# Patient Record
Sex: FEMALE | Race: WHITE | NOT HISPANIC OR LATINO
[De-identification: names, ages, dates, MRNs, and addresses within clinical notes are randomized per-mention and may not be internally consistent; named-entity substitution may affect disease eponyms.]

---

## 2017-08-22 ENCOUNTER — APPOINTMENT (OUTPATIENT)
Dept: FAMILY MEDICINE | Facility: CLINIC | Age: 57
End: 2017-08-22
Payer: COMMERCIAL

## 2017-08-22 VITALS
HEART RATE: 88 BPM | OXYGEN SATURATION: 98 % | RESPIRATION RATE: 12 BRPM | TEMPERATURE: 98.3 F | HEIGHT: 66 IN | DIASTOLIC BLOOD PRESSURE: 74 MMHG | WEIGHT: 210 LBS | SYSTOLIC BLOOD PRESSURE: 122 MMHG | BODY MASS INDEX: 33.75 KG/M2

## 2017-08-22 DIAGNOSIS — R09.81 NASAL CONGESTION: ICD-10-CM

## 2017-08-22 DIAGNOSIS — Z87.09 PERSONAL HISTORY OF OTHER DISEASES OF THE RESPIRATORY SYSTEM: ICD-10-CM

## 2017-08-22 DIAGNOSIS — Z87.39 PERSONAL HISTORY OF OTHER DISEASES OF THE MUSCULOSKELETAL SYSTEM AND CONNECTIVE TISSUE: ICD-10-CM

## 2017-08-22 DIAGNOSIS — Z83.3 FAMILY HISTORY OF DIABETES MELLITUS: ICD-10-CM

## 2017-08-22 PROBLEM — Z00.00 ENCOUNTER FOR PREVENTIVE HEALTH EXAMINATION: Status: ACTIVE | Noted: 2017-08-22

## 2017-08-22 PROCEDURE — 99203 OFFICE O/P NEW LOW 30 MIN: CPT

## 2017-08-22 RX ORDER — MELOXICAM 15 MG/1
15 TABLET ORAL
Qty: 30 | Refills: 0 | Status: DISCONTINUED | COMMUNITY
Start: 2017-06-24 | End: 2017-08-22

## 2017-09-06 ENCOUNTER — RX RENEWAL (OUTPATIENT)
Age: 57
End: 2017-09-06

## 2017-09-06 DIAGNOSIS — J30.2 OTHER SEASONAL ALLERGIC RHINITIS: ICD-10-CM

## 2017-09-22 ENCOUNTER — APPOINTMENT (OUTPATIENT)
Dept: FAMILY MEDICINE | Facility: CLINIC | Age: 57
End: 2017-09-22

## 2018-11-13 ENCOUNTER — APPOINTMENT (OUTPATIENT)
Dept: INFECTIOUS DISEASE | Facility: CLINIC | Age: 58
End: 2018-11-13

## 2018-11-21 ENCOUNTER — APPOINTMENT (OUTPATIENT)
Dept: INTERNAL MEDICINE | Facility: CLINIC | Age: 58
End: 2018-11-21
Payer: MEDICAID

## 2018-11-21 ENCOUNTER — LABORATORY RESULT (OUTPATIENT)
Age: 58
End: 2018-11-21

## 2018-11-21 VITALS — DIASTOLIC BLOOD PRESSURE: 98 MMHG | SYSTOLIC BLOOD PRESSURE: 160 MMHG

## 2018-11-21 VITALS — BODY MASS INDEX: 37.77 KG/M2 | HEIGHT: 66 IN | WEIGHT: 235 LBS

## 2018-11-21 DIAGNOSIS — Z82.3 FAMILY HISTORY OF STROKE: ICD-10-CM

## 2018-11-21 PROCEDURE — 99204 OFFICE O/P NEW MOD 45 MIN: CPT | Mod: 25

## 2018-11-21 PROCEDURE — 36415 COLL VENOUS BLD VENIPUNCTURE: CPT

## 2018-11-27 ENCOUNTER — FORM ENCOUNTER (OUTPATIENT)
Age: 58
End: 2018-11-27

## 2018-11-28 ENCOUNTER — OUTPATIENT (OUTPATIENT)
Dept: OUTPATIENT SERVICES | Facility: HOSPITAL | Age: 58
LOS: 1 days | End: 2018-11-28
Payer: MEDICAID

## 2018-11-28 DIAGNOSIS — M86.172 OTHER ACUTE OSTEOMYELITIS, LEFT ANKLE AND FOOT: ICD-10-CM

## 2018-11-28 PROCEDURE — 36569 INSJ PICC 5 YR+ W/O IMAGING: CPT

## 2018-11-28 PROCEDURE — 76937 US GUIDE VASCULAR ACCESS: CPT | Mod: 26

## 2018-11-28 PROCEDURE — 77001 FLUOROGUIDE FOR VEIN DEVICE: CPT | Mod: 26

## 2018-11-28 RX ORDER — CEFEPIME 1 G/1
2000 INJECTION, POWDER, FOR SOLUTION INTRAMUSCULAR; INTRAVENOUS ONCE
Qty: 0 | Refills: 0 | Status: DISCONTINUED | OUTPATIENT
Start: 2018-11-28 | End: 2018-12-13

## 2018-11-28 RX ORDER — CEFEPIME 1 G/1
2000 INJECTION, POWDER, FOR SOLUTION INTRAMUSCULAR; INTRAVENOUS EVERY 8 HOURS
Qty: 0 | Refills: 0 | Status: DISCONTINUED | OUTPATIENT
Start: 2018-11-28 | End: 2018-12-13

## 2018-11-28 RX ORDER — CEFEPIME 1 G/1
INJECTION, POWDER, FOR SOLUTION INTRAMUSCULAR; INTRAVENOUS
Qty: 0 | Refills: 0 | Status: DISCONTINUED | OUTPATIENT
Start: 2018-11-28 | End: 2018-12-13

## 2018-11-29 ENCOUNTER — INPATIENT (INPATIENT)
Facility: HOSPITAL | Age: 58
LOS: 3 days | Discharge: LEFT BEFORE DISCHARGE | DRG: 539 | End: 2018-12-03
Attending: HOSPITALIST | Admitting: HOSPITALIST
Payer: MEDICAID

## 2018-11-29 VITALS — HEIGHT: 65 IN | WEIGHT: 240.08 LBS

## 2018-11-29 LAB
ALBUMIN SERPL ELPH-MCNC: 3.7 G/DL — SIGNIFICANT CHANGE UP (ref 3.3–5.2)
ALBUMIN SERPL ELPH-MCNC: 4.1 G/DL
ALP BLD-CCNC: 96 U/L
ALP SERPL-CCNC: 70 U/L — SIGNIFICANT CHANGE UP (ref 40–120)
ALT FLD-CCNC: 7 U/L — SIGNIFICANT CHANGE UP
ALT SERPL-CCNC: 7 U/L
ANION GAP SERPL CALC-SCNC: 14 MMOL/L
ANION GAP SERPL CALC-SCNC: 16 MMOL/L — SIGNIFICANT CHANGE UP (ref 5–17)
AST SERPL-CCNC: 11 U/L
AST SERPL-CCNC: 11 U/L — SIGNIFICANT CHANGE UP
BASOPHILS # BLD AUTO: 0.03 K/UL
BASOPHILS NFR BLD AUTO: 0.4 %
BILIRUB SERPL-MCNC: 0.2 MG/DL
BILIRUB SERPL-MCNC: 0.6 MG/DL — SIGNIFICANT CHANGE UP (ref 0.4–2)
BUN SERPL-MCNC: 12 MG/DL
BUN SERPL-MCNC: 12 MG/DL — SIGNIFICANT CHANGE UP (ref 8–20)
CALCIUM SERPL-MCNC: 9.1 MG/DL — SIGNIFICANT CHANGE UP (ref 8.6–10.2)
CALCIUM SERPL-MCNC: 9.2 MG/DL
CHLORIDE SERPL-SCNC: 101 MMOL/L
CHLORIDE SERPL-SCNC: 99 MMOL/L — SIGNIFICANT CHANGE UP (ref 98–107)
CO2 SERPL-SCNC: 19 MMOL/L — LOW (ref 22–29)
CO2 SERPL-SCNC: 25 MMOL/L
CREAT SERPL-MCNC: 0.84 MG/DL
CREAT SERPL-MCNC: 0.94 MG/DL — SIGNIFICANT CHANGE UP (ref 0.5–1.3)
CRP SERPL-MCNC: 0.54 MG/DL
EOSINOPHIL # BLD AUTO: 0.45 K/UL
EOSINOPHIL NFR BLD AUTO: 5 % — SIGNIFICANT CHANGE UP (ref 0–5)
EOSINOPHIL NFR BLD AUTO: 5.8 %
ERYTHROCYTE [SEDIMENTATION RATE] IN BLOOD BY WESTERGREN METHOD: 29 MM/HR
GAS PNL BLDV: SIGNIFICANT CHANGE UP
GLUCOSE SERPL-MCNC: 142 MG/DL — HIGH (ref 70–115)
GLUCOSE SERPL-MCNC: 87 MG/DL
HCT VFR BLD CALC: 41.7 % — SIGNIFICANT CHANGE UP (ref 37–47)
HCT VFR BLD CALC: 42.1 %
HGB BLD-MCNC: 14.1 G/DL — SIGNIFICANT CHANGE UP (ref 12–16)
HGB BLD-MCNC: 14.5 G/DL
IMM GRANULOCYTES NFR BLD AUTO: 0.3 %
LYMPHOCYTES # BLD AUTO: 2.39 K/UL
LYMPHOCYTES # BLD AUTO: 3 % — LOW (ref 20–55)
LYMPHOCYTES NFR BLD AUTO: 30.6 %
MAN DIFF?: NORMAL
MCHC RBC-ENTMCNC: 29.5 PG — SIGNIFICANT CHANGE UP (ref 27–31)
MCHC RBC-ENTMCNC: 30.7 PG
MCHC RBC-ENTMCNC: 33.8 G/DL — SIGNIFICANT CHANGE UP (ref 32–36)
MCHC RBC-ENTMCNC: 34.4 GM/DL
MCV RBC AUTO: 87.2 FL — SIGNIFICANT CHANGE UP (ref 81–99)
MCV RBC AUTO: 89 FL
MONOCYTES # BLD AUTO: 0.58 K/UL
MONOCYTES NFR BLD AUTO: 7.4 %
NEUTROPHILS # BLD AUTO: 4.33 K/UL
NEUTROPHILS NFR BLD AUTO: 55.5 %
NEUTROPHILS NFR BLD AUTO: 90 % — HIGH (ref 37–73)
PLATELET # BLD AUTO: 231 K/UL — SIGNIFICANT CHANGE UP (ref 150–400)
PLATELET # BLD AUTO: 255 K/UL
POTASSIUM SERPL-MCNC: 4.4 MMOL/L — SIGNIFICANT CHANGE UP (ref 3.5–5.3)
POTASSIUM SERPL-SCNC: 4.2 MMOL/L
POTASSIUM SERPL-SCNC: 4.4 MMOL/L — SIGNIFICANT CHANGE UP (ref 3.5–5.3)
PROT SERPL-MCNC: 6.5 G/DL
PROT SERPL-MCNC: 6.6 G/DL — SIGNIFICANT CHANGE UP (ref 6.6–8.7)
RBC # BLD: 4.73 M/UL
RBC # BLD: 4.78 M/UL — SIGNIFICANT CHANGE UP (ref 4.4–5.2)
RBC # FLD: 13.2 % — SIGNIFICANT CHANGE UP (ref 11–15.6)
RBC # FLD: 13.3 %
SODIUM SERPL-SCNC: 134 MMOL/L — LOW (ref 135–145)
SODIUM SERPL-SCNC: 140 MMOL/L
WBC # BLD: 10.6 K/UL — SIGNIFICANT CHANGE UP (ref 4.8–10.8)
WBC # FLD AUTO: 10.6 K/UL — SIGNIFICANT CHANGE UP (ref 4.8–10.8)
WBC # FLD AUTO: 7.8 K/UL

## 2018-11-29 PROCEDURE — 93010 ELECTROCARDIOGRAM REPORT: CPT

## 2018-11-29 PROCEDURE — 99223 1ST HOSP IP/OBS HIGH 75: CPT

## 2018-11-29 PROCEDURE — 99285 EMERGENCY DEPT VISIT HI MDM: CPT

## 2018-11-29 PROCEDURE — 73610 X-RAY EXAM OF ANKLE: CPT | Mod: 26,LT

## 2018-11-29 RX ORDER — SODIUM CHLORIDE 9 MG/ML
1000 INJECTION INTRAMUSCULAR; INTRAVENOUS; SUBCUTANEOUS
Qty: 0 | Refills: 0 | Status: COMPLETED | OUTPATIENT
Start: 2018-11-29 | End: 2018-11-30

## 2018-11-29 RX ORDER — VANCOMYCIN HCL 1 G
1250 VIAL (EA) INTRAVENOUS ONCE
Qty: 0 | Refills: 0 | Status: COMPLETED | OUTPATIENT
Start: 2018-11-29 | End: 2018-11-29

## 2018-11-29 RX ORDER — SODIUM CHLORIDE 9 MG/ML
1800 INJECTION INTRAMUSCULAR; INTRAVENOUS; SUBCUTANEOUS ONCE
Qty: 0 | Refills: 0 | Status: COMPLETED | OUTPATIENT
Start: 2018-11-29 | End: 2018-11-29

## 2018-11-29 RX ORDER — ACETAMINOPHEN 500 MG
650 TABLET ORAL ONCE
Qty: 0 | Refills: 0 | Status: COMPLETED | OUTPATIENT
Start: 2018-11-29 | End: 2018-11-29

## 2018-11-29 RX ORDER — FAMOTIDINE 10 MG/ML
20 INJECTION INTRAVENOUS ONCE
Qty: 0 | Refills: 0 | Status: COMPLETED | OUTPATIENT
Start: 2018-11-29 | End: 2018-11-29

## 2018-11-29 RX ORDER — CLOPIDOGREL BISULFATE 75 MG/1
75 TABLET, FILM COATED ORAL DAILY
Qty: 0 | Refills: 0 | Status: DISCONTINUED | OUTPATIENT
Start: 2018-11-29 | End: 2018-12-03

## 2018-11-29 RX ORDER — ATORVASTATIN CALCIUM 80 MG/1
20 TABLET, FILM COATED ORAL AT BEDTIME
Qty: 0 | Refills: 0 | Status: DISCONTINUED | OUTPATIENT
Start: 2018-11-29 | End: 2018-12-03

## 2018-11-29 RX ORDER — DIPHENHYDRAMINE HCL 50 MG
50 CAPSULE ORAL ONCE
Qty: 0 | Refills: 0 | Status: DISCONTINUED | OUTPATIENT
Start: 2018-11-29 | End: 2018-11-29

## 2018-11-29 RX ORDER — DIPHENHYDRAMINE HCL 50 MG
50 CAPSULE ORAL ONCE
Qty: 0 | Refills: 0 | Status: COMPLETED | OUTPATIENT
Start: 2018-11-29 | End: 2018-11-29

## 2018-11-29 RX ORDER — ONDANSETRON 8 MG/1
4 TABLET, FILM COATED ORAL ONCE
Qty: 0 | Refills: 0 | Status: COMPLETED | OUTPATIENT
Start: 2018-11-29 | End: 2018-11-29

## 2018-11-29 RX ADMIN — Medication 650 MILLIGRAM(S): at 20:39

## 2018-11-29 RX ADMIN — FAMOTIDINE 20 MILLIGRAM(S): 10 INJECTION INTRAVENOUS at 18:42

## 2018-11-29 RX ADMIN — SODIUM CHLORIDE 1800 MILLILITER(S): 9 INJECTION INTRAMUSCULAR; INTRAVENOUS; SUBCUTANEOUS at 21:13

## 2018-11-29 RX ADMIN — Medication 125 MILLIGRAM(S): at 18:42

## 2018-11-29 RX ADMIN — Medication 650 MILLIGRAM(S): at 18:41

## 2018-11-29 RX ADMIN — SODIUM CHLORIDE 1800 MILLILITER(S): 9 INJECTION INTRAMUSCULAR; INTRAVENOUS; SUBCUTANEOUS at 18:42

## 2018-11-29 RX ADMIN — Medication 50 MILLIGRAM(S): at 18:41

## 2018-11-29 NOTE — H&P ADULT - PROBLEM SELECTOR PLAN 2
ulcer as source of fever ?  viral ? cxr no obvious infiltrate, pt. has no cough, phlegm. will send flu panel. follow blood , urine cx.  will give one dose of vanco and one dose of levaquin and further antibiotics as per ID. ID consult called. ulcer as source of fever ?  viral ? bacteremia ? cxr no obvious infiltrate, pt. has no cough, phlegm. will send flu panel. follow blood , urine cx.  will give one dose of vanco and one dose of levaquin and further antibiotics as per ID. ID consult called.

## 2018-11-29 NOTE — ED PROVIDER NOTE - ATTENDING CONTRIBUTION TO CARE
[t with drug fever after receiving IV abx    temp 101    sepsis protocol   h/o left ankle infection   to be admitted

## 2018-11-29 NOTE — H&P ADULT - PROBLEM SELECTOR PLAN 1
ulcer , as per history chronic , arterial ulcer ? will request day team to consult vascular surgery team.

## 2018-11-29 NOTE — ED ADULT NURSE NOTE - OBJECTIVE STATEMENT
presents from home sent by visiting nurse for r/o sepsis. present with generalized rash and erythema possibly r/t recent infusion of cefepime via PICC line in right arm placed at [facility] yesterday for left calf wound treatement. denies any n/v/d cp jose. md at bedside will ctm/

## 2018-11-29 NOTE — H&P ADULT - HISTORY OF PRESENT ILLNESS
59 y/o female who got picc line on 11/28/18 and started her cefepime at home recommended by CHANTEL zafar for her LLE wound which is present for a year as per pt. Pt. also stated that she goes [place]. As per pt. after her 2nd dose about 1 hour later she developed generalized rash over her body and face and also developed fever. pt. was BIBA. tmax in .9. pt. was tachy 114. pt. got iv fluids, tylenol, solumedrol, benadryl, pepcid and felt better. At the time of admission pt. is afebrile, HR 70's. pt. reports no breathing difficulty. no cp. no abd. pain. no n/v/d. 57 y/o female who got picc line on 11/28/18 and started her cefepime at home recommended by CHANTEL zafar for her LLE wound which is present for a year as per pt. Pt. also stated that she goes to [place] care [place]. As per pt. after her 2nd dose about 1 hour later she developed generalized rash over her body and face and also developed fever. pt. was BIBA. tmax in .9. pt. was tachy 114. pt. got iv fluids, tylenol, solumedrol, benadryl, pepcid and felt better. At the time of admission pt. is afebrile, HR 70's. pt. reports no breathing difficulty. no cp. no abd. pain. no n/v/d.  pt. reports h/o hyperlipidemia, pvd and got stent in her LLE, has seen dr. Antonio at [facility].

## 2018-11-29 NOTE — ED PROVIDER NOTE - PROGRESS NOTE DETAILS
pt comes in for fever and generalized redness after receiving  Iv abx   h/o left ankle wound being treated at home

## 2018-11-29 NOTE — H&P ADULT - PROBLEM SELECTOR PLAN 3
pt' skin rash might be related to cefpime ? hold cefepime. ID consult for further antibiotics. solumedrol, benadryl and pepcid for now.

## 2018-11-29 NOTE — ED PROVIDER NOTE - MEDICAL DECISION MAKING DETAILS
59yo F pmhx HLD, infection of left ankle wound being treated at home via PICC line, sent in with concern for drug reaction/allergy after visiting nurse noticed diffuse skin rash. Will treat allergic reaction with benadryl, steroids, pepcid. Will also give fluids and tylenol, send labs and xray in setting of fever and active infection.

## 2018-11-29 NOTE — ED ADULT NURSE NOTE - NSIMPLEMENTINTERV_GEN_ALL_ED
Implemented All Universal Safety Interventions:  [place] to call system. Call bell, personal items and telephone within reach. Instruct patient to call for assistance. Room bathroom lighting operational. Non-slip footwear when patient is off stretcher. Physically safe environment: no spills, clutter or unnecessary equipment. Stretcher in lowest position, wheels locked, appropriate side rails in place.

## 2018-11-29 NOTE — H&P ADULT - NSHPLABSRESULTS_GEN_ALL_CORE
cxr reviewed by me, no obvious infiltrate, basal atelectasis ? no pl. effusion, radiologist reading pending.

## 2018-11-29 NOTE — ED ADULT NURSE REASSESSMENT NOTE - NS ED NURSE REASSESS COMMENT FT1
appears improved redness endorsing relief of itching and burning in body wound measured over left shin [id number].

## 2018-11-29 NOTE — H&P ADULT - NSHPPHYSICALEXAM_GEN_ALL_CORE
General: Well developed lying in bed not in distress.   HEENT: AT, NC. PERRL. intact EOM. no throat erythema or exudate.   Neck: supple. no JVD.   Chest: CTA bilaterally  Heart: normal S1,S2. RRR. no heart murmur.  Abdomen: soft. non-tender. non-distended. + BS.   Ext: no C/C/E. no calf tenderness.   above left ankle area and lower lateral part of left lower ext. about  4 x 4 cm somewhat oval shaped ulcer with clean edges with erythematous base, stage 3 without foul smelling discharge noted .   Neuro: AAO x3. no focal weakness. no speech disorder  Skin: fine erythematous rash noted over face, upper chest, abdomen and back and legs. As per staff rash much less than before. General: Well developed lying in bed not in distress.   HEENT: AT, NC. PERRL. intact EOM. no throat erythema or exudate.   Neck: supple. no JVD.   Chest: CTA bilaterally  Heart: normal S1,S2. RRR. no heart murmur. no edema.  Abdomen: soft. non-tender. non-distended. + BS.   Ext: no cyanosis.  no calf tenderness.   above left ankle area and lower lateral part of left lower ext. about  4 x 4 cm somewhat oval shaped ulcer with clean edges with erythematous base, stage 3 without foul smelling discharge noted .   Neuro: AAO x3. no focal weakness. no speech disorder. CN II to XII intact.   Vascular : distal pulses weakly palpable over lower ext.   Skin: fine erythematous rash noted over face, upper chest, abdomen and back and legs. As per staff rash much less than before. RUE picc line appears intact, no surrounding erythema or discharge noted.

## 2018-11-29 NOTE — ED ADULT NURSE NOTE - CHIEF COMPLAINT QUOTE
Patient BIBA, hx of osteomyelitis, as per EMS visiting nurse stated patient has had fevers and "new infection" to left ankle, sent to critical care for code sepsis

## 2018-11-29 NOTE — ED PROVIDER NOTE - CARDIAC RATE
OLU/ [facility])        • Vianey Marcos MD;  Location: [facility] [facility]

## 2018-11-29 NOTE — ED PROVIDER NOTE - OBJECTIVE STATEMENT
57yo F pmhx HLD, PVD, chronic osteo/ulceration of LLE overlying the ankle, recent PICC line placement for cefepime regiment BIBEMS for fever/drug reaction. Pt. explains she has been receiving cefepime since yesterday for her ankle infection, today at 9AM she noticed skin flushing/erythema 3 hours after cefepime dose, visiting nurse also reported fever and weakness. Pt. denies CP SOB n/v/d urinary symptoms.

## 2018-11-30 DIAGNOSIS — R65.10 SYSTEMIC INFLAMMATORY RESPONSE SYNDROME (SIRS) OF NON-INFECTIOUS ORIGIN WITHOUT ACUTE ORGAN DYSFUNCTION: ICD-10-CM

## 2018-11-30 DIAGNOSIS — R50.9 FEVER, UNSPECIFIED: ICD-10-CM

## 2018-11-30 DIAGNOSIS — T78.40XA ALLERGY, UNSPECIFIED, INITIAL ENCOUNTER: ICD-10-CM

## 2018-11-30 DIAGNOSIS — L97.922 NON-PRESSURE CHRONIC ULCER OF UNSPECIFIED PART OF LEFT LOWER LEG WITH FAT LAYER EXPOSED: ICD-10-CM

## 2018-11-30 DIAGNOSIS — E11.9 TYPE 2 DIABETES MELLITUS WITHOUT COMPLICATIONS: ICD-10-CM

## 2018-11-30 LAB
ANION GAP SERPL CALC-SCNC: 9 MMOL/L — SIGNIFICANT CHANGE UP (ref 5–17)
APPEARANCE UR: CLEAR — SIGNIFICANT CHANGE UP
APTT BLD: 28.7 SEC — SIGNIFICANT CHANGE UP (ref 27.5–36.3)
BILIRUB UR-MCNC: NEGATIVE — SIGNIFICANT CHANGE UP
BUN SERPL-MCNC: 12 MG/DL — SIGNIFICANT CHANGE UP (ref 8–20)
BURR CELLS BLD QL SMEAR: PRESENT — SIGNIFICANT CHANGE UP
CALCIUM SERPL-MCNC: 9 MG/DL — SIGNIFICANT CHANGE UP (ref 8.6–10.2)
CHLORIDE SERPL-SCNC: 106 MMOL/L — SIGNIFICANT CHANGE UP (ref 98–107)
CO2 SERPL-SCNC: 24 MMOL/L — SIGNIFICANT CHANGE UP (ref 22–29)
COLOR SPEC: YELLOW — SIGNIFICANT CHANGE UP
CREAT SERPL-MCNC: 0.78 MG/DL — SIGNIFICANT CHANGE UP (ref 0.5–1.3)
DIFF PNL FLD: NEGATIVE — SIGNIFICANT CHANGE UP
GLUCOSE SERPL-MCNC: 185 MG/DL — HIGH (ref 70–115)
GLUCOSE UR QL: NEGATIVE MG/DL — SIGNIFICANT CHANGE UP
HCT VFR BLD CALC: 41.4 % — SIGNIFICANT CHANGE UP (ref 37–47)
HGB BLD-MCNC: 13.6 G/DL — SIGNIFICANT CHANGE UP (ref 12–16)
INR BLD: 1.18 RATIO — HIGH (ref 0.88–1.16)
KETONES UR-MCNC: NEGATIVE — SIGNIFICANT CHANGE UP
LEUKOCYTE ESTERASE UR-ACNC: NEGATIVE — SIGNIFICANT CHANGE UP
LYMPHOCYTES # BLD AUTO: 3 % — LOW (ref 20–55)
MCHC RBC-ENTMCNC: 29.2 PG — SIGNIFICANT CHANGE UP (ref 27–31)
MCHC RBC-ENTMCNC: 32.9 G/DL — SIGNIFICANT CHANGE UP (ref 32–36)
MCV RBC AUTO: 89 FL — SIGNIFICANT CHANGE UP (ref 81–99)
MONOCYTES NFR BLD AUTO: 1 % — LOW (ref 3–10)
NEUTROPHILS NFR BLD AUTO: 96 % — HIGH (ref 37–73)
NITRITE UR-MCNC: NEGATIVE — SIGNIFICANT CHANGE UP
PH UR: 6 — SIGNIFICANT CHANGE UP (ref 5–8)
PLAT MORPH BLD: NORMAL — SIGNIFICANT CHANGE UP
PLATELET # BLD AUTO: 200 K/UL — SIGNIFICANT CHANGE UP (ref 150–400)
POTASSIUM SERPL-MCNC: 4.6 MMOL/L — SIGNIFICANT CHANGE UP (ref 3.5–5.3)
POTASSIUM SERPL-SCNC: 4.6 MMOL/L — SIGNIFICANT CHANGE UP (ref 3.5–5.3)
PROT UR-MCNC: NEGATIVE MG/DL — SIGNIFICANT CHANGE UP
PROTHROM AB SERPL-ACNC: 13.6 SEC — HIGH (ref 10–12.9)
RAPID RVP RESULT: SIGNIFICANT CHANGE UP
RBC # BLD: 4.65 M/UL — SIGNIFICANT CHANGE UP (ref 4.4–5.2)
RBC # FLD: 13.4 % — SIGNIFICANT CHANGE UP (ref 11–15.6)
RBC BLD AUTO: ABNORMAL
SODIUM SERPL-SCNC: 139 MMOL/L — SIGNIFICANT CHANGE UP (ref 135–145)
SP GR SPEC: 1.01 — SIGNIFICANT CHANGE UP (ref 1.01–1.02)
UROBILINOGEN FLD QL: NEGATIVE MG/DL — SIGNIFICANT CHANGE UP
WBC # BLD: 4.8 K/UL — SIGNIFICANT CHANGE UP (ref 4.8–10.8)
WBC # FLD AUTO: 4.8 K/UL — SIGNIFICANT CHANGE UP (ref 4.8–10.8)

## 2018-11-30 PROCEDURE — 99232 SBSQ HOSP IP/OBS MODERATE 35: CPT

## 2018-11-30 PROCEDURE — 71046 X-RAY EXAM CHEST 2 VIEWS: CPT | Mod: 26

## 2018-11-30 PROCEDURE — 99222 1ST HOSP IP/OBS MODERATE 55: CPT

## 2018-11-30 RX ORDER — MEROPENEM 1 G/30ML
1000 INJECTION INTRAVENOUS EVERY 8 HOURS
Qty: 0 | Refills: 0 | Status: DISCONTINUED | OUTPATIENT
Start: 2018-11-30 | End: 2018-12-03

## 2018-11-30 RX ORDER — DIPHENHYDRAMINE HCL 50 MG
25 CAPSULE ORAL EVERY 6 HOURS
Qty: 0 | Refills: 0 | Status: DISCONTINUED | OUTPATIENT
Start: 2018-11-30 | End: 2018-12-03

## 2018-11-30 RX ORDER — MEROPENEM 1 G/30ML
INJECTION INTRAVENOUS
Qty: 0 | Refills: 0 | Status: DISCONTINUED | OUTPATIENT
Start: 2018-11-30 | End: 2018-12-03

## 2018-11-30 RX ORDER — BENZOCAINE AND MENTHOL 5; 1 G/100ML; G/100ML
1 LIQUID ORAL EVERY 4 HOURS
Qty: 0 | Refills: 0 | Status: DISCONTINUED | OUTPATIENT
Start: 2018-11-30 | End: 2018-12-03

## 2018-11-30 RX ORDER — ENOXAPARIN SODIUM 100 MG/ML
40 INJECTION SUBCUTANEOUS DAILY
Qty: 0 | Refills: 0 | Status: DISCONTINUED | OUTPATIENT
Start: 2018-11-30 | End: 2018-12-03

## 2018-11-30 RX ORDER — COLLAGENASE CLOSTRIDIUM HIST. 250 UNIT/G
1 OINTMENT (GRAM) TOPICAL DAILY
Qty: 0 | Refills: 0 | Status: DISCONTINUED | OUTPATIENT
Start: 2018-11-30 | End: 2018-12-03

## 2018-11-30 RX ORDER — CHLORHEXIDINE GLUCONATE 213 G/1000ML
1 SOLUTION TOPICAL DAILY
Qty: 0 | Refills: 0 | Status: DISCONTINUED | OUTPATIENT
Start: 2018-11-30 | End: 2018-12-03

## 2018-11-30 RX ORDER — DIPHENHYDRAMINE HCL 50 MG
25 CAPSULE ORAL EVERY 6 HOURS
Qty: 0 | Refills: 0 | Status: COMPLETED | OUTPATIENT
Start: 2018-11-30 | End: 2018-11-30

## 2018-11-30 RX ORDER — FAMOTIDINE 10 MG/ML
20 INJECTION INTRAVENOUS DAILY
Qty: 0 | Refills: 0 | Status: COMPLETED | OUTPATIENT
Start: 2018-11-30 | End: 2018-12-01

## 2018-11-30 RX ORDER — ACETAMINOPHEN 500 MG
650 TABLET ORAL EVERY 6 HOURS
Qty: 0 | Refills: 0 | Status: DISCONTINUED | OUTPATIENT
Start: 2018-11-30 | End: 2018-12-03

## 2018-11-30 RX ORDER — ONDANSETRON 8 MG/1
4 TABLET, FILM COATED ORAL EVERY 6 HOURS
Qty: 0 | Refills: 0 | Status: DISCONTINUED | OUTPATIENT
Start: 2018-11-30 | End: 2018-12-03

## 2018-11-30 RX ORDER — SODIUM CHLORIDE 9 MG/ML
1000 INJECTION INTRAMUSCULAR; INTRAVENOUS; SUBCUTANEOUS
Qty: 0 | Refills: 0 | Status: DISCONTINUED | OUTPATIENT
Start: 2018-11-30 | End: 2018-12-03

## 2018-11-30 RX ORDER — OXYCODONE HYDROCHLORIDE 5 MG/1
5 TABLET ORAL ONCE
Qty: 0 | Refills: 0 | Status: DISCONTINUED | OUTPATIENT
Start: 2018-11-30 | End: 2018-11-30

## 2018-11-30 RX ORDER — MEROPENEM 1 G/30ML
1000 INJECTION INTRAVENOUS ONCE
Qty: 0 | Refills: 0 | Status: COMPLETED | OUTPATIENT
Start: 2018-11-30 | End: 2018-11-30

## 2018-11-30 RX ADMIN — CLOPIDOGREL BISULFATE 75 MILLIGRAM(S): 75 TABLET, FILM COATED ORAL at 12:44

## 2018-11-30 RX ADMIN — Medication 25 MILLIGRAM(S): at 05:06

## 2018-11-30 RX ADMIN — Medication 40 MILLIGRAM(S): at 20:57

## 2018-11-30 RX ADMIN — Medication 25 MILLIGRAM(S): at 17:24

## 2018-11-30 RX ADMIN — Medication 40 MILLIGRAM(S): at 12:44

## 2018-11-30 RX ADMIN — Medication 25 MILLIGRAM(S): at 12:44

## 2018-11-30 RX ADMIN — MEROPENEM 100 MILLIGRAM(S): 1 INJECTION INTRAVENOUS at 15:39

## 2018-11-30 RX ADMIN — ONDANSETRON 4 MILLIGRAM(S): 8 TABLET, FILM COATED ORAL at 06:00

## 2018-11-30 RX ADMIN — Medication 650 MILLIGRAM(S): at 12:43

## 2018-11-30 RX ADMIN — Medication 40 MILLIGRAM(S): at 01:33

## 2018-11-30 RX ADMIN — ATORVASTATIN CALCIUM 20 MILLIGRAM(S): 80 TABLET, FILM COATED ORAL at 20:59

## 2018-11-30 RX ADMIN — SODIUM CHLORIDE 100 MILLILITER(S): 9 INJECTION INTRAMUSCULAR; INTRAVENOUS; SUBCUTANEOUS at 12:50

## 2018-11-30 RX ADMIN — Medication 650 MILLIGRAM(S): at 13:49

## 2018-11-30 RX ADMIN — SODIUM CHLORIDE 150 MILLILITER(S): 9 INJECTION INTRAMUSCULAR; INTRAVENOUS; SUBCUTANEOUS at 01:15

## 2018-11-30 RX ADMIN — SODIUM CHLORIDE 100 MILLILITER(S): 9 INJECTION INTRAMUSCULAR; INTRAVENOUS; SUBCUTANEOUS at 20:59

## 2018-11-30 RX ADMIN — Medication 1 APPLICATION(S): at 12:45

## 2018-11-30 RX ADMIN — MEROPENEM 100 MILLIGRAM(S): 1 INJECTION INTRAVENOUS at 20:56

## 2018-11-30 RX ADMIN — FAMOTIDINE 20 MILLIGRAM(S): 10 INJECTION INTRAVENOUS at 12:44

## 2018-11-30 RX ADMIN — OXYCODONE HYDROCHLORIDE 5 MILLIGRAM(S): 5 TABLET ORAL at 18:06

## 2018-11-30 RX ADMIN — OXYCODONE HYDROCHLORIDE 5 MILLIGRAM(S): 5 TABLET ORAL at 18:30

## 2018-11-30 RX ADMIN — Medication 250 MILLIGRAM(S): at 01:35

## 2018-11-30 NOTE — CONSULT NOTE ADULT - ASSESSMENT
This 58 y.o. F with PAD, HTN, here for fevers, had recently been started on Cefepime for left leg non healing ulcer and osteomyelitis.     - unclear if this is a drug reaction.   - RLL infiltrate on CXR may represent pneumonia    - defer any more doses of cefepime    - Start Meropenem 1 gram IV Q8H first dose stat; will treat pseudomonas and possible PNA  - will monitor for reaction  - PRN solumedrol  - PRN benadryl    - follow up all outstanding cultures  - trend temperature and WBC curve  - repeat cultures from blood and all sources if febrile.

## 2018-11-30 NOTE — PROGRESS NOTE ADULT - SUBJECTIVE AND OBJECTIVE BOX
KARLA TOTH  ----------------------------------------  The patient was seen and evaluated for rash.  The patient is in no acute distress.  Denied any chest pain, palpitations, dyspnea, or abdominal pain.  Reports persistent rash.    Vital Signs Last 24 Hrs  T(C): 37.2 (2018 09:13), Max: 38.8 (2018 17:30)  T(F): 99 (2018 09:13), Max: 101.9 (2018 17:30)  HR: 132 (2018 08:55) (74 - 132)  BP: 158/87 (2018 08:55) (107/56 - 176/85)  BP(mean): --  RR: 18 (2018 08:55) (17 - 18)  SpO2: 93% (2018 08:55) (93% - 97%)    PHYSICAL EXAMINATION:  ----------------------------------------  General appearance: No acute distress, Awake, Alert  HEENT: Normocephalic, Atraumatic, Conjunctiva clear, EOMI  Neck: Supple, No JVD, No tenderness  Lungs: Clear to auscultation, Breath sound equal bilaterally, No wheezes, No rales  Cardiovascular: S1S2, Regular rhythm  Abdomen: Soft, Nontender, Nondistended, No guarding/rebound, Positive bowel sounds  Extremities: No clubbing, No cyanosis, No edema, No calf tenderness, Left leg dressing clean and intact  Neuro: Strength equal bilaterally, No tremors  Psychiatric: Appropriate mood, Normal affect  Derm: Erythematous rash    LABORATORY STUDIES:  ----------------------------------------             13.6   4.8   )-----------( 200      ( 2018 04:40 )             41.4     11-30    139  |  106  |  12.0  ----------------------------<  185<H>  4.6   |  24.0  |  0.78    Ca    9.0      2018 04:40    TPro  6.6  /  Alb  3.7  /  TBili  0.6  /  DBili  x   /  AST  11  /  ALT  7   /  AlkPhos  70  11-29    LIVER FUNCTIONS - ( 2018 17:54 )  Alb: 3.7 g/dL / Pro: 6.6 g/dL / ALK PHOS: 70 U/L / ALT: 7 U/L / AST: 11 U/L / GGT: x           PT/INR - ( 2018 04:40 )   PT: 13.6 sec;   INR: 1.18 ratio    PTT - ( 2018 04:40 )  PTT:28.7 sec    Urinalysis Basic - ( 2018 02:36 )  Color: Yellow / Appearance: Clear / S.010 / pH: x  Gluc: x / Ketone: Negative  / Bili: Negative / Urobili: Negative mg/dL   Blood: x / Protein: Negative mg/dL / Nitrite: Negative   Leuk Esterase: Negative / RBC: x / WBC x   Sq Epi: x / Non Sq Epi: x / Bacteria: x    MEDICATIONS  (STANDING):  atorvastatin 20 milliGRAM(s) Oral at bedtime  clopidogrel Tablet 75 milliGRAM(s) Oral daily  collagenase Ointment 1 Application(s) Topical daily  diphenhydrAMINE 25 milliGRAM(s) Oral every 6 hours  enoxaparin Injectable 40 milliGRAM(s) SubCutaneous daily  famotidine    Tablet 20 milliGRAM(s) Oral daily  methylPREDNISolone sodium succinate Injectable 40 milliGRAM(s) IV Push every 8 hours    MEDICATIONS  (PRN):  benzocaine 15 mG/menthol 3.6 mG Lozenge 1 Lozenge Oral every 4 hours PRN Sore Throat  ondansetron Injectable 4 milliGRAM(s) IV Push every 6 hours PRN Nausea and/or Vomiting      ASSESSMENT / PLAN:  ----------------------------------------  Rash / Drug reaction - Infectious Disease consultation pending. On methylprednisolone and diphenhydramine. No respiratory compromise. Outpatient notes report that the patient was being treated for osteomyelitis and had recent stent placement for peripheral artery disease.    SIRS - Monitoring fever trend. Blood culture results to be followed.    Leg ulcer - On collagenase as per home wound care regimen. KARLA TOTH  ----------------------------------------  The patient was seen and evaluated for rash.  The patient is in no acute distress.  Denied any chest pain, palpitations, dyspnea, or abdominal pain.  Reports persistent rash.    HPI:  58F presented with a generalized rash and fever while on home intravenous antibiotics. The patient was noted to have a chronic left lower extremity wound for the past year. On presentation, Temp(101.9), WBC(10.6). Methylprednisolone and diphenhydramine were initiated for possible drug reaction.     Vital Signs Last 24 Hrs  T(C): 37.2 (2018 09:13), Max: 38.8 (2018 17:30)  T(F): 99 (2018 09:13), Max: 101.9 (2018 17:30)  HR: 132 (2018 08:55) (74 - 132)  BP: 158/87 (2018 08:55) (107/56 - 176/85)  BP(mean): --  RR: 18 (2018 08:55) (17 - 18)  SpO2: 93% (2018 08:55) (93% - 97%)    PHYSICAL EXAMINATION:  ----------------------------------------  General appearance: No acute distress, Awake, Alert  HEENT: Normocephalic, Atraumatic, Conjunctiva clear, EOMI  Neck: Supple, No JVD, No tenderness  Lungs: Clear to auscultation, Breath sound equal bilaterally, No wheezes, No rales  Cardiovascular: S1S2, Regular rhythm  Abdomen: Soft, Nontender, Nondistended, No guarding/rebound, Positive bowel sounds  Extremities: No clubbing, No cyanosis, No edema, No calf tenderness, Left leg dressing clean and intact  Neuro: Strength equal bilaterally, No tremors  Psychiatric: Appropriate mood, Normal affect  Derm: Erythematous rash    LABORATORY STUDIES:  ----------------------------------------             13.6   4.8   )-----------( 200      ( 2018 04:40 )             41.4         139  |  106  |  12.0  ----------------------------<  185<H>  4.6   |  24.0  |  0.78    Ca    9.0      2018 04:40    TPro  6.6  /  Alb  3.7  /  TBili  0.6  /  DBili  x   /  AST  11  /  ALT  7   /  AlkPhos  70      LIVER FUNCTIONS - ( 2018 17:54 )  Alb: 3.7 g/dL / Pro: 6.6 g/dL / ALK PHOS: 70 U/L / ALT: 7 U/L / AST: 11 U/L / GGT: x           PT/INR - ( 2018 04:40 )   PT: 13.6 sec;   INR: 1.18 ratio    PTT - ( 2018 04:40 )  PTT:28.7 sec    Urinalysis Basic - ( 2018 02:36 )  Color: Yellow / Appearance: Clear / S.010 / pH: x  Gluc: x / Ketone: Negative  / Bili: Negative / Urobili: Negative mg/dL   Blood: x / Protein: Negative mg/dL / Nitrite: Negative   Leuk Esterase: Negative / RBC: x / WBC x   Sq Epi: x / Non Sq Epi: x / Bacteria: x    MEDICATIONS  (STANDING):  atorvastatin 20 milliGRAM(s) Oral at bedtime  clopidogrel Tablet 75 milliGRAM(s) Oral daily  collagenase Ointment 1 Application(s) Topical daily  diphenhydrAMINE 25 milliGRAM(s) Oral every 6 hours  enoxaparin Injectable 40 milliGRAM(s) SubCutaneous daily  famotidine    Tablet 20 milliGRAM(s) Oral daily  methylPREDNISolone sodium succinate Injectable 40 milliGRAM(s) IV Push every 8 hours    MEDICATIONS  (PRN):  benzocaine 15 mG/menthol 3.6 mG Lozenge 1 Lozenge Oral every 4 hours PRN Sore Throat  ondansetron Injectable 4 milliGRAM(s) IV Push every 6 hours PRN Nausea and/or Vomiting      ASSESSMENT / PLAN:  ----------------------------------------  Rash / Drug reaction - Infectious Disease consultation pending. On methylprednisolone and diphenhydramine. No respiratory compromise. Outpatient notes report that the patient was being treated for osteomyelitis and had recent stent placement for peripheral artery disease.    SIRS - Monitoring fever trend. Blood culture results to be followed.    Leg ulcer - On collagenase as per home wound care regimen.    Hyponatremia - Minimal. Repeat laboratory studies ordered to monitor.

## 2018-12-01 LAB
ANION GAP SERPL CALC-SCNC: 11 MMOL/L — SIGNIFICANT CHANGE UP (ref 5–17)
BUN SERPL-MCNC: 16 MG/DL — SIGNIFICANT CHANGE UP (ref 8–20)
CALCIUM SERPL-MCNC: 8.9 MG/DL — SIGNIFICANT CHANGE UP (ref 8.6–10.2)
CHLORIDE SERPL-SCNC: 110 MMOL/L — HIGH (ref 98–107)
CO2 SERPL-SCNC: 24 MMOL/L — SIGNIFICANT CHANGE UP (ref 22–29)
CREAT SERPL-MCNC: 0.59 MG/DL — SIGNIFICANT CHANGE UP (ref 0.5–1.3)
CULTURE RESULTS: SIGNIFICANT CHANGE UP
GLUCOSE SERPL-MCNC: 118 MG/DL — HIGH (ref 70–115)
HCT VFR BLD CALC: 33.6 % — LOW (ref 37–47)
HGB BLD-MCNC: 11.1 G/DL — LOW (ref 12–16)
MCHC RBC-ENTMCNC: 28.8 PG — SIGNIFICANT CHANGE UP (ref 27–31)
MCHC RBC-ENTMCNC: 33 G/DL — SIGNIFICANT CHANGE UP (ref 32–36)
MCV RBC AUTO: 87.3 FL — SIGNIFICANT CHANGE UP (ref 81–99)
PLATELET # BLD AUTO: 180 K/UL — SIGNIFICANT CHANGE UP (ref 150–400)
POTASSIUM SERPL-MCNC: 4.2 MMOL/L — SIGNIFICANT CHANGE UP (ref 3.5–5.3)
POTASSIUM SERPL-SCNC: 4.2 MMOL/L — SIGNIFICANT CHANGE UP (ref 3.5–5.3)
RBC # BLD: 3.85 M/UL — LOW (ref 4.4–5.2)
RBC # FLD: 13.5 % — SIGNIFICANT CHANGE UP (ref 11–15.6)
SODIUM SERPL-SCNC: 145 MMOL/L — SIGNIFICANT CHANGE UP (ref 135–145)
SPECIMEN SOURCE: SIGNIFICANT CHANGE UP
WBC # BLD: 11 K/UL — HIGH (ref 4.8–10.8)
WBC # FLD AUTO: 11 K/UL — HIGH (ref 4.8–10.8)

## 2018-12-01 PROCEDURE — 99232 SBSQ HOSP IP/OBS MODERATE 35: CPT

## 2018-12-01 PROCEDURE — 99233 SBSQ HOSP IP/OBS HIGH 50: CPT

## 2018-12-01 RX ORDER — OXYCODONE AND ACETAMINOPHEN 5; 325 MG/1; MG/1
1 TABLET ORAL EVERY 6 HOURS
Qty: 0 | Refills: 0 | Status: DISCONTINUED | OUTPATIENT
Start: 2018-12-01 | End: 2018-12-03

## 2018-12-01 RX ORDER — OXYCODONE HYDROCHLORIDE 5 MG/1
5 TABLET ORAL ONCE
Qty: 0 | Refills: 0 | Status: DISCONTINUED | OUTPATIENT
Start: 2018-12-01 | End: 2018-12-01

## 2018-12-01 RX ORDER — HYDROMORPHONE HYDROCHLORIDE 2 MG/ML
0.5 INJECTION INTRAMUSCULAR; INTRAVENOUS; SUBCUTANEOUS ONCE
Qty: 0 | Refills: 0 | Status: DISCONTINUED | OUTPATIENT
Start: 2018-12-01 | End: 2018-12-01

## 2018-12-01 RX ORDER — OXYCODONE AND ACETAMINOPHEN 5; 325 MG/1; MG/1
2 TABLET ORAL EVERY 6 HOURS
Qty: 0 | Refills: 0 | Status: DISCONTINUED | OUTPATIENT
Start: 2018-12-01 | End: 2018-12-03

## 2018-12-01 RX ADMIN — OXYCODONE HYDROCHLORIDE 5 MILLIGRAM(S): 5 TABLET ORAL at 06:40

## 2018-12-01 RX ADMIN — OXYCODONE AND ACETAMINOPHEN 2 TABLET(S): 5; 325 TABLET ORAL at 22:32

## 2018-12-01 RX ADMIN — MEROPENEM 100 MILLIGRAM(S): 1 INJECTION INTRAVENOUS at 13:13

## 2018-12-01 RX ADMIN — ENOXAPARIN SODIUM 40 MILLIGRAM(S): 100 INJECTION SUBCUTANEOUS at 11:43

## 2018-12-01 RX ADMIN — OXYCODONE AND ACETAMINOPHEN 2 TABLET(S): 5; 325 TABLET ORAL at 21:32

## 2018-12-01 RX ADMIN — OXYCODONE AND ACETAMINOPHEN 2 TABLET(S): 5; 325 TABLET ORAL at 12:38

## 2018-12-01 RX ADMIN — CHLORHEXIDINE GLUCONATE 1 APPLICATION(S): 213 SOLUTION TOPICAL at 11:44

## 2018-12-01 RX ADMIN — MEROPENEM 100 MILLIGRAM(S): 1 INJECTION INTRAVENOUS at 05:31

## 2018-12-01 RX ADMIN — HYDROMORPHONE HYDROCHLORIDE 0.5 MILLIGRAM(S): 2 INJECTION INTRAMUSCULAR; INTRAVENOUS; SUBCUTANEOUS at 09:20

## 2018-12-01 RX ADMIN — MEROPENEM 100 MILLIGRAM(S): 1 INJECTION INTRAVENOUS at 21:33

## 2018-12-01 RX ADMIN — Medication 25 MILLIGRAM(S): at 06:30

## 2018-12-01 RX ADMIN — HYDROMORPHONE HYDROCHLORIDE 0.5 MILLIGRAM(S): 2 INJECTION INTRAMUSCULAR; INTRAVENOUS; SUBCUTANEOUS at 08:59

## 2018-12-01 RX ADMIN — SODIUM CHLORIDE 100 MILLILITER(S): 9 INJECTION INTRAMUSCULAR; INTRAVENOUS; SUBCUTANEOUS at 08:59

## 2018-12-01 RX ADMIN — Medication 1 APPLICATION(S): at 11:43

## 2018-12-01 RX ADMIN — OXYCODONE AND ACETAMINOPHEN 2 TABLET(S): 5; 325 TABLET ORAL at 13:15

## 2018-12-01 RX ADMIN — ATORVASTATIN CALCIUM 20 MILLIGRAM(S): 80 TABLET, FILM COATED ORAL at 21:33

## 2018-12-01 RX ADMIN — OXYCODONE HYDROCHLORIDE 5 MILLIGRAM(S): 5 TABLET ORAL at 06:54

## 2018-12-01 RX ADMIN — CLOPIDOGREL BISULFATE 75 MILLIGRAM(S): 75 TABLET, FILM COATED ORAL at 11:43

## 2018-12-01 RX ADMIN — FAMOTIDINE 20 MILLIGRAM(S): 10 INJECTION INTRAVENOUS at 11:43

## 2018-12-01 NOTE — PROGRESS NOTE ADULT - ASSESSMENT
ASSESSMENT / PLAN:  ----------------------------------------  skin rash, possible Rash / Drug reaction - improving. Infectious Disease consultation noted. On methylprednisolone and diphenhydramine PRN. will start PO prednisone. No respiratory compromise. Outpatient notes report that the patient was being treated for osteomyelitis and had recent stent placement for peripheral artery disease.     SIRS - possibly due to PNA/wound infection. on IV meropenem. f/u all cx. trend temp adn WBC,  repeat pncx if febrile    Leg ulcer/Pseumonous on wound cx -  On collagenase as per home wound care regimen. wound care and ID on board    Hyponatremia - improved    DVT-P: lovenox ASSESSMENT / PLAN:  ----------------------------------------  skin rash, possible Rash / Drug reaction - improving. Infectious Disease consultation noted. On methylprednisolone and diphenhydramine PRN. No respiratory compromise. Outpatient notes report that the patient was being treated for osteomyelitis and had recent stent placement for peripheral artery disease.     SIRS - possibly due to PNA/wound infection. on IV meropenem. f/u all cx. trend temp adn WBC,  repeat pncx if febrile    Leg ulcer/Pseumonous on wound cx -  On collagenase as per home wound care regimen. wound care and ID on board. pain control    Hyponatremia - improved    DVT-P: lovenox ASSESSMENT / PLAN:  ----------------------------------------  skin rash, possible Rash / Drug reaction - improving. Infectious Disease consultation noted. On methylprednisolone and diphenhydramine PRN. No respiratory compromise. Outpatient notes report that the patient was being treated for osteomyelitis and had recent stent placement for peripheral artery disease.     SIRS - possibly due to PNA/wound infection. on IV meropenem. f/u all cx. trend temp adn WBC,  repeat pncx if febrile    Leg ulcer/Pseumonous on wound cx -  On collagenase as per home wound care regimen. wound care and ID on board. pain control    Hyponatremia - improved    anemia: hb dropped 13-11. no ssx of bleeding. mointor    DVT-P: lovenox

## 2018-12-01 NOTE — PROGRESS NOTE ADULT - SUBJECTIVE AND OBJECTIVE BOX
KARLA TOTH  ----------------------------------------  c/c: rash. left leg pain, wound healing slowly,PNA on IV Abx      seen t bedside. The patient is in no acute distress. rash getting better, still have mild to moderate pain over the wound controlled with pain meds. no other complaints reported. Denied any chest pain, palpitations, dyspnea, or abdominal pain. no n/v/d/c. no dysuria. no fever/chills      HPI:  This is a 58y  Female with HLD, PAD, who had been seen by Dr. Mcdonnell on 18, for evaluation of a left lateral lower leg ulcer present for more than a year.  pEr notes from the office, patient had ulcer after sustaining a bug bite, leading to a non healing ulcer.  The wound had decrease in size, but never completely healed.  Outpatient wound culture with Pseudomonas, not available for review at time of examination.  The patient also had LLE bypass for poor circulation. Patient had PICC line placed on or about Wednesday, 18 and had 1 dose of Cefepime on the afternoon, and evening at home.  She had a 3rd dose of Cefepime yesterday AM and had fevers, chills and body rash.  She was sent her for evaluation. She was found febrile to 102.8. Workup here is significant for patchy RLL infiltrate on CXR.  Urine and blood culture are sent and in process. Methylprednisolone and diphenhydramine were initiated for possible drug reaction. seen by ID, suggested unclear about drug reaction. CXr shows PNA. currently on IV meropenem without any adverse effect.       Vital Signs Last 24 Hrs  T(C): 37.2 (01 Dec 2018 08:05), Max: 37.2 (01 Dec 2018 08:05)  T(F): 98.9 (01 Dec 2018 08:05), Max: 98.9 (01 Dec 2018 08:05)  HR: 75 (01 Dec 2018 08:05) (67 - 90)  BP: 124/65 (01 Dec 2018 08:05) (85/54 - 124/65)  BP(mean): --  RR: 19 (01 Dec 2018 08:05) (18 - 19)  SpO2: 93% (01 Dec 2018 00:31) (93% - 95%)    PHYSICAL EXAMINATION:  ----------------------------------------  GENERAL: Not in distress. Alert . walking comfortably  HEENT:  Normocephalic and atraumatic. PEARLA,EOMI  NECK: Supple.  No JVD.    CARDIOVASCULAR: RRR S1, S2. No murmur/rubs/gallop  LUNGS: BLAE+, no rales, no wheezing, no rhonchi.    ABDOMEN: ND. Soft,  NT, no guarding / rebound / rigidity. BS normoactive. No CVA tenderness.    BACK: No spine tenderness.  EXTREMITIES: no cyanosis, no clubbing, no edema.   SKIN: erythematous rash over chest/abd and legs. left leg woend dressing is clean and dry  NEUROLOGIC: AAO*3.strength is symmetric, sensation intact, speech fluent.    PSYCHIATRIC: Calm.  No agitation.      LABORATORY STUDIES:  ----------------------------------------                        11.1   11.0  )-----------( 180      ( 01 Dec 2018 08:54 )             33.6       12-    145  |  110<H>  |  16.0  ----------------------------<  118<H>  4.2   |  24.0  |  0.59    Ca    8.9      01 Dec 2018 08:54    TPro  6.6  /  Alb  3.7  /  TBili  0.6  /  DBili  x   /  AST  11  /  ALT  7   /  AlkPhos  70  11-29    Urinalysis Basic - ( 2018 02:36 )  Color: Yellow / Appearance: Clear / S.010 / pH: x  Gluc: x / Ketone: Negative  / Bili: Negative / Urobili: Negative mg/dL   Blood: x / Protein: Negative mg/dL / Nitrite: Negative   Leuk Esterase: Negative / RBC: x / WBC x   Sq Epi: x / Non Sq Epi: x / Bacteria: x    Culture - Urine (18 @ 02:35)    Specimen Source: .Urine    Culture Results:   <10,000 CFU/ml Gram Positive Rods        MEDICATIONS  (STANDING):  atorvastatin 20 milliGRAM(s) Oral at bedtime  chlorhexidine 2% Cloths 1 Application(s) Topical daily  clopidogrel Tablet 75 milliGRAM(s) Oral daily  collagenase Ointment 1 Application(s) Topical daily  enoxaparin Injectable 40 milliGRAM(s) SubCutaneous daily  meropenem  IVPB 1000 milliGRAM(s) IV Intermittent every 8 hours  meropenem  IVPB      sodium chloride 0.9%. 1000 milliLiter(s) (100 mL/Hr) IV Continuous <Continuous>    MEDICATIONS  (PRN):  acetaminophen   Tablet .. 650 milliGRAM(s) Oral every 6 hours PRN Temp greater or equal to 38C (100.4F), Mild Pain (1 - 3)  benzocaine 15 mG/menthol 3.6 mG Lozenge 1 Lozenge Oral every 4 hours PRN Sore Throat  diphenhydrAMINE   Injectable 25 milliGRAM(s) IV Push every 6 hours PRN Rash and/or Itching  methylPREDNISolone sodium succinate Injectable 125 milliGRAM(s) IV Push every 8 hours PRN rash or allergies  ondansetron Injectable 4 milliGRAM(s) IV Push every 6 hours PRN Nausea and/or Vomiting  oxyCODONE    5 mG/acetaminophen 325 mG 1 Tablet(s) Oral every 6 hours PRN Moderate Pain (4 - 6)  oxyCODONE    5 mG/acetaminophen 325 mG 2 Tablet(s) Oral every 6 hours PRN Severe Pain (7 - 10)

## 2018-12-01 NOTE — PROGRESS NOTE ADULT - SUBJECTIVE AND OBJECTIVE BOX
[facility]  INFECTIOUS DISEASES AND INTERNAL MEDICINE at [place]  =======================================================  Diaz Campos MD  Diplomates American Board of Internal Medicine and Infectious Diseases  =======================================================    KARLA TOTH [id number]    Follow up: osteo  RASH      Allergies:  No Known Allergies      Medications:  acetaminophen   Tablet .. 650 milliGRAM(s) Oral every 6 hours PRN  atorvastatin 20 milliGRAM(s) Oral at bedtime  benzocaine 15 mG/menthol 3.6 mG Lozenge 1 Lozenge Oral every 4 hours PRN  chlorhexidine 2% Cloths 1 Application(s) Topical daily  clopidogrel Tablet 75 milliGRAM(s) Oral daily  collagenase Ointment 1 Application(s) Topical daily  diphenhydrAMINE   Injectable 25 milliGRAM(s) IV Push every 6 hours PRN  enoxaparin Injectable 40 milliGRAM(s) SubCutaneous daily  meropenem  IVPB 1000 milliGRAM(s) IV Intermittent every 8 hours  meropenem  IVPB      methylPREDNISolone sodium succinate Injectable 125 milliGRAM(s) IV Push every 8 hours PRN  ondansetron Injectable 4 milliGRAM(s) IV Push every 6 hours PRN  oxyCODONE    5 mG/acetaminophen 325 mG 1 Tablet(s) Oral every 6 hours PRN  oxyCODONE    5 mG/acetaminophen 325 mG 2 Tablet(s) Oral every 6 hours PRN  sodium chloride 0.9%. 1000 milliLiter(s) IV Continuous <Continuous>    SOCIAL       FAMILY   FAMILY HISTORY:  FH: CVA (cerebrovascular accident) (Mother)  FH: diabetes mellitus (Father)    REVIEW OF SYSTEMS:  CONSTITUTIONAL:  No Fever or chills  HEENT:   No diplopia or blurred vision.  No earache, sore throat or runny nose.  CARDIOVASCULAR:  No pressure, squeezing, strangling, tightness, heaviness or aching about the chest, neck, axilla or epigastrium.  RESPIRATORY:  No cough, shortness of breath, PND or orthopnea.  GASTROINTESTINAL:  No nausea, vomiting or diarrhea.  GENITOURINARY:  No dysuria, frequency or urgency. No Blood in urine  MUSCULOSKELETAL:   AS PER HPI  SKIN: AS PER HPI s.  NEUROLOGIC:  No paresthesias, fasciculations, seizures or weakness.  PSYCHIATRIC:  No disorder of thought or mood.  ENDOCRINE:  No heat or cold intolerance, polyuria or polydipsia.  HEMATOLOGICAL:  No easy bruising or bleeding.            Physical Exam:  ICU Vital Signs Last 24 Hrs  T(C): 37.2 (01 Dec 2018 08:05), Max: 37.2 (01 Dec 2018 08:05)  T(F): 98.9 (01 Dec 2018 08:05), Max: 98.9 (01 Dec 2018 08:05)  HR: 75 (01 Dec 2018 08:05) (67 - 90)  BP: 124/65 (01 Dec 2018 08:05) (85/54 - 124/65)  BP(mean): --  ABP: --  ABP(mean): --  RR: 19 (01 Dec 2018 08:05) (18 - 19)  SpO2: 93% (01 Dec 2018 00:31) (93% - 95%)    GEN: NAD, pleasant  HEENT: normocephalic and atraumatic. EOMI. SHANE.    NECK: Supple. No carotid bruits.  No lymphadenopathy or thyromegaly.  LUNGS: Clear to auscultation.  HEART: Regular rate and rhythm without murmur.  ABDOMEN: Soft, nontender, and nondistended.  Positive bowel sounds.    : No CVA tenderness  EXTREMITIES: Without any cyanosis, clubbing, rash, lesions or edema.  MSK: no joint swelling  NEUROLOGIC: Cranial nerves II through XII are grossly intact.  PSYCHIATRIC: Appropriate affect .  SKIN:  DIFFUSE MACULOPAPULAR  RASH TRUNK AND EXTREMTIES        Labs:      145  |  110<H>  |  16.0  ----------------------------<  118<H>  4.2   |  24.0  |  0.59    Ca    8.9      01 Dec 2018 08:54    TPro  6.6  /  Alb  3.7  /  TBili  0.6  /  DBili  x   /  AST  11  /  ALT  7   /  AlkPhos  70                            11.1   11.0  )-----------( 180      ( 01 Dec 2018 08:54 )             33.6       PT/INR - ( 2018 04:40 )   PT: 13.6 sec;   INR: 1.18 ratio         PTT - ( 2018 04:40 )  PTT:28.7 sec  Urinalysis Basic - ( 2018 02:36 )    Color: Yellow / Appearance: Clear / S.010 / pH: x  Gluc: x / Ketone: Negative  / Bili: Negative / Urobili: Negative mg/dL   Blood: x / Protein: Negative mg/dL / Nitrite: Negative   Leuk Esterase: Negative / RBC: x / WBC x   Sq Epi: x / Non Sq Epi: x / Bacteria: x      LIVER FUNCTIONS - ( 2018 17:54 )  Alb: 3.7 g/dL / Pro: 6.6 g/dL / ALK PHOS: 70 U/L / ALT: 7 U/L / AST: 11 U/L / GGT: x               CAPILLARY BLOOD GLUCOSE            RECENT CULTURES:   @ 02:35 .Urine     <10,000 CFU/ml Gram Positive Rods         @ 01:49          NotDete

## 2018-12-01 NOTE — PROGRESS NOTE ADULT - ASSESSMENT
This 58 y.o. F with PAD, HTN, here for fevers, had recently been started on Cefepime for left leg non healing ulcer and osteomyelitis.  PT DEVELOPED FEVER AND RASH CAME TO HOSPITAL  RASH SLIGHTLY IMPROVED AS PER PT  ALSO WITH POSSIBLY PNEUMONIA   ABX CHANGED TO MERREM  CONTINUE FOR NOW

## 2018-12-02 LAB
HCT VFR BLD CALC: 36.6 % — LOW (ref 37–47)
HGB BLD-MCNC: 11.9 G/DL — LOW (ref 12–16)
MCHC RBC-ENTMCNC: 29 PG — SIGNIFICANT CHANGE UP (ref 27–31)
MCHC RBC-ENTMCNC: 32.5 G/DL — SIGNIFICANT CHANGE UP (ref 32–36)
MCV RBC AUTO: 89.1 FL — SIGNIFICANT CHANGE UP (ref 81–99)
PLATELET # BLD AUTO: 215 K/UL — SIGNIFICANT CHANGE UP (ref 150–400)
RBC # BLD: 4.11 M/UL — LOW (ref 4.4–5.2)
RBC # FLD: 13.7 % — SIGNIFICANT CHANGE UP (ref 11–15.6)
WBC # BLD: 8.3 K/UL — SIGNIFICANT CHANGE UP (ref 4.8–10.8)
WBC # FLD AUTO: 8.3 K/UL — SIGNIFICANT CHANGE UP (ref 4.8–10.8)

## 2018-12-02 PROCEDURE — 99233 SBSQ HOSP IP/OBS HIGH 50: CPT

## 2018-12-02 RX ADMIN — OXYCODONE AND ACETAMINOPHEN 2 TABLET(S): 5; 325 TABLET ORAL at 22:02

## 2018-12-02 RX ADMIN — MEROPENEM 100 MILLIGRAM(S): 1 INJECTION INTRAVENOUS at 21:02

## 2018-12-02 RX ADMIN — CHLORHEXIDINE GLUCONATE 1 APPLICATION(S): 213 SOLUTION TOPICAL at 16:41

## 2018-12-02 RX ADMIN — CLOPIDOGREL BISULFATE 75 MILLIGRAM(S): 75 TABLET, FILM COATED ORAL at 11:27

## 2018-12-02 RX ADMIN — OXYCODONE AND ACETAMINOPHEN 2 TABLET(S): 5; 325 TABLET ORAL at 21:33

## 2018-12-02 RX ADMIN — OXYCODONE AND ACETAMINOPHEN 2 TABLET(S): 5; 325 TABLET ORAL at 15:44

## 2018-12-02 RX ADMIN — OXYCODONE AND ACETAMINOPHEN 2 TABLET(S): 5; 325 TABLET ORAL at 16:42

## 2018-12-02 RX ADMIN — Medication 25 MILLIGRAM(S): at 21:52

## 2018-12-02 RX ADMIN — MEROPENEM 100 MILLIGRAM(S): 1 INJECTION INTRAVENOUS at 06:55

## 2018-12-02 RX ADMIN — Medication 1 APPLICATION(S): at 11:28

## 2018-12-02 RX ADMIN — MEROPENEM 100 MILLIGRAM(S): 1 INJECTION INTRAVENOUS at 14:09

## 2018-12-02 RX ADMIN — OXYCODONE AND ACETAMINOPHEN 2 TABLET(S): 5; 325 TABLET ORAL at 10:12

## 2018-12-02 RX ADMIN — ATORVASTATIN CALCIUM 20 MILLIGRAM(S): 80 TABLET, FILM COATED ORAL at 21:03

## 2018-12-02 RX ADMIN — ENOXAPARIN SODIUM 40 MILLIGRAM(S): 100 INJECTION SUBCUTANEOUS at 11:28

## 2018-12-02 NOTE — PROGRESS NOTE ADULT - ASSESSMENT
ASSESSMENT / PLAN:  ----------------------------------------  skin rash, possible Rash / Drug reaction - improving. Infectious Disease consultation noted. On methylprednisolone and diphenhydramine PRN. No respiratory compromise. Outpatient notes report that the patient was being treated for osteomyelitis and had recent stent placement for peripheral artery disease.     SIRS - possibly due to PNA/wound infection. on IV meropenem. f/u all cx. trend temp adn WBC,  repeat pncx if febrile. need PICC line and home care    Leg ulcer/Pseumonous on wound cx -  On collagenase as per home wound care regimen. wound care and ID on board. pain control    Hyponatremia - improved    anemia: hb dropped 13-11. no ssx of bleeding. mointor    DVT-P: lovenox

## 2018-12-02 NOTE — PROGRESS NOTE ADULT - SUBJECTIVE AND OBJECTIVE BOX
KARLA TOTH  ----------------------------------------  c/c: rash. left leg pain, wound healing slowly,PNA on IV Abx. need home care and PICC line      seen at bedside. The patient is in no acute distress. rash getting better slowly. still have mild to moderate pain over the wound controlled with pain meds. no other complaints reported. Denied any chest pain, palpitations, dyspnea, or abdominal pain. no n/v/d/c. no dysuria. no fever/chills      HPI:  This is a 58y  Female with HLD, PAD, who had been seen by Dr. Mcdonnell on 18, for evaluation of a left lateral lower leg ulcer present for more than a year.  pEr notes from the office, patient had ulcer after sustaining a bug bite, leading to a non healing ulcer.  The wound had decrease in size, but never completely healed.  Outpatient wound culture with Pseudomonas, not available for review at time of examination.  The patient also had LLE bypass for poor circulation. Patient had PICC line placed on or about Wednesday, 18 and had 1 dose of Cefepime on the afternoon, and evening at home.  She had a 3rd dose of Cefepime yesterday AM and had fevers, chills and body rash.  She was sent her for evaluation. She was found febrile to 102.8. Workup here is significant for patchy RLL infiltrate on CXR.  Urine and blood culture are sent and in process. Methylprednisolone and diphenhydramine were initiated for possible drug reaction. seen by ID, suggested unclear about drug reaction. CXr shows PNA. currently on IV meropenem without any adverse effect.       Vital Signs Last 24 Hrs  T(C): 36.8 (02 Dec 2018 17:03), Max: 36.8 (02 Dec 2018 09:16)  T(F): 98.3 (02 Dec 2018 17:03), Max: 98.3 (02 Dec 2018 17:03)  HR: 78 (02 Dec 2018 17:03) (62 - 78)  BP: 127/71 (02 Dec 2018 17:03) (106/64 - 131/68)  BP(mean): --  RR: 18 (02 Dec 2018 17:03) (17 - 18)  SpO2: 97% (02 Dec 2018 17:03) (93% - 97%)    PHYSICAL EXAMINATION:  ----------------------------------------  GENERAL: Not in distress. Alert . sitting comfortably  HEENT:  Normocephalic and atraumatic  NECK: Supple.  No JVD.    CARDIOVASCULAR: RRR S1, S2. No murmur/rubs/gallop  LUNGS: BLAE+, no rales, no wheezing, no rhonchi.    ABDOMEN: ND. Soft,  NT, no guarding / rebound / rigidity. .    EXTREMITIES: no cyanosis, no clubbing, no edema.   SKIN: erythematous rash over chest/abd and legs simproving. left leg wound dressing is clean and dry  NEUROLOGIC: AAO*3. grossly intact  PSYCHIATRIC: Calm.  No agitation.      LABORATORY STUDIES:  ----------------------------------------                                   11.9   8.3   )-----------( 215      ( 02 Dec 2018 09:54 )             36.6       12-01    145  |  110<H>  |  16.0  ----------------------------<  118<H>  4.2   |  24.0  |  0.59    Ca    8.9      01 Dec 2018 08:54        Urinalysis Basic - ( 2018 02:36 )  Color: Yellow / Appearance: Clear / S.010 / pH: x  Gluc: x / Ketone: Negative  / Bili: Negative / Urobili: Negative mg/dL   Blood: x / Protein: Negative mg/dL / Nitrite: Negative   Leuk Esterase: Negative / RBC: x / WBC x   Sq Epi: x / Non Sq Epi: x / Bacteria: x    Culture - Urine (18 @ 02:35)    Specimen Source: .Urine    Culture Results:   <10,000 CFU/ml Gram Positive Rods    Culture - Blood (18 @ 18:30)    Specimen Source: .Blood    Culture Results:   No growth at 48 hours    Culture - Blood (18 @ 18:30)    Specimen Source: .Blood    Culture Results:   No growth at 48 hours    MEDICATIONS  (STANDING):  atorvastatin 20 milliGRAM(s) Oral at bedtime  chlorhexidine 2% Cloths 1 Application(s) Topical daily  clopidogrel Tablet 75 milliGRAM(s) Oral daily  collagenase Ointment 1 Application(s) Topical daily  enoxaparin Injectable 40 milliGRAM(s) SubCutaneous daily  meropenem  IVPB 1000 milliGRAM(s) IV Intermittent every 8 hours  meropenem  IVPB      sodium chloride 0.9%. 1000 milliLiter(s) (100 mL/Hr) IV Continuous <Continuous>    MEDICATIONS  (PRN):  acetaminophen   Tablet .. 650 milliGRAM(s) Oral every 6 hours PRN Temp greater or equal to 38C (100.4F), Mild Pain (1 - 3)  benzocaine 15 mG/menthol 3.6 mG Lozenge 1 Lozenge Oral every 4 hours PRN Sore Throat  diphenhydrAMINE   Injectable 25 milliGRAM(s) IV Push every 6 hours PRN Rash and/or Itching  methylPREDNISolone sodium succinate Injectable 125 milliGRAM(s) IV Push every 8 hours PRN rash or allergies  ondansetron Injectable 4 milliGRAM(s) IV Push every 6 hours PRN Nausea and/or Vomiting  oxyCODONE    5 mG/acetaminophen 325 mG 1 Tablet(s) Oral every 6 hours PRN Moderate Pain (4 - 6)  oxyCODONE    5 mG/acetaminophen 325 mG 2 Tablet(s) Oral every 6 hours PRN Severe Pain (7 - 10)

## 2018-12-02 NOTE — PROGRESS NOTE ADULT - ASSESSMENT
This 58 y.o. F with PAD, HTN, here for fevers, had recently been started on Cefepime for left leg non healing ulcer and osteomyelitis.  PT DEVELOPED FEVER AND RASH CAME TO HOSPITAL  RASH SLIGHTLY IMPROVED    ALSO WITH POSSIBLY PNEUMONIA   ABX CHANGED TO MERREM  AND IS TOLERATING IT  CONTINUE FOR NOW    PLAN D/C WITH PICC LINE ONCE HOME CARE ARRANGED  PT ALREADY HAD HOME CARE PRIOR TO ADMSSION  LEFT MESSAGE WITH CASE AILYN

## 2018-12-02 NOTE — PROGRESS NOTE ADULT - SUBJECTIVE AND OBJECTIVE BOX
[facility]  INFECTIOUS DISEASES AND INTERNAL MEDICINE at [place]  =======================================================  Diaz Campos MD  Diplomates American Board of Internal Medicine and Infectious Diseases  =======================================================    KARLA TOTH [id number]    Follow up: osteo  RASH      Allergies:  No Known Allergies      Medications:  acetaminophen   Tablet .. 650 milliGRAM(s) Oral every 6 hours PRN  atorvastatin 20 milliGRAM(s) Oral at bedtime  benzocaine 15 mG/menthol 3.6 mG Lozenge 1 Lozenge Oral every 4 hours PRN  chlorhexidine 2% Cloths 1 Application(s) Topical daily  clopidogrel Tablet 75 milliGRAM(s) Oral daily  collagenase Ointment 1 Application(s) Topical daily  diphenhydrAMINE   Injectable 25 milliGRAM(s) IV Push every 6 hours PRN  enoxaparin Injectable 40 milliGRAM(s) SubCutaneous daily  meropenem  IVPB 1000 milliGRAM(s) IV Intermittent every 8 hours  meropenem  IVPB      methylPREDNISolone sodium succinate Injectable 125 milliGRAM(s) IV Push every 8 hours PRN  ondansetron Injectable 4 milliGRAM(s) IV Push every 6 hours PRN  oxyCODONE    5 mG/acetaminophen 325 mG 1 Tablet(s) Oral every 6 hours PRN  oxyCODONE    5 mG/acetaminophen 325 mG 2 Tablet(s) Oral every 6 hours PRN  sodium chloride 0.9%. 1000 milliLiter(s) IV Continuous <Continuous>    SOCIAL       FAMILY   FAMILY HISTORY:  FH: CVA (cerebrovascular accident) (Mother)  FH: diabetes mellitus (Father)    REVIEW OF SYSTEMS:  CONSTITUTIONAL:  No Fever or chills  HEENT:   No diplopia or blurred vision.  No earache, sore throat or runny nose.  CARDIOVASCULAR:  No pressure, squeezing, strangling, tightness, heaviness or aching about the chest, neck, axilla or epigastrium.  RESPIRATORY:  No cough, shortness of breath, PND or orthopnea.  GASTROINTESTINAL:  No nausea, vomiting or diarrhea.  GENITOURINARY:  No dysuria, frequency or urgency. No Blood in urine  MUSCULOSKELETAL:   AS PER HPI  SKIN: AS PER HPI s.  NEUROLOGIC:  No paresthesias, fasciculations, seizures or weakness.  PSYCHIATRIC:  No disorder of thought or mood.  ENDOCRINE:  No heat or cold intolerance, polyuria or polydipsia.  HEMATOLOGICAL:  No easy bruising or bleeding.            Physical Exam:   Vital Signs Last 24 Hrs  T(C): 36.8 (02 Dec 2018 09:16), Max: 36.9 (01 Dec 2018 17:15)  T(F): 98.2 (02 Dec 2018 09:16), Max: 98.5 (01 Dec 2018 17:15)  HR: 77 (02 Dec 2018 09:16) (62 - 77)  BP: 129/66 (02 Dec 2018 09:16) (105/67 - 131/68)  BP(mean): --  RR: 17 (02 Dec 2018 09:16) (17 - 18)  SpO2: 93% (02 Dec 2018 09:16) (93% - 97%)    GEN: NAD, pleasant  HEENT: normocephalic and atraumatic. EOMI. SHANE.    NECK: Supple. No carotid bruits.  No lymphadenopathy or thyromegaly.  LUNGS: Clear to auscultation.  HEART: Regular rate and rhythm without murmur.  ABDOMEN: Soft, nontender, and nondistended.  Positive bowel sounds.    : No CVA tenderness  EXTREMITIES: Without any cyanosis, clubbing, rash, lesions or edema.  MSK: no joint swelling  NEUROLOGIC: Cranial nerves II through XII are grossly intact.  PSYCHIATRIC: Appropriate affect .  SKIN:  DIFFUSE MACULOPAPULAR  RASH TRUNK AND EXTREMTIES        Labs:                         11.9   8.3   )-----------( 215      ( 02 Dec 2018 09:54 )             36.6         12-01    145  |  110<H>  |  16.0  ----------------------------<  118<H>  4.2   |  24.0  |  0.59    Ca    8.9      01 Dec 2018 08:54            RECENT CULTURES:  11-30 @ 02:35 .Urine     <10,000 CFU/ml Gram Positive Rods        11-30 @ 01:49          NotDetec

## 2018-12-03 VITALS
DIASTOLIC BLOOD PRESSURE: 83 MMHG | RESPIRATION RATE: 18 BRPM | SYSTOLIC BLOOD PRESSURE: 153 MMHG | TEMPERATURE: 98 F | HEART RATE: 79 BPM | OXYGEN SATURATION: 98 %

## 2018-12-03 DIAGNOSIS — R50.9 FEVER, UNSPECIFIED: ICD-10-CM

## 2018-12-03 PROCEDURE — 99239 HOSP IP/OBS DSCHRG MGMT >30: CPT

## 2018-12-03 RX ORDER — MEROPENEM 1 G/30ML
1 INJECTION INTRAVENOUS
Qty: 0 | Refills: 0 | DISCHARGE
Start: 2018-12-03 | End: 2019-01-08

## 2018-12-03 RX ADMIN — MEROPENEM 100 MILLIGRAM(S): 1 INJECTION INTRAVENOUS at 14:28

## 2018-12-03 RX ADMIN — Medication 1 APPLICATION(S): at 11:18

## 2018-12-03 RX ADMIN — OXYCODONE AND ACETAMINOPHEN 2 TABLET(S): 5; 325 TABLET ORAL at 05:07

## 2018-12-03 RX ADMIN — OXYCODONE AND ACETAMINOPHEN 2 TABLET(S): 5; 325 TABLET ORAL at 11:13

## 2018-12-03 RX ADMIN — ENOXAPARIN SODIUM 40 MILLIGRAM(S): 100 INJECTION SUBCUTANEOUS at 11:18

## 2018-12-03 RX ADMIN — CHLORHEXIDINE GLUCONATE 1 APPLICATION(S): 213 SOLUTION TOPICAL at 14:21

## 2018-12-03 RX ADMIN — MEROPENEM 100 MILLIGRAM(S): 1 INJECTION INTRAVENOUS at 05:07

## 2018-12-03 RX ADMIN — OXYCODONE AND ACETAMINOPHEN 2 TABLET(S): 5; 325 TABLET ORAL at 12:09

## 2018-12-03 RX ADMIN — CLOPIDOGREL BISULFATE 75 MILLIGRAM(S): 75 TABLET, FILM COATED ORAL at 11:17

## 2018-12-03 NOTE — PROGRESS NOTE ADULT - ASSESSMENT
58 y.o F with PAD, HTN, here for fevers, had recently been started on Cefepime for left leg non healing ulcer and osteomyelitis, now on Meropenem    Fever  left leg non healing ulcer and osteomyelitis    - unclear if this is a drug reaction  - Continue Meropenem 1gm IV q 8 hours  - Culture of foot with pseudomonas  - No reactions in the hospital  - Blood cultures no growth  - follow up all outstanding cultures  - trend temperature and WBC curve  - repeat cultures from blood and all sources if febrile  - Will send script to [facility]  - PICC is in place.   - Patient has an appointment with me 12/11/18    Will sign off. Pleas call PRN

## 2018-12-03 NOTE — PROGRESS NOTE ADULT - ASSESSMENT
58 y.o F with PAD, HTN, here for fevers, had recently been started on Cefepime for left leg non healing ulcer and osteomyelitis, now on Meropenem  (Patient to leave AMA for unnapproved insurance)     - Patient has an appointment with Dr. Mcdonnell 12/11/18       Problem/Plan - 1:  ·  Problem: Fever - resolved     Problem/Plan - 2:  ·  Problem: Ulcer of left lower extremity with fat layer exposed.       left leg non healing ulcer and osteomyelitis           Plan:    Continue Meropenem 1gm IV q 8 hours  - PICC is in place.        Event:  At time of discharge insurance was not approved and patient stated to go home AMA and get insurance records. Plan discussed with care coordinator and to be arranged per her plan for home infusion. Was unable to discuss management and plan for risks and benefits to patients care and leaving against medical advice.

## 2018-12-03 NOTE — DISCHARGE NOTE ADULT - PATIENT PORTAL LINK FT
You can access the [facility], offered by [facility], by registering with the following website: [email or web address]

## 2018-12-03 NOTE — PROGRESS NOTE ADULT - SUBJECTIVE AND OBJECTIVE BOX
[facility]    [id number]  KARLA TOTH    Rapid Response was called on a 58y year old Female patient for ___  Patient has a past medical history of ___    Patient was seen and examined at the bedside by the rapid response team.    Allergies    No Known Allergies    Intolerances        PAST MEDICAL & SURGICAL HISTORY:  HLD (hyperlipidemia)  No significant past surgical history      Vital Signs Last 24 Hrs  T(C): 36.7 (03 Dec 2018 08:22), Max: 36.7 (03 Dec 2018 08:22)  T(F): 98 (03 Dec 2018 08:22), Max: 98 (03 Dec 2018 08:22)  HR: 79 (03 Dec 2018 08:22) (79 - 81)  BP: 153/83 (03 Dec 2018 08:22) (128/62 - 153/83)  BP(mean): --  RR: 18 (03 Dec 2018 08:22) (18 - 18)  SpO2: 98% (03 Dec 2018 08:22) (98% - 99%)          PHYSICAL EXAM:    GENERAL: NAD, well-groomed, well-developed  HEAD:  Atraumatic, Normocephalic  EYES: EOMI, PERRLA, conjunctiva and sclera clear  ENMT: No tonsillar erythema, exudates, or enlargement; Moist mucous membranes, Good dentition, No lesions  NECK: Supple, No JVD, Normal thyroid  NERVOUS SYSTEM:  Alert & Oriented X3, Good concentration; Motor Strength 5/5 B/L upper and lower extremities; DTRs 2+ intact and symmetric  CHEST/LUNG: Clear to percussion bilaterally; No rales, rhonchi, wheezing, or rubs  HEART: Regular rate and rhythm; No murmurs, rubs, or gallops  ABDOMEN: Soft, Nontender, Nondistended; Bowel sounds present  EXTREMITIES:  2+ Peripheral Pulses  LYMPH: No lymphadenopathy noted  SKIN: No rashes or lesions                              11.9   8.3   )-----------( 215      ( 02 Dec 2018 09:54 )             36.6              ICU Vital Signs Last 24 Hrs  T(C): 36.7 (03 Dec 2018 08:22), Max: 36.7 (03 Dec 2018 08:22)  T(F): 98 (03 Dec 2018 08:22), Max: 98 (03 Dec 2018 08:22)  HR: 79 (03 Dec 2018 08:22) (79 - 81)  BP: 153/83 (03 Dec 2018 08:22) (128/62 - 153/83)  BP(mean): --  ABP: --  ABP(mean): --  RR: 18 (03 Dec 2018 08:22) (18 - 18)  SpO2: 98% (03 Dec 2018 08:22) (98% - 99%)           Patient to leave AMA for unnapproved insurance [id number]  KARLALEATHA TOTH    Patient was seen and examined at the bedside by Hospitalist/PA team.  This is a 58y  Female with HLD, PAD, who had been seen by Dr. Mcdonnell on 11/21/18, for evaluation of a left lateral lower leg ulcer present for more than a year.  per notes from the office, patient had ulcer after sustaining a bug bite, leading to a non healing ulcer.  The wound had decrease in size, but never completely healed.  Outpatient wound culture with Pseudomonas, not available for review at time of examination.  The patient also had LLE bypass for poor circulation. Patient had PICC line placed on or about Wednesday, 11/28/18 and had 1 dose of Cefepime on the afternoon, and evening at home.  She had a 3rd dose of Cefepime yesterday AM and had fevers, chills and body rash.  She was sent her for evaluation. She was found febrile to 102.8. Workup here is significant for patchy RLL infiltrate on CXR. Methylprednisolone and diphenhydramine were initiated for possible drug reaction. seen by ID, suggested unclear about drug reaction. CXr shows PNA. currently on IV meropenem without any adverse effect. blood cultures been negative. Id recommended PICC line and meropenem till 1/8/19 and has an appointment with ID on 12/11/18. Patient is medically ready for discharge. SW arranged for home care set up with wound care.    Allergies    No Known Allergies    Intolerances        PAST MEDICAL & SURGICAL HISTORY:  HLD (hyperlipidemia)  No significant past surgical history      Vital Signs Last 24 Hrs  T(C): 36.7 (03 Dec 2018 08:22), Max: 36.7 (03 Dec 2018 08:22)  T(F): 98 (03 Dec 2018 08:22), Max: 98 (03 Dec 2018 08:22)  HR: 79 (03 Dec 2018 08:22) (79 - 81)  BP: 153/83 (03 Dec 2018 08:22) (128/62 - 153/83)  BP(mean): --  RR: 18 (03 Dec 2018 08:22) (18 - 18)  SpO2: 98% (03 Dec 2018 08:22) (98% - 99%)          PHYSICAL EXAM:    GENERAL: NAD, well-groomed, well-developed  HEAD:  Atraumatic, Normocephalic  EYES: EOMI, PERRLA, conjunctiva and sclera clear  ENMT: No tonsillar erythema, exudates, or enlargement; Moist mucous membranes, Good dentition, No lesions  NECK: Supple, No JVD, Normal thyroid  NERVOUS SYSTEM:  Alert & Oriented X3, Good concentration; Motor Strength 5/5 B/L upper and lower extremities; DTRs 2+ intact and symmetric  CHEST/LUNG: Clear to percussion bilaterally; No rales, rhonchi, wheezing, or rubs  HEART: Regular rate and rhythm; No murmurs, rubs, or gallops  ABDOMEN: Soft, Nontender, Nondistended; Bowel sounds present  EXTREMITIES:  2+ Peripheral Pulses  LYMPH: No lymphadenopathy noted  SKIN: No rashes or lesions                        11.9   8.3   )-----------( 215      ( 02 Dec 2018 09:54 )             36.6            ICU Vital Signs Last 24 Hrs  T(C): 36.7 (03 Dec 2018 08:22), Max: 36.7 (03 Dec 2018 08:22)  T(F): 98 (03 Dec 2018 08:22), Max: 98 (03 Dec 2018 08:22)  HR: 79 (03 Dec 2018 08:22) (79 - 81)  BP: 153/83 (03 Dec 2018 08:22) (128/62 - 153/83)  BP(mean): --  ABP: --  ABP(mean): --  RR: 18 (03 Dec 2018 08:22) (18 - 18)  SpO2: 98% (03 Dec 2018 08:22) (98% - 99%)

## 2018-12-03 NOTE — DISCHARGE NOTE ADULT - MEDICATION SUMMARY - MEDICATIONS TO TAKE
I will START or STAY ON the medications listed below when I get home from the hospital:    Lipitor 20 mg oral tablet  -- 1 tab(s) by mouth once a day  -- Indication: For per md    Plavix 75 mg oral tablet  -- 1 tab(s) by mouth once a day  -- Indication: For per md    meropenem  -- 1 gram(s) intravenous every 8 hours  -- Indication: For per ID I will START or STAY ON the medications listed below when I get home from the hospital:    oxycodone-acetaminophen 5 mg-300 mg oral tablet  -- 1 tab(s) by mouth 2 times a day MDD:10 mg, 2 tab  -- Caution federal law prohibits the transfer of this drug to any person other  than the person for whom it was prescribed.  May cause drowsiness.  Alcohol may intensify this effect.  Use care when operating dangerous machinery.  This drug may impair the ability to drive or operate machinery.  Use care until you become familiar with its effects.  This prescription cannot be refilled.  This product contains acetaminophen.  Do not use  with any other product containing acetaminophen to prevent possible liver damage.  Using more of this medication than prescribed may cause serious breathing problems.    -- Indication: For pain    Lipitor 20 mg oral tablet  -- 1 tab(s) by mouth once a day  -- Indication: For High cholesterol    Plavix 75 mg oral tablet  -- 1 tab(s) by mouth once a day  -- Indication: For Home meds    meropenem  -- 1 gram(s) intravenous every 8 hours  -- Indication: For OM

## 2018-12-03 NOTE — DISCHARGE NOTE ADULT - HOSPITAL COURSE
This is a 58y  Female with HLD, PAD, who had been seen by Dr. Mcdonnell on 11/21/18, for evaluation of a left lateral lower leg ulcer present for more than a year.  pEr notes from the office, patient had ulcer after sustaining a bug bite, leading to a non healing ulcer.  The wound had decrease in size, but never completely healed.  Outpatient wound culture with Pseudomonas, not available for review at time of examination.  The patient also had LLE bypass for poor circulation. Patient had PICC line placed on or about Wednesday, 11/28/18 and had 1 dose of Cefepime on the afternoon, and evening at home.  She had a 3rd dose of Cefepime yesterday AM and had fevers, chills and body rash.  She was sent her for evaluation. She was found febrile to 102.8. Workup here is significant for patchy RLL infiltrate on CXR.  Urine and blood culture are sent and in process. Methylprednisolone and diphenhydramine were initiated for possible drug reaction. seen by ID, suggested unclear about drug reaction. CXr shows PNA. currently on IV meropenem without any adverse effect. This is a 58y  Female with HLD, PAD, who had been seen by Dr. Mcdonnell on 11/21/18, for evaluation of a left lateral lower leg ulcer present for more than a year.  pEr notes from the office, patient had ulcer after sustaining a bug bite, leading to a non healing ulcer.  The wound had decrease in size, but never completely healed.  Outpatient wound culture with Pseudomonas, not available for review at time of examination.  The patient also had LLE bypass for poor circulation. Patient had PICC line placed on or about Wednesday, 11/28/18 and had 1 dose of Cefepime on the afternoon, and evening at home.  She had a 3rd dose of Cefepime yesterday AM and had fevers, chills and body rash.  She was sent her for evaluation. She was found febrile to 102.8. Workup here is significant for patchy RLL infiltrate on CXR.  Urine and blood culture are sent and in process. Methylprednisolone and diphenhydramine were initiated for possible drug reaction. seen by ID, suggested unclear about drug reaction. CXr shows PNA. currently on IV meropenem without any adverse effect.       PHYSICAL EXAMINATION:  ----------------------------------------  GENERAL: Not in distress. Alert . sitting comfortably  HEENT:  Normocephalic and atraumatic  NECK: Supple.  No JVD.    CARDIOVASCULAR: RRR S1, S2. No murmur/rubs/gallop  LUNGS: BLAE+, no rales, no wheezing, no rhonchi.    ABDOMEN: ND. Soft,  NT, no guarding / rebound / rigidity. .    EXTREMITIES: no cyanosis, no clubbing, no edema.   SKIN: erythematous rash over chest/abd and legs simproving. left leg wound dressing is clean and dry  NEUROLOGIC: AAO*3. grossly intact  PSYCHIATRIC: Calm.  No agitation. This is a 58y  Female with HLD, PAD, who had been seen by Dr. Mcdonnell on 11/21/18, for evaluation of a left lateral lower leg ulcer present for more than a year.  pEr notes from the office, patient had ulcer after sustaining a bug bite, leading to a non healing ulcer.  The wound had decrease in size, but never completely healed.  Outpatient wound culture with Pseudomonas, not available for review at time of examination.  The patient also had LLE bypass for poor circulation. Patient had PICC line placed on or about Wednesday, 11/28/18 and had 1 dose of Cefepime on the afternoon, and evening at home.  She had a 3rd dose of Cefepime yesterday AM and had fevers, chills and body rash.  She was sent her for evaluation. She was found febrile to 102.8. Workup here is significant for patchy RLL infiltrate on CXR. Methylprednisolone and diphenhydramine were initiated for possible drug reaction. seen by ID, suggested unclear about drug reaction. CXr shows PNA. currently on IV meropenem without any adverse effect. blood cultures been negative. Id recommended PICC line and meropenem till 1/8/19 and has an appointment with ID on 12/11/18. Patient is medically ready for discharge. SW arranged for home care set up with wound care.      PHYSICAL EXAMINATION:  ----------------------------------------  GENERAL: Not in distress. Alert . sitting comfortably, picc line in place.  HEENT:  Normocephalic and atraumatic  NECK: Supple.  No JVD.    CARDIOVASCULAR: RRR S1, S2. No murmur/rubs/gallop  LUNGS: BLAE+, no rales, no wheezing, no rhonchi.    ABDOMEN: ND. Soft,  NT, no guarding / rebound / rigidity. .    EXTREMITIES: no cyanosis, no clubbing, no edema.   SKIN:. left leg wound dressing is clean and dry. covered with dressing  NEUROLOGIC: AAO*3. grossly intact  PSYCHIATRIC: Calm.  No agitation.    Vital Signs Last 24 Hrs  T(C): 36.8 (02 Dec 2018 17:03), Max: 36.8 (02 Dec 2018 09:16)  T(F): 98.3 (02 Dec 2018 17:03), Max: 98.3 (02 Dec 2018 17:03)  HR: 78 (02 Dec 2018 17:03) (62 - 78)  BP: 127/71 (02 Dec 2018 17:03) (106/64 - 131/68)  BP(mean): --  RR: 18 (02 Dec 2018 17:03) (17 - 18)  SpO2: 97% (02 Dec 2018 17:03) (93% - 97%)      I spent 35 min in discharging the patient. This is a 58y  Female with HLD, PAD, who had been seen by Dr. Mcdonnell on 11/21/18, for evaluation of a left lateral lower leg ulcer present for more than a year.  pEr notes from the office, patient had ulcer after sustaining a bug bite, leading to a non healing ulcer.  The wound had decrease in size, but never completely healed.  Outpatient wound culture with Pseudomonas, not available for review at time of examination.  The patient also had LLE bypass for poor circulation. Patient had PICC line placed on or about Wednesday, 11/28/18 and had 1 dose of Cefepime on the afternoon, and evening at home.  She had a 3rd dose of Cefepime yesterday AM and had fevers, chills and body rash.  She was sent her for evaluation. She was found febrile to 102.8. Workup here is significant for patchy RLL infiltrate on CXR. Methylprednisolone and diphenhydramine were initiated for possible drug reaction. seen by ID, suggested unclear about drug reaction. CXr shows PNA. currently on IV meropenem without any adverse effect. blood cultures been negative. Id recommended PICC line and meropenem till 1/8/19 and has an appointment with ID on 12/11/18. Patient is medically ready for discharge. SW arranged for home care set up with wound care.      PHYSICAL EXAMINATION:  ----------------------------------------  GENERAL: Not in distress. Alert . sitting comfortably, picc line in place.  HEENT:  Normocephalic and atraumatic  NECK: Supple.  No JVD.    CARDIOVASCULAR: RRR S1, S2. No murmur/rubs/gallop  LUNGS: BLAE+, no rales, no wheezing, no rhonchi.    ABDOMEN: ND. Soft,  NT, no guarding / rebound / rigidity. .    EXTREMITIES: no cyanosis, no clubbing, no edema.   SKIN:. left leg wound dressing is clean and dry.  NEUROLOGIC: AAO*3. grossly intact  PSYCHIATRIC: Calm.  No agitation.    Vital Signs Last 24 Hrs  T(C): 36.8 (02 Dec 2018 17:03), Max: 36.8 (02 Dec 2018 09:16)  T(F): 98.3 (02 Dec 2018 17:03), Max: 98.3 (02 Dec 2018 17:03)  HR: 78 (02 Dec 2018 17:03) (62 - 78)  BP: 127/71 (02 Dec 2018 17:03) (106/64 - 131/68)  BP(mean): --  RR: 18 (02 Dec 2018 17:03) (17 - 18)  SpO2: 97% (02 Dec 2018 17:03) (93% - 97%)      I spent 35 min in discharging the patient.

## 2018-12-03 NOTE — DISCHARGE NOTE ADULT - PLAN OF CARE
antibiotic therapy for several weeks end date approx 1/8 Osteomyelitis -  antibiotic therapy Needs to complete meropenem till 1/8/19. and follow up with ID on dec 11 /2018 not while inpatient. ID sugegsted to continue meropenem.

## 2018-12-03 NOTE — DISCHARGE NOTE ADULT - CARE PROVIDER_API CALL
Jay Mcdonnell), Infectious Disease; Internal Medicine  [address] [place] [address]  Phone: [phone]  Fax: [phone]

## 2018-12-04 ENCOUNTER — MEDICATION RENEWAL (OUTPATIENT)
Age: 58
End: 2018-12-04

## 2018-12-04 LAB
CULTURE RESULTS: SIGNIFICANT CHANGE UP
CULTURE RESULTS: SIGNIFICANT CHANGE UP
SPECIMEN SOURCE: SIGNIFICANT CHANGE UP
SPECIMEN SOURCE: SIGNIFICANT CHANGE UP

## 2018-12-11 ENCOUNTER — APPOINTMENT (OUTPATIENT)
Dept: INTERNAL MEDICINE | Facility: CLINIC | Age: 58
End: 2018-12-11
Payer: COMMERCIAL

## 2018-12-11 VITALS — WEIGHT: 235 LBS | SYSTOLIC BLOOD PRESSURE: 162 MMHG | BODY MASS INDEX: 37.93 KG/M2 | DIASTOLIC BLOOD PRESSURE: 100 MMHG

## 2018-12-11 PROCEDURE — 99214 OFFICE O/P EST MOD 30 MIN: CPT | Mod: 25

## 2018-12-11 PROCEDURE — 99495 TRANSJ CARE MGMT MOD F2F 14D: CPT

## 2018-12-12 PROCEDURE — C1751: CPT

## 2018-12-12 PROCEDURE — 76000 FLUOROSCOPY <1 HR PHYS/QHP: CPT

## 2018-12-12 PROCEDURE — 76942 ECHO GUIDE FOR BIOPSY: CPT

## 2018-12-12 PROCEDURE — 36569 INSJ PICC 5 YR+ W/O IMAGING: CPT

## 2019-01-08 ENCOUNTER — APPOINTMENT (OUTPATIENT)
Dept: INTERNAL MEDICINE | Facility: CLINIC | Age: 59
End: 2019-01-08
Payer: COMMERCIAL

## 2019-01-08 VITALS
BODY MASS INDEX: 37.77 KG/M2 | SYSTOLIC BLOOD PRESSURE: 105 MMHG | WEIGHT: 235 LBS | HEIGHT: 66 IN | DIASTOLIC BLOOD PRESSURE: 91 MMHG

## 2019-01-08 PROBLEM — E78.5 HYPERLIPIDEMIA, UNSPECIFIED: Chronic | Status: ACTIVE | Noted: 2018-11-29

## 2019-01-08 PROCEDURE — 99214 OFFICE O/P EST MOD 30 MIN: CPT

## 2019-01-08 NOTE — HISTORY OF PRESENT ILLNESS
[FreeTextEntry1] : 50-year-old female he would prefer to disease hypertension. Patient has been treated for left leg nonhealing ulcer and osteomyelitis. Patient had infection with culture growing Pseudomonas. Patient was initially started on IV cefepime which he developed a rash and fever to and was hospitalized at [facility] at which time she wishes to IV meropenem. Patient has since been on IV meropenem. She has been tolerating the antibiotics with no reactions or complications. The wound is healing and she sees a podiatrist weekly for wound care.

## 2019-01-08 NOTE — ASSESSMENT
[FreeTextEntry1] : 58-year-old female with a history of hyperlipidemia and peripheral arterial disease here with nonhealing left lower extremity ulcer and osteomyelitis on bone scan\par \par Acute osteomyelitis of left lower extremity\par Left lower extremity ulcer\par Peripheral arterial disease status post bypass\par Active smoker\par \par Recommendations:\par Continue meropenem 1 g q.8 hours\par Labs reviewed\par We'll plan to complete total of 6 weeks of antibiotics on January 13, 2018.\par Continue wound care\par Patient advised to quit smoking to promote wound healing\par \par Followup in three-months\par \par Counseling included lab results, differential diagnosis, treatment options, risks and benefits, lifestyle changes, current condition, medications and dose adjustments.\par The patient was interactive attentive and asked questions and verbalized understanding.\par

## 2019-01-08 NOTE — PHYSICAL EXAM
[General Appearance - Alert] : alert [General Appearance - In No Acute Distress] : in no acute distress [General Appearance - Well-Appearing] : healthy appearing [Sclera] : the sclera and conjunctiva were normal [PERRL With Normal Accommodation] : pupils were equal in size, round, reactive to light [Extraocular Movements] : extraocular movements were intact [Outer Ear] : the ears and nose were normal in appearance [Examination Of The Oral Cavity] : the lips and gums were normal [Oropharynx] : the oropharynx was normal with no thrush [Neck Appearance] : the appearance of the neck was normal [] : no respiratory distress [Respiration, Rhythm And Depth] : normal respiratory rhythm and effort [Exaggerated Use Of Accessory Muscles For Inspiration] : no accessory muscle use [Auscultation Breath Sounds / Voice Sounds] : lungs were clear to auscultation bilaterally [Heart Rate And Rhythm] : heart rate was normal and rhythm regular [Heart Sounds] : normal S1 and S2 [Edema] : there was no peripheral edema [Bowel Sounds] : normal bowel sounds [Abdomen Soft] : soft [Costovertebral Angle Tenderness] : no CVA tenderness [Musculoskeletal - Swelling] : no joint swelling [Range of Motion to Joints] : range of motion to joints [FreeTextEntry1] : Left leg ulcer, healing no discharge. no sign of infection or cellulitis  [Motor Exam] : the motor exam was normal [Oriented To Time, Place, And Person] : oriented to person, place, and time [Affect] : the affect was normal

## 2019-04-02 ENCOUNTER — APPOINTMENT (OUTPATIENT)
Dept: INTERNAL MEDICINE | Facility: CLINIC | Age: 59
End: 2019-04-02
Payer: MEDICAID

## 2019-04-02 VITALS
BODY MASS INDEX: 33.75 KG/M2 | HEIGHT: 66 IN | HEART RATE: 98 BPM | SYSTOLIC BLOOD PRESSURE: 116 MMHG | WEIGHT: 210 LBS | DIASTOLIC BLOOD PRESSURE: 98 MMHG

## 2019-04-02 PROCEDURE — 36415 COLL VENOUS BLD VENIPUNCTURE: CPT

## 2019-04-02 PROCEDURE — 99214 OFFICE O/P EST MOD 30 MIN: CPT | Mod: 25

## 2019-04-02 NOTE — ASSESSMENT
[FreeTextEntry1] : 58-year-old female with a history of hyperlipidemia and peripheral arterial disease here with nonhealing left lower extremity ulcer and osteomyelitis on bone scan\par \par Acute osteomyelitis of left lower extremity\par Left lower extremity ulcer\par Peripheral arterial disease status post bypass\par Active smoker\par \par Recommendations:\par Completed a six-week course of meropenem\par Labs reviewed\par Continue wound care\par Patient advised to quit smoking to promote wound healing\par Check ESR and CRP\par \par Follow up as needed\par \par Counseling included lab results, differential diagnosis, treatment options, risks and benefits, lifestyle changes, current condition, medications and dose adjustments.\par The patient was interactive attentive and asked questions and verbalized understanding.\par

## 2019-04-02 NOTE — HISTORY OF PRESENT ILLNESS
[FreeTextEntry1] : 50-year-old female here for follow up of left leg nonhealing ulcer and osteomyelitis. Completed 6 weeks of IV meropenem. Patient is seeing podiatry on a weekly basis. Her ulcer has improved. No fever no chills. No discharge from the ulcer. No complaints.\par

## 2019-04-04 LAB
CRP SERPL-MCNC: 0.53 MG/DL
ERYTHROCYTE [SEDIMENTATION RATE] IN BLOOD BY WESTERGREN METHOD: 26 MM/HR

## 2019-06-17 ENCOUNTER — APPOINTMENT (OUTPATIENT)
Dept: VASCULAR SURGERY | Facility: CLINIC | Age: 59
End: 2019-06-17
Payer: MEDICAID

## 2019-06-17 VITALS
TEMPERATURE: 98.1 F | HEART RATE: 94 BPM | WEIGHT: 227 LBS | HEIGHT: 66 IN | OXYGEN SATURATION: 88 % | DIASTOLIC BLOOD PRESSURE: 91 MMHG | BODY MASS INDEX: 36.48 KG/M2 | SYSTOLIC BLOOD PRESSURE: 157 MMHG

## 2019-06-17 DIAGNOSIS — Z86.39 PERSONAL HISTORY OF OTHER ENDOCRINE, NUTRITIONAL AND METABOLIC DISEASE: ICD-10-CM

## 2019-06-17 PROCEDURE — 99203 OFFICE O/P NEW LOW 30 MIN: CPT

## 2019-06-17 PROCEDURE — 93923 UPR/LXTR ART STDY 3+ LVLS: CPT

## 2019-06-17 PROCEDURE — 99243 OFF/OP CNSLTJ NEW/EST LOW 30: CPT

## 2019-06-17 PROCEDURE — 93926 LOWER EXTREMITY STUDY: CPT

## 2019-06-17 RX ORDER — CLOPIDOGREL 75 MG/1
75 TABLET, FILM COATED ORAL
Refills: 0 | Status: DISCONTINUED | COMMUNITY
End: 2019-06-17

## 2019-06-17 NOTE — PHYSICAL EXAM
[Respiratory Effort] : normal respiratory effort [Normal Rate and Rhythm] : normal rate and rhythm [2+] : right 2+ [Ankle Swelling (On Exam)] : present [0] : left 0 [Ankle Swelling On The Left] : of the left ankle [Ankle Swelling On The Right] : mild [Alert] : alert [Oriented to Person] : oriented to person [Oriented to Place] : oriented to place [Oriented to Time] : oriented to time [Calm] : calm [JVD] : no jugular venous distention  [Carotid Bruits] : no carotid bruits [] : not present [Varicose Veins Of Lower Extremities] : not present [Abdomen Tenderness] : ~T ~M No abdominal tenderness [de-identified] : NAD [de-identified] : soft, obese [FreeTextEntry1] : Absent pedal pulses bilaterally. Monophasic signals present in DP/PT on continuous wave doppler [de-identified] : CARL FLETCHER [de-identified] : no gross focal motor or sensory deficits [de-identified] : LLE with ulceration with pale pink/yellow granulation bed. Localized to mid anterolateral shin (over lateral aspect of tibia). measures 2.2 by 3.5cm with some evidence of contraction along peripheral edges. Ulcer itself is tender to palpation but no surrounding tenderness. Mild surrounding redness/hyperemia in skin without tenderness. No smell or discharge.

## 2019-06-17 NOTE — HISTORY OF PRESENT ILLNESS
[FreeTextEntry1] : 59 year old female referred for nonhealing LLE ulcer and evaluation for PAD. She has an ulcer on her L, over her anterolateral mid shin, which she thinks started as a spider bite, back in 2017. Wound progressively increased in size and has not healed since. Goes to wound care. She states that she saw another vascular surgeon 1 year ago and had a "shunt" put in the leg (possible angioplasty/stenting?), which didn't really help. Here now for evaluation. She denies any hx of DM. Hx is significant for active smoking. Currently 0.5ppd, but trying to cut back.\par She also follows with Dr. Mcdonnell of ID and has associated osteomyelitis of wound and received a 6 week course of antibiotics, with reported improvement. Now on amoxicillin as ordered by her wound care doctor, whom she sees weekly. Has follow up with ID tomorrow

## 2019-06-18 ENCOUNTER — APPOINTMENT (OUTPATIENT)
Dept: INTERNAL MEDICINE | Facility: CLINIC | Age: 59
End: 2019-06-18
Payer: MEDICAID

## 2019-06-18 VITALS
RESPIRATION RATE: 16 BRPM | HEART RATE: 86 BPM | OXYGEN SATURATION: 96 % | DIASTOLIC BLOOD PRESSURE: 86 MMHG | SYSTOLIC BLOOD PRESSURE: 130 MMHG | BODY MASS INDEX: 36.96 KG/M2 | HEIGHT: 66 IN | WEIGHT: 230 LBS

## 2019-06-18 DIAGNOSIS — A49.8 OTHER BACTERIAL INFECTIONS OF UNSPECIFIED SITE: ICD-10-CM

## 2019-06-18 DIAGNOSIS — M86.10 OTHER ACUTE OSTEOMYELITIS, UNSPECIFIED SITE: ICD-10-CM

## 2019-06-18 PROCEDURE — 99213 OFFICE O/P EST LOW 20 MIN: CPT

## 2019-06-24 ENCOUNTER — OUTPATIENT (OUTPATIENT)
Dept: OUTPATIENT SERVICES | Facility: HOSPITAL | Age: 59
LOS: 1 days | End: 2019-06-24
Payer: MEDICAID

## 2019-06-24 VITALS — HEIGHT: 66 IN | WEIGHT: 227.08 LBS

## 2019-06-24 VITALS
TEMPERATURE: 98 F | RESPIRATION RATE: 18 BRPM | OXYGEN SATURATION: 96 % | DIASTOLIC BLOOD PRESSURE: 97 MMHG | HEIGHT: 66 IN | HEART RATE: 98 BPM | WEIGHT: 229.06 LBS | SYSTOLIC BLOOD PRESSURE: 175 MMHG

## 2019-06-24 DIAGNOSIS — L97.929 NON-PRESSURE CHRONIC ULCER OF UNSPECIFIED PART OF LEFT LOWER LEG WITH UNSPECIFIED SEVERITY: ICD-10-CM

## 2019-06-24 DIAGNOSIS — Z96.641 PRESENCE OF RIGHT ARTIFICIAL HIP JOINT: Chronic | ICD-10-CM

## 2019-06-24 DIAGNOSIS — Z95.828 PRESENCE OF OTHER VASCULAR IMPLANTS AND GRAFTS: Chronic | ICD-10-CM

## 2019-06-24 DIAGNOSIS — Z01.810 ENCOUNTER FOR PREPROCEDURAL CARDIOVASCULAR EXAMINATION: ICD-10-CM

## 2019-06-24 LAB
ALLERGY+IMMUNOLOGY DIAG STUDY NOTE: SIGNIFICANT CHANGE UP
ANION GAP SERPL CALC-SCNC: 11 MMOL/L — SIGNIFICANT CHANGE UP (ref 5–17)
APTT BLD: 30.8 SEC — SIGNIFICANT CHANGE UP (ref 27.5–36.3)
BLD GP AB SCN SERPL QL: ABNORMAL
BUN SERPL-MCNC: 10 MG/DL — SIGNIFICANT CHANGE UP (ref 8–20)
CALCIUM SERPL-MCNC: 9.4 MG/DL — SIGNIFICANT CHANGE UP (ref 8.6–10.2)
CHLORIDE SERPL-SCNC: 103 MMOL/L — SIGNIFICANT CHANGE UP (ref 98–107)
CO2 SERPL-SCNC: 25 MMOL/L — SIGNIFICANT CHANGE UP (ref 22–29)
CREAT SERPL-MCNC: 0.71 MG/DL — SIGNIFICANT CHANGE UP (ref 0.5–1.3)
DIR ANTIGLOB POLYSPECIFIC INTERPRETATION: SIGNIFICANT CHANGE UP
GLUCOSE SERPL-MCNC: 91 MG/DL — SIGNIFICANT CHANGE UP (ref 70–115)
HCT VFR BLD CALC: 42.1 % — SIGNIFICANT CHANGE UP (ref 37–47)
HGB BLD-MCNC: 14.1 G/DL — SIGNIFICANT CHANGE UP (ref 12–16)
INR BLD: 0.89 RATIO — SIGNIFICANT CHANGE UP (ref 0.88–1.16)
MAGNESIUM SERPL-MCNC: 1.7 MG/DL — LOW (ref 1.8–2.6)
MCHC RBC-ENTMCNC: 29.1 PG — SIGNIFICANT CHANGE UP (ref 27–31)
MCHC RBC-ENTMCNC: 33.5 G/DL — SIGNIFICANT CHANGE UP (ref 32–36)
MCV RBC AUTO: 87 FL — SIGNIFICANT CHANGE UP (ref 81–99)
PLATELET # BLD AUTO: 226 K/UL — SIGNIFICANT CHANGE UP (ref 150–400)
POTASSIUM SERPL-MCNC: 4.6 MMOL/L — SIGNIFICANT CHANGE UP (ref 3.5–5.3)
POTASSIUM SERPL-SCNC: 4.6 MMOL/L — SIGNIFICANT CHANGE UP (ref 3.5–5.3)
PROTHROM AB SERPL-ACNC: 10.2 SEC — SIGNIFICANT CHANGE UP (ref 10–12.9)
RBC # BLD: 4.84 M/UL — SIGNIFICANT CHANGE UP (ref 4.4–5.2)
RBC # FLD: 14 % — SIGNIFICANT CHANGE UP (ref 11–15.6)
SODIUM SERPL-SCNC: 139 MMOL/L — SIGNIFICANT CHANGE UP (ref 135–145)
TYPE + AB SCN PNL BLD: SIGNIFICANT CHANGE UP
WBC # BLD: 5.5 K/UL — SIGNIFICANT CHANGE UP (ref 4.8–10.8)
WBC # FLD AUTO: 5.5 K/UL — SIGNIFICANT CHANGE UP (ref 4.8–10.8)

## 2019-06-24 PROCEDURE — 93010 ELECTROCARDIOGRAM REPORT: CPT

## 2019-06-24 RX ORDER — CLOPIDOGREL BISULFATE 75 MG/1
1 TABLET, FILM COATED ORAL
Qty: 0 | Refills: 0 | DISCHARGE

## 2019-06-24 RX ORDER — MAGNESIUM OXIDE 400 MG ORAL TABLET 241.3 MG
1 TABLET ORAL
Qty: 7 | Refills: 0
Start: 2019-06-24 | End: 2019-06-30

## 2019-06-24 NOTE — ASU PATIENT PROFILE, ADULT - PMH
Cellulitis    HLD (hyperlipidemia)    Osteomyelitis    PAD (peripheral artery disease)    Smoker    Wound  non healing wound  left outer ankle, dr garrett  wd care md  as of 6-24-19  is using po augmentin  , wd treatment  wash with soap and water, place marissa  patach moistened, cover with dsd   daily

## 2019-06-24 NOTE — H&P PST ADULT - NSICDXPASTMEDICALHX_GEN_ALL_CORE_FT
PAST MEDICAL HISTORY:  Allergic rhinitis, seasonal     Cellulitis LLE    HLD (hyperlipidemia)     Osteomyelitis LLE leg    PAD (peripheral artery disease) CAR[place] 0.54 LLE 2019    Pseudomonas infection LLE    Smoker active    Wound non healing wound  left outer ankle, dr garrett  wd care md  as of 6-24-19  is using po augmentin  , wd treatment  wash with soap and water, place marissa  patach moistened, cover with dsd   daily

## 2019-06-24 NOTE — H&P PST ADULT - REASON FOR ADMISSION
Aortogram and left lower extremity angiogram (PPA) with possible PPI for slow healing LLE anterior leg ulcer and [place] of LLE of 0.54 RLE is 0.89

## 2019-06-24 NOTE — H&P PST ADULT - NSANTHOSAYNRD_GEN_A_CORE
No. SUZY screening performed.  STOP BANG Legend: 0-2 = LOW Risk; 3-4 = INTERMEDIATE Risk; 5-8 = HIGH Risk

## 2019-06-24 NOTE — H&P PST ADULT - NSICDXFAMILYHX_GEN_ALL_CORE_FT
FAMILY HISTORY:  Father  Still living? Unknown  FH: diabetes mellitus, Age at diagnosis: Age Unknown    Mother  Still living? Unknown  FH: CVA (cerebrovascular accident), Age at diagnosis: Age Unknown

## 2019-06-24 NOTE — H&P PST ADULT - ASSESSMENT
59 obese white female active smoker presents to PST to undergo Aortogram and left lower extremity angiogram (PPA) with possible PPI for slow healing LLE anterior leg ulcer and [place] of LLE of 0.54 RLE is 0.89.  June 2019duplex noted absence of both color and pulsed doppler signals in the superficial femoral artery stents. Low flow velocities noted distal to occlusion    All questions addressed and answered  Would like Dr Bang updated day of procedure  Escort for discharge directions to pt  Mg+ 1.7 repleted with Rx sent to pharmacy and will recheck day of procedure

## 2019-06-24 NOTE — H&P PST ADULT - RS GEN PE MLT RESP DETAILS PC
normal/no rales/no intercostal retractions/no rhonchi/breath sounds equal/respirations non-labored/no wheezes/clear to auscultation bilaterally/good air movement/airway patent

## 2019-06-24 NOTE — H&P PST ADULT - NEGATIVE NEUROLOGICAL SYMPTOMS
no headache/no facial palsy/no difficulty walking/no weakness/no generalized seizures/no tremors/no loss of sensation/no confusion/no focal seizures/no transient paralysis/no paresthesias/no syncope/no loss of consciousness/no hemiparesis/no vertigo

## 2019-06-24 NOTE — H&P PST ADULT - NEUROLOGICAL DETAILS
responds to verbal commands/responds to pain/alert and oriented x 3/sensation intact/deep reflexes intact/cranial nerves intact

## 2019-06-24 NOTE — H&P PST ADULT - HISTORY OF PRESENT ILLNESS
59 obese white female presents to PST to undergo Aortogram and left lower extremity angiogram (PPA) with possible PPI for slow healing LLE anterior leg ulcer and CAROLINA of LLE of 0.54 RLE is 0.89 59 obese white female activie smoker presents to PST to undergo Aortogram and left lower extremity angiogram (PPA) with possible PPI for slow healing LLE anterior leg ulcer and CAROLINA of LLE of 0.54 RLE is 0.89.  June 2019duplex noted absence of both color and pulsed doppler signals in the superficial femoral artery stents. Low flow velocities noted distal to occlusion    States daily compliance with aspirn 81 and statin therapy.  Lowered her number of cigarettes smoked a day.  PMH: LLE anterior tibial slow healing ulcer from 2018 with osteomyelotis    ASA 2  Mallampati 2  GFR 93  BR not indicated for PPA

## 2019-06-24 NOTE — H&P PST ADULT - NSICDXPASTSURGICALHX_GEN_ALL_CORE_FT
PAST SURGICAL HISTORY:  History of hip replacement, total, right     S/P femoral-femoral bypass surgery with stents    Status post PICC central line placement RUE now removed Used for antibiotics IV

## 2019-06-27 ENCOUNTER — TRANSCRIPTION ENCOUNTER (OUTPATIENT)
Age: 59
End: 2019-06-27

## 2019-06-27 ENCOUNTER — OUTPATIENT (OUTPATIENT)
Dept: OUTPATIENT SERVICES | Facility: HOSPITAL | Age: 59
LOS: 1 days | Discharge: HOME | End: 2019-06-27
Payer: MEDICAID

## 2019-06-27 VITALS
HEART RATE: 71 BPM | DIASTOLIC BLOOD PRESSURE: 77 MMHG | SYSTOLIC BLOOD PRESSURE: 146 MMHG | RESPIRATION RATE: 16 BRPM | OXYGEN SATURATION: 98 %

## 2019-06-27 VITALS
HEART RATE: 76 BPM | SYSTOLIC BLOOD PRESSURE: 134 MMHG | TEMPERATURE: 98 F | OXYGEN SATURATION: 94 % | RESPIRATION RATE: 16 BRPM | DIASTOLIC BLOOD PRESSURE: 78 MMHG

## 2019-06-27 DIAGNOSIS — I73.9 PERIPHERAL VASCULAR DISEASE, UNSPECIFIED: ICD-10-CM

## 2019-06-27 DIAGNOSIS — Z95.828 PRESENCE OF OTHER VASCULAR IMPLANTS AND GRAFTS: Chronic | ICD-10-CM

## 2019-06-27 DIAGNOSIS — L97.929 NON-PRESSURE CHRONIC ULCER OF UNSPECIFIED PART OF LEFT LOWER LEG WITH UNSPECIFIED SEVERITY: ICD-10-CM

## 2019-06-27 DIAGNOSIS — Z96.641 PRESENCE OF RIGHT ARTIFICIAL HIP JOINT: Chronic | ICD-10-CM

## 2019-06-27 PROBLEM — L03.90 CELLULITIS, UNSPECIFIED: Chronic | Status: ACTIVE | Noted: 2019-06-24

## 2019-06-27 PROBLEM — F17.200 NICOTINE DEPENDENCE, UNSPECIFIED, UNCOMPLICATED: Chronic | Status: ACTIVE | Noted: 2019-06-24

## 2019-06-27 PROBLEM — T14.90XA INJURY, UNSPECIFIED, INITIAL ENCOUNTER: Chronic | Status: ACTIVE | Noted: 2019-06-24

## 2019-06-27 PROBLEM — J30.2 OTHER SEASONAL ALLERGIC RHINITIS: Chronic | Status: ACTIVE | Noted: 2019-06-24

## 2019-06-27 PROBLEM — A49.8 OTHER BACTERIAL INFECTIONS OF UNSPECIFIED SITE: Chronic | Status: ACTIVE | Noted: 2019-06-24

## 2019-06-27 PROBLEM — M86.9 OSTEOMYELITIS, UNSPECIFIED: Chronic | Status: ACTIVE | Noted: 2019-06-24

## 2019-06-27 LAB — ABO RH CONFIRMATION: SIGNIFICANT CHANGE UP

## 2019-06-27 PROCEDURE — 85730 THROMBOPLASTIN TIME PARTIAL: CPT

## 2019-06-27 PROCEDURE — 36415 COLL VENOUS BLD VENIPUNCTURE: CPT

## 2019-06-27 PROCEDURE — 76937 US GUIDE VASCULAR ACCESS: CPT | Mod: 26

## 2019-06-27 PROCEDURE — 86922 COMPATIBILITY TEST ANTIGLOB: CPT

## 2019-06-27 PROCEDURE — 75716 ARTERY X-RAYS ARMS/LEGS: CPT

## 2019-06-27 PROCEDURE — 80048 BASIC METABOLIC PNL TOTAL CA: CPT

## 2019-06-27 PROCEDURE — C1887: CPT

## 2019-06-27 PROCEDURE — G0463: CPT

## 2019-06-27 PROCEDURE — 93005 ELECTROCARDIOGRAM TRACING: CPT

## 2019-06-27 PROCEDURE — 86905 BLOOD TYPING RBC ANTIGENS: CPT

## 2019-06-27 PROCEDURE — 99153 MOD SED SAME PHYS/QHP EA: CPT

## 2019-06-27 PROCEDURE — 83735 ASSAY OF MAGNESIUM: CPT

## 2019-06-27 PROCEDURE — 99152 MOD SED SAME PHYS/QHP 5/>YRS: CPT

## 2019-06-27 PROCEDURE — 86900 BLOOD TYPING SEROLOGIC ABO: CPT

## 2019-06-27 PROCEDURE — C1894: CPT

## 2019-06-27 PROCEDURE — 86901 BLOOD TYPING SEROLOGIC RH(D): CPT

## 2019-06-27 PROCEDURE — 86880 COOMBS TEST DIRECT: CPT

## 2019-06-27 PROCEDURE — 85027 COMPLETE CBC AUTOMATED: CPT

## 2019-06-27 PROCEDURE — C1769: CPT

## 2019-06-27 PROCEDURE — 36246 INS CATH ABD/L-EXT ART 2ND: CPT

## 2019-06-27 PROCEDURE — 86850 RBC ANTIBODY SCREEN: CPT

## 2019-06-27 PROCEDURE — 85610 PROTHROMBIN TIME: CPT

## 2019-06-27 PROCEDURE — 75710 ARTERY X-RAYS ARM/LEG: CPT | Mod: 26

## 2019-06-27 PROCEDURE — 75625 CONTRAST EXAM ABDOMINL AORTA: CPT | Mod: 26

## 2019-06-27 PROCEDURE — 86870 RBC ANTIBODY IDENTIFICATION: CPT

## 2019-06-27 PROCEDURE — C1760: CPT

## 2019-06-27 RX ORDER — SODIUM CHLORIDE 9 MG/ML
1000 INJECTION INTRAMUSCULAR; INTRAVENOUS; SUBCUTANEOUS
Refills: 0 | Status: DISCONTINUED | OUTPATIENT
Start: 2019-06-27 | End: 2019-07-12

## 2019-06-27 RX ADMIN — SODIUM CHLORIDE 30 MILLILITER(S): 9 INJECTION INTRAMUSCULAR; INTRAVENOUS; SUBCUTANEOUS at 07:50

## 2019-06-27 NOTE — BRIEF OPERATIVE NOTE - NSICDXBRIEFPROCEDURE_GEN_ALL_CORE_FT
PROCEDURES:  Angiogram, lower extremity, in cardiac catheterization lab 27-Jun-2019 09:30:26  Tom Paz  Abdominal aortogram 27-Jun-2019 09:29:52  Tom Paz

## 2019-06-27 NOTE — DISCHARGE NOTE NURSING/CASE MANAGEMENT/SOCIAL WORK - NSDCDPATPORTLINK_GEN_ALL_CORE
You can access the [facility] Patient Portal, offered by [facility], by registering with the following website: [email or web address]

## 2019-06-27 NOTE — DISCHARGE NOTE PROVIDER - NSDCCPTREATMENT_GEN_ALL_CORE_FT
PRINCIPAL PROCEDURE  Procedure: Fluoroscopic angiography of peripheral arteries with intra-arterial contrast  Findings and Treatment:

## 2019-06-27 NOTE — BRIEF OPERATIVE NOTE - OPERATION/FINDINGS
abdominal aortic aneurysm, LLE SFA with extensive metal stent occlusion, Profunda patent with good collaterals, reconstitution at popiteal artery, AT, with good flow, distal PD visualized, PT not seen.

## 2019-06-27 NOTE — DISCHARGE NOTE PROVIDER - CARE PROVIDER_API CALL
Sahil Burton)  Vascular Surgery  [address], [place] [address]  Phone: [phone]  Fax: [phone]  Follow Up Time:

## 2019-06-27 NOTE — DISCHARGE NOTE PROVIDER - NSDCACTIVITY_GEN_ALL_CORE
Stairs allowed/Showering allowed/Do not make important decisions/Do not drive or operate machinery/Driving allowed/Walking - Indoors allowed/Sex allowed/Walking - Outdoors allowed/Return to Work/School allowed

## 2019-06-27 NOTE — DISCHARGE NOTE PROVIDER - NSDCCPCAREPLAN_GEN_ALL_CORE_FT
PRINCIPAL DISCHARGE DIAGNOSIS  Diagnosis: Peripheral vascular disease  Assessment and Plan of Treatment:

## 2019-06-27 NOTE — DISCHARGE NOTE PROVIDER - HOSPITAL COURSE
59 obese white female activie smoker presents to PST to undergo Aortogram and left lower extremity angiogram (PPA) with possible PPI for slow healing LLE anterior leg ulcer and [place] of LLE of 0.54 RLE is 0.89.    June 2019duplex noted absence of both color and pulsed doppler signals in the superficial femoral artery stents. Low flow velocities noted distal to occlusion        States daily compliance with aspirin 81 and statin therapy.  Lowered her number of cigarettes smoked a day.  PMH: LLE anterior tibial slow healing ulcer from 2018 with osteomyelotis.

## 2019-07-01 ENCOUNTER — OUTPATIENT (OUTPATIENT)
Dept: OUTPATIENT SERVICES | Facility: HOSPITAL | Age: 59
LOS: 1 days | End: 2019-07-01
Payer: MEDICAID

## 2019-07-01 ENCOUNTER — APPOINTMENT (OUTPATIENT)
Dept: VASCULAR SURGERY | Facility: CLINIC | Age: 59
End: 2019-07-01
Payer: MEDICAID

## 2019-07-01 VITALS
BODY MASS INDEX: 36.16 KG/M2 | DIASTOLIC BLOOD PRESSURE: 83 MMHG | WEIGHT: 225 LBS | SYSTOLIC BLOOD PRESSURE: 159 MMHG | OXYGEN SATURATION: 95 % | TEMPERATURE: 98.6 F | HEART RATE: 93 BPM | HEIGHT: 66 IN

## 2019-07-01 DIAGNOSIS — Z95.828 PRESENCE OF OTHER VASCULAR IMPLANTS AND GRAFTS: Chronic | ICD-10-CM

## 2019-07-01 DIAGNOSIS — Z96.641 PRESENCE OF RIGHT ARTIFICIAL HIP JOINT: Chronic | ICD-10-CM

## 2019-07-01 PROCEDURE — 93971 EXTREMITY STUDY: CPT

## 2019-07-01 PROCEDURE — G9001: CPT

## 2019-07-01 PROCEDURE — 99214 OFFICE O/P EST MOD 30 MIN: CPT

## 2019-07-01 NOTE — PHYSICAL EXAM
[Respiratory Effort] : normal respiratory effort [Normal Rate and Rhythm] : normal rate and rhythm [2+] : left 2+ [0] : left 0 [Ankle Swelling (On Exam)] : present [Ankle Swelling On The Left] : of the left ankle [Ankle Swelling On The Right] : mild [Alert] : alert [Oriented to Person] : oriented to person [Oriented to Place] : oriented to place [Oriented to Time] : oriented to time [Calm] : calm [JVD] : no jugular venous distention  [Carotid Bruits] : no carotid bruits [Varicose Veins Of Lower Extremities] : not present [] : not present [Abdomen Tenderness] : ~T ~M No abdominal tenderness [de-identified] : NAD [de-identified] : CARL FLETCHER [FreeTextEntry1] : Absent pedal pulses bilaterally. Monophasic signals present in DP/PT on continuous wave doppler [de-identified] : soft, obese [de-identified] : LLE with ulceration with pale pink/yellow granulation bed. Localized to mid anterolateral shin (over lateral aspect of tibia). measures 2.2 by 3.5cm with some evidence of contraction along peripheral edges. Ulcer itself is tender to palpation but no surrounding tenderness. Mild surrounding redness/hyperemia in skin without tenderness. No smell or discharge.  [de-identified] : no gross focal motor or sensory deficits

## 2019-07-01 NOTE — HISTORY OF PRESENT ILLNESS
[FreeTextEntry1] : 59 year old female referred for nonhealing LLE ulcer and evaluation for PAD. She has an ulcer on her L, over her anterolateral mid shin, which she thinks started as a spider bite, back in 2017. Wound progressively increased in size and has not healed since. Goes to wound care. She states that she saw another vascular surgeon 1 year ago and had a "shunt" put in the leg (possible angioplasty/stenting?), which didn't really help. Here now for evaluation. She denies any hx of DM. Hx is significant for active smoking. Currently 0.5ppd, but trying to cut back.\par She also follows with Dr. Mcdonnell of ID and has associated osteomyelitis of wound and received a 6 week course of antibiotics, with reported improvement. Now on amoxicillin as ordered by her wound care doctor, whom she sees weekly. Has follow up with ID tomorrow [de-identified] : Last seen in the office 2 weeks ago in June 2019. In interval, she went for a diagnostic LLE angiogram on 6/27/19. Now POD #4. Found to have long segment occlusion of L SFA with occluded multiple SFA stents and reconstitution of above knee popliteal artery. Best option felt to be bypass and endovascular recannulization not attempted. Since procedure last week, she's doing well. Has no complaints. R groin unremarkable. L leg wound stable. Here to discuss surgery.

## 2019-07-02 ENCOUNTER — NON-APPOINTMENT (OUTPATIENT)
Age: 59
End: 2019-07-02

## 2019-07-02 ENCOUNTER — APPOINTMENT (OUTPATIENT)
Dept: CARDIOLOGY | Facility: CLINIC | Age: 59
End: 2019-07-02
Payer: MEDICAID

## 2019-07-02 VITALS — DIASTOLIC BLOOD PRESSURE: 86 MMHG | SYSTOLIC BLOOD PRESSURE: 146 MMHG

## 2019-07-02 VITALS
DIASTOLIC BLOOD PRESSURE: 80 MMHG | HEIGHT: 66 IN | OXYGEN SATURATION: 98 % | SYSTOLIC BLOOD PRESSURE: 140 MMHG | RESPIRATION RATE: 16 BRPM | BODY MASS INDEX: 36.32 KG/M2 | HEART RATE: 80 BPM | WEIGHT: 226 LBS

## 2019-07-02 DIAGNOSIS — Z78.9 OTHER SPECIFIED HEALTH STATUS: ICD-10-CM

## 2019-07-02 DIAGNOSIS — Z01.810 ENCOUNTER FOR PREPROCEDURAL CARDIOVASCULAR EXAMINATION: ICD-10-CM

## 2019-07-02 DIAGNOSIS — Z87.898 PERSONAL HISTORY OF OTHER SPECIFIED CONDITIONS: ICD-10-CM

## 2019-07-02 PROCEDURE — 99204 OFFICE O/P NEW MOD 45 MIN: CPT

## 2019-07-02 PROCEDURE — 93000 ELECTROCARDIOGRAM COMPLETE: CPT

## 2019-07-02 RX ORDER — FEXOFENADINE HYDROCHLORIDE 180 MG/1
180 TABLET, FILM COATED ORAL DAILY
Qty: 30 | Refills: 1 | Status: COMPLETED | COMMUNITY
Start: 2017-08-22 | End: 2019-07-02

## 2019-07-02 RX ORDER — CIPROFLOXACIN HYDROCHLORIDE 500 MG/1
500 TABLET, FILM COATED ORAL
Refills: 0 | Status: COMPLETED | COMMUNITY
End: 2019-07-02

## 2019-07-02 RX ORDER — OXYCODONE AND ACETAMINOPHEN 5; 325 MG/1; MG/1
5-325 TABLET ORAL
Qty: 60 | Refills: 0 | Status: ACTIVE | COMMUNITY

## 2019-07-02 RX ORDER — ATORVASTATIN CALCIUM 20 MG/1
20 TABLET, FILM COATED ORAL
Qty: 1 | Refills: 1 | Status: ACTIVE | COMMUNITY

## 2019-07-02 RX ORDER — CETIRIZINE HYDROCHLORIDE 10 MG/1
10 TABLET, FILM COATED ORAL DAILY
Qty: 30 | Refills: 0 | Status: COMPLETED | COMMUNITY
Start: 2017-09-06 | End: 2019-07-02

## 2019-07-02 NOTE — REVIEW OF SYSTEMS
[Lower Ext Edema] : lower extremity edema [Joint Stiffness] : joint stiffness [Joint Pain] : joint pain [Leg Claudication] : intermittent leg claudication [Tingling (Paresthesia)] : tingling [Skin Lesions] : skin lesion(s): [Negative] : Endocrine

## 2019-07-09 ENCOUNTER — OUTPATIENT (OUTPATIENT)
Dept: OUTPATIENT SERVICES | Facility: HOSPITAL | Age: 59
LOS: 1 days | End: 2019-07-09
Payer: MEDICAID

## 2019-07-09 VITALS
TEMPERATURE: 98 F | HEART RATE: 83 BPM | DIASTOLIC BLOOD PRESSURE: 80 MMHG | WEIGHT: 227.52 LBS | HEIGHT: 66 IN | RESPIRATION RATE: 20 BRPM | SYSTOLIC BLOOD PRESSURE: 130 MMHG

## 2019-07-09 DIAGNOSIS — Z95.828 PRESENCE OF OTHER VASCULAR IMPLANTS AND GRAFTS: Chronic | ICD-10-CM

## 2019-07-09 DIAGNOSIS — I73.9 PERIPHERAL VASCULAR DISEASE, UNSPECIFIED: ICD-10-CM

## 2019-07-09 DIAGNOSIS — Z01.818 ENCOUNTER FOR OTHER PREPROCEDURAL EXAMINATION: ICD-10-CM

## 2019-07-09 DIAGNOSIS — Z96.641 PRESENCE OF RIGHT ARTIFICIAL HIP JOINT: Chronic | ICD-10-CM

## 2019-07-09 LAB
ANION GAP SERPL CALC-SCNC: 14 MMOL/L — SIGNIFICANT CHANGE UP (ref 5–17)
BUN SERPL-MCNC: 14 MG/DL — SIGNIFICANT CHANGE UP (ref 8–20)
CALCIUM SERPL-MCNC: 10 MG/DL — SIGNIFICANT CHANGE UP (ref 8.6–10.2)
CHLORIDE SERPL-SCNC: 103 MMOL/L — SIGNIFICANT CHANGE UP (ref 98–107)
CO2 SERPL-SCNC: 24 MMOL/L — SIGNIFICANT CHANGE UP (ref 22–29)
CREAT SERPL-MCNC: 0.7 MG/DL — SIGNIFICANT CHANGE UP (ref 0.5–1.3)
DIR ANTIGLOB POLYSPECIFIC INTERPRETATION: SIGNIFICANT CHANGE UP
GLUCOSE SERPL-MCNC: 93 MG/DL — SIGNIFICANT CHANGE UP (ref 70–115)
HCT VFR BLD CALC: 42.8 % — SIGNIFICANT CHANGE UP (ref 34.5–45)
HGB BLD-MCNC: 14.2 G/DL — SIGNIFICANT CHANGE UP (ref 11.5–15.5)
MCHC RBC-ENTMCNC: 29.1 PG — SIGNIFICANT CHANGE UP (ref 27–34)
MCHC RBC-ENTMCNC: 33.2 GM/DL — SIGNIFICANT CHANGE UP (ref 32–36)
MCV RBC AUTO: 87.7 FL — SIGNIFICANT CHANGE UP (ref 80–100)
PLATELET # BLD AUTO: 271 K/UL — SIGNIFICANT CHANGE UP (ref 150–400)
POTASSIUM SERPL-MCNC: 4.2 MMOL/L — SIGNIFICANT CHANGE UP (ref 3.5–5.3)
POTASSIUM SERPL-SCNC: 4.2 MMOL/L — SIGNIFICANT CHANGE UP (ref 3.5–5.3)
RBC # BLD: 4.88 M/UL — SIGNIFICANT CHANGE UP (ref 3.8–5.2)
RBC # FLD: 13.1 % — SIGNIFICANT CHANGE UP (ref 10.3–14.5)
SODIUM SERPL-SCNC: 141 MMOL/L — SIGNIFICANT CHANGE UP (ref 135–145)
TYPE + AB SCN PNL BLD: SIGNIFICANT CHANGE UP
WBC # BLD: 6.94 K/UL — SIGNIFICANT CHANGE UP (ref 3.8–10.5)
WBC # FLD AUTO: 6.94 K/UL — SIGNIFICANT CHANGE UP (ref 3.8–10.5)

## 2019-07-09 PROCEDURE — 93010 ELECTROCARDIOGRAM REPORT: CPT

## 2019-07-09 NOTE — PATIENT PROFILE ADULT - NSPROEDALEARNPREF_GEN_A_NUR
Pre op  teaching  pain management instructions given to pt Surgical scrub deferred due to open wound left shin/individual instruction/written material/verbal instruction

## 2019-07-09 NOTE — H&P PST ADULT - ASSESSMENT
59 year old female presents with PVD and non-healing LLE ulcer scheduled for a fem-pop bypass on 19.  CAPRINI VTE 2.0 SCORE [CLOT updated 2019]    AGE RELATED RISK FACTORS                                                       MOBILITY RELATED FACTORS  [x ] Age 41-60 years                                            (1 Point)                    [ ] Bed rest                                                        (1 Point)  [ ] Age: 61-74 years                                           (2 Points)                  [ ] Plaster cast                                                   (2 Points)  [ ] Age= 75 years                                              (3 Points)                    [ ] Bed bound for more than 72 hours                 (2 Points)    DISEASE RELATED RISK FACTORS                                               GENDER SPECIFIC FACTORS  [ ] Edema in the lower extremities                       (1 Point)              [ ] Pregnancy                                                     (1 Point)  [ ] Varicose veins                                               (1 Point)                     [ ] Post-partum < 6 weeks                                   (1 Point)             [x ] BMI > 25 Kg/m2                                            (1 Point)                     [ ] Hormonal therapy  or oral contraception          (1 Point)                 [ ] Sepsis (in the previous month)                        (1 Point)               [ ] History of pregnancy complications                 (1 point)  [ ] Pneumonia or serious lung disease                                               [ ] Unexplained or recurrent                     (1 Point)           (in the previous month)                               (1 Point)  [ ] Abnormal pulmonary function test                     (1 Point)                 SURGERY RELATED RISK FACTORS  [ ] Acute myocardial infarction                              (1 Point)               [ ]  Section                                             (1 Point)  [ ] Congestive heart failure (in the previous month)  (1 Point)      [x ] Minor surgery                                                  (1 Point)   [ ] Inflammatory bowel disease                             (1 Point)               [ ] Arthroscopic surgery                                        (2 Points)  [ ] Central venous access                                      (2 Points)                [ ] General surgery lasting more than 45 minutes (2 points)  [ ] Malignancy- Present or previous                   (2 Points)                [ ] Elective arthroplasty                                         (5 points)    [ ] Stroke (in the previous month)                          (5 Points)                                                                                                                                                           HEMATOLOGY RELATED FACTORS                                                 TRAUMA RELATED RISK FACTORS  [ ] Prior episodes of VTE                                     (3 Points)                [ ] Fracture of the hip, pelvis, or leg                       (5 Points)  [ ] Positive family history for VTE                         (3 Points)             [ ] Acute spinal cord injury (in the previous month)  (5 Points)  [ ] Prothrombin 22658 A                                     (3 Points)               [ ] Paralysis  (less than 1 month)                             (5 Points)  [ ] Factor V Leiden                                             (3 Points)                  [ ] Multiple Trauma within 1 month                        (5 Points)  [ ] Lupus anticoagulants                                     (3 Points)                                                           [ ] Anticardiolipin antibodies                               (3 Points)                                                       [ ] High homocysteine in the blood                      (3 Points)                                             [ ] Other congenital or acquired thrombophilia      (3 Points)                                                [ ] Heparin induced thrombocytopenia                  (3 Points)                                     Total Score [    3      ]  OPIOID RISK TOOL    HEAVEN EACH BOX THAT APPLIES AND ADD TOTALS AT THE END    FAMILY HISTORY OF SUBSTANCE ABUSE                 FEMALE         MALE                                                Alcohol                             [  ]1 pt          [  ]3pts                                               Illegal Drugs                     [  ]2 pts        [  ]3pts                                               Rx Drugs                           [  ]4 pts        [  ]4 pts    PERSONAL HISTORY OF SUBSTANCE ABUSE                                                                                          Alcohol                             [  ]3 pts       [  ]3 pts                                               Illegal Drugs                     [  ]4 pts        [  ]4 pts                                               Rx Drugs                           [  ]5 pts        [  ]5 pts    AGE BETWEEN 16-45 YEARS                                      [  ]1 pt         [  ]1 pt    HISTORY OF PREADOLESCENT   SEXUAL ABUSE                                                             [  ]3 pts        [  ]0pts    PSYCHOLOGICAL DISEASE                     ADD, OCD, Bipolar, Schizophrenia        [  ]2 pts         [  ]2 pts                      Depression                                               [  ]1 pt           [  ]1 pt           SCORING TOTAL   (add numbers and type here)              (0)                                     A score of 3 or lower indicated LOW risk for future opioid abuse  A score of 4 to 7 indicated moderate risk for future opioid abuse  A score of 8 or higher indicates a high risk for opioid abuse

## 2019-07-09 NOTE — H&P PST ADULT - ATTENDING COMMENTS
Has nonhealing LLE leg wound x2 years. Prior osteomyelitis. With PAD w long segment SFA occlusion  For fem pop bypass

## 2019-07-09 NOTE — H&P PST ADULT - NSICDXPASTMEDICALHX_GEN_ALL_CORE_FT
PAST MEDICAL HISTORY:  Allergic rhinitis, seasonal     Cellulitis LLE    HLD (hyperlipidemia)     Osteomyelitis LLE leg    PAD (peripheral artery disease) CAROLINA 0.54 LLE 2019    Pseudomonas infection LLE    PVD (peripheral vascular disease)     Smoker active    Wound non healing wound  left outer ankle, dr garrett  wd care md  as of 6-24-19  is using po augmentin  , wd treatment  wash with soap and water, place marissa  patach moistened, cover with dsd   daily

## 2019-07-09 NOTE — H&P PST ADULT - HISTORY OF PRESENT ILLNESS
59 year old obese white female with a PMH of dyslipidemia presents with PVD and poor wound healing of LLE wound scheduled for a femoral-popliteal bypass on 7/17/19.

## 2019-07-09 NOTE — H&P PST ADULT - NSICDXPROBLEM_GEN_ALL_CORE_FT
PROBLEM DIAGNOSES  Problem: PVD (peripheral vascular disease)  Assessment and Plan: Labs pending  T&S ordered for day of procedure  Medical clearance scheduled-will request a copy.

## 2019-07-10 PROBLEM — I73.9 PERIPHERAL VASCULAR DISEASE, UNSPECIFIED: Chronic | Status: ACTIVE | Noted: 2019-07-09

## 2019-07-10 PROCEDURE — 87086 URINE CULTURE/COLONY COUNT: CPT

## 2019-07-10 PROCEDURE — 83605 ASSAY OF LACTIC ACID: CPT

## 2019-07-10 PROCEDURE — 87633 RESP VIRUS 12-25 TARGETS: CPT

## 2019-07-10 PROCEDURE — 99285 EMERGENCY DEPT VISIT HI MDM: CPT | Mod: 25

## 2019-07-10 PROCEDURE — 73610 X-RAY EXAM OF ANKLE: CPT

## 2019-07-10 PROCEDURE — 82947 ASSAY GLUCOSE BLOOD QUANT: CPT

## 2019-07-10 PROCEDURE — 85610 PROTHROMBIN TIME: CPT

## 2019-07-10 PROCEDURE — 85027 COMPLETE CBC AUTOMATED: CPT

## 2019-07-10 PROCEDURE — 84295 ASSAY OF SERUM SODIUM: CPT

## 2019-07-10 PROCEDURE — 87581 M.PNEUMON DNA AMP PROBE: CPT

## 2019-07-10 PROCEDURE — 82330 ASSAY OF CALCIUM: CPT

## 2019-07-10 PROCEDURE — 80048 BASIC METABOLIC PNL TOTAL CA: CPT

## 2019-07-10 PROCEDURE — 71046 X-RAY EXAM CHEST 2 VIEWS: CPT

## 2019-07-10 PROCEDURE — 85014 HEMATOCRIT: CPT

## 2019-07-10 PROCEDURE — 87798 DETECT AGENT NOS DNA AMP: CPT

## 2019-07-10 PROCEDURE — 96375 TX/PRO/DX INJ NEW DRUG ADDON: CPT

## 2019-07-10 PROCEDURE — 96374 THER/PROPH/DIAG INJ IV PUSH: CPT

## 2019-07-10 PROCEDURE — 82435 ASSAY OF BLOOD CHLORIDE: CPT

## 2019-07-10 PROCEDURE — 96361 HYDRATE IV INFUSION ADD-ON: CPT

## 2019-07-10 PROCEDURE — 84132 ASSAY OF SERUM POTASSIUM: CPT

## 2019-07-10 PROCEDURE — 85730 THROMBOPLASTIN TIME PARTIAL: CPT

## 2019-07-10 PROCEDURE — 36415 COLL VENOUS BLD VENIPUNCTURE: CPT

## 2019-07-10 PROCEDURE — 87486 CHLMYD PNEUM DNA AMP PROBE: CPT

## 2019-07-10 PROCEDURE — 82803 BLOOD GASES ANY COMBINATION: CPT

## 2019-07-10 PROCEDURE — 93005 ELECTROCARDIOGRAM TRACING: CPT

## 2019-07-10 PROCEDURE — 81003 URINALYSIS AUTO W/O SCOPE: CPT

## 2019-07-10 PROCEDURE — 87040 BLOOD CULTURE FOR BACTERIA: CPT

## 2019-07-10 PROCEDURE — 80053 COMPREHEN METABOLIC PANEL: CPT

## 2019-07-12 ENCOUNTER — OUTPATIENT (OUTPATIENT)
Dept: OUTPATIENT SERVICES | Facility: HOSPITAL | Age: 59
LOS: 1 days | End: 2019-07-12
Payer: MEDICAID

## 2019-07-12 DIAGNOSIS — Z95.828 PRESENCE OF OTHER VASCULAR IMPLANTS AND GRAFTS: Chronic | ICD-10-CM

## 2019-07-12 DIAGNOSIS — Z96.641 PRESENCE OF RIGHT ARTIFICIAL HIP JOINT: Chronic | ICD-10-CM

## 2019-07-12 DIAGNOSIS — E78.5 HYPERLIPIDEMIA, UNSPECIFIED: ICD-10-CM

## 2019-07-12 DIAGNOSIS — Z01.810 ENCOUNTER FOR PREPROCEDURAL CARDIOVASCULAR EXAMINATION: ICD-10-CM

## 2019-07-12 DIAGNOSIS — Z01.818 ENCOUNTER FOR OTHER PREPROCEDURAL EXAMINATION: ICD-10-CM

## 2019-07-12 LAB
BLD GP AB SCN SERPL QL: SIGNIFICANT CHANGE UP
DIR ANTIGLOB POLYSPECIFIC INTERPRETATION: SIGNIFICANT CHANGE UP

## 2019-07-12 PROCEDURE — 93005 ELECTROCARDIOGRAM TRACING: CPT

## 2019-07-12 PROCEDURE — 36415 COLL VENOUS BLD VENIPUNCTURE: CPT

## 2019-07-12 PROCEDURE — A9500: CPT

## 2019-07-12 PROCEDURE — 80048 BASIC METABOLIC PNL TOTAL CA: CPT

## 2019-07-12 PROCEDURE — G0463: CPT

## 2019-07-12 PROCEDURE — 93016 CV STRESS TEST SUPVJ ONLY: CPT

## 2019-07-12 PROCEDURE — 85027 COMPLETE CBC AUTOMATED: CPT

## 2019-07-12 PROCEDURE — 93018 CV STRESS TEST I&R ONLY: CPT

## 2019-07-12 PROCEDURE — 86900 BLOOD TYPING SEROLOGIC ABO: CPT

## 2019-07-12 PROCEDURE — 78452 HT MUSCLE IMAGE SPECT MULT: CPT | Mod: 26

## 2019-07-12 PROCEDURE — 86901 BLOOD TYPING SEROLOGIC RH(D): CPT

## 2019-07-12 PROCEDURE — 86850 RBC ANTIBODY SCREEN: CPT

## 2019-07-12 PROCEDURE — 86880 COOMBS TEST DIRECT: CPT

## 2019-07-12 PROCEDURE — 78452 HT MUSCLE IMAGE SPECT MULT: CPT

## 2019-07-12 PROCEDURE — 93017 CV STRESS TEST TRACING ONLY: CPT

## 2019-07-16 ENCOUNTER — OTHER (OUTPATIENT)
Age: 59
End: 2019-07-16

## 2019-07-16 NOTE — HISTORY OF PRESENT ILLNESS
[Preoperative Visit] : for a medical evaluation prior to surgery [Scheduled Procedure ___] : a [unfilled] [Date of Surgery ___] : on [unfilled] [Surgeon Name ___] : surgeon: [unfilled] [Stable] : Stable [FreeTextEntry1] : 60 y/o F smoker, hyperlipidemia, \par No chest pain or shortness of breath\par only able to walk 1 block due to claudication/paresthesias\par no palpitations, dizziness, syncope\par occasional left leg swelling\par no PND or orthopnea

## 2019-07-16 NOTE — PHYSICAL EXAM
[General Appearance - Well Developed] : well developed [Normal Appearance] : normal appearance [General Appearance - Well Nourished] : well nourished [Well Groomed] : well groomed [General Appearance - In No Acute Distress] : no acute distress [No Deformities] : no deformities [Normal Conjunctiva] : the conjunctiva exhibited no abnormalities [Eyelids - No Xanthelasma] : the eyelids demonstrated no xanthelasmas [Normal Oral Mucosa] : normal oral mucosa [No Oral Pallor] : no oral pallor [No Oral Cyanosis] : no oral cyanosis [Normal Jugular Venous V Waves Present] : normal jugular venous V waves present [Respiration, Rhythm And Depth] : normal respiratory rhythm and effort [Exaggerated Use Of Accessory Muscles For Inspiration] : no accessory muscle use [Auscultation Breath Sounds / Voice Sounds] : lungs were clear to auscultation bilaterally [Heart Rate And Rhythm] : heart rate and rhythm were normal [Heart Sounds] : normal S1 and S2 [Abdomen Soft] : soft [Abdomen Tenderness] : non-tender [Skin Color & Pigmentation] : normal skin color and pigmentation [] : no rash [No Venous Stasis] : no venous stasis [No Xanthoma] : no  xanthoma was observed [Veins - Varicosity Changes] : no varicosital changes were noted in the lower extremities [Bowel Sounds] : normal bowel sounds [Nail Clubbing] : no clubbing of the fingernails [Cyanosis, Localized] : no localized cyanosis [Petechial Hemorrhages (___cm)] : no petechial hemorrhages [Affect] : the affect was normal [Oriented To Time, Place, And Person] : oriented to person, place, and time [No Anxiety] : not feeling anxious [Mood] : the mood was normal [FreeTextEntry1] : left leg ulcer

## 2019-07-16 NOTE — ADDENDUM
[FreeTextEntry1] : 7/16/19\par Nuclear stress testing performed on 7/12/19 demonstrated no evidence for inducible ischemia.  LVEF normal.  Discussed with PCP - Dr. Barnhart. She did not have an echo previously as she told me.  Since LVEF is normal and no s/s of severe valvular disease, ok to proceed without screening echo. Overall low risk patient for high risk, vascular surgery.  No further diagnostic cardiac testing warranted prior to surgery. \par -DM\par

## 2019-07-16 NOTE — ASSESSMENT
[FreeTextEntry1] : # perioperative risk stratification - at least intermediate cardiac risk for vascular surgery.  In setting of limited functional capacity, abnormal ECG, will send for additional risk stratification.  Exercise + pharmacological (walking bekah) nuclear stress testing. She reports having an echo at her PCP's in the past 2 months, will obtain those results\par If no significant ischemia on stress testing, may proceed with vascular surgery as planned. \par # hyperlipidemia - on statin therapy\par # smoking- counseled on cessation, on chantix\par # elevated BP in the office- will reassess at time of stress time, suspect has underlying essential hypertension, don't think this is all due to white coat hypertension. Post operatively, would consider ambulatory BP monitor.  Smoking cessation. \par \par Thank you for allowing me to participate in your patient's care.  Please contact me if there are any questions or concerns.\par \par

## 2019-07-18 ENCOUNTER — TRANSCRIPTION ENCOUNTER (OUTPATIENT)
Age: 59
End: 2019-07-18

## 2019-07-19 ENCOUNTER — INPATIENT (INPATIENT)
Facility: HOSPITAL | Age: 59
LOS: 0 days | Discharge: HOME | DRG: 253 | End: 2019-07-20
Attending: SURGERY | Admitting: SURGERY
Payer: MEDICAID

## 2019-07-19 VITALS
HEIGHT: 66 IN | RESPIRATION RATE: 16 BRPM | HEART RATE: 79 BPM | OXYGEN SATURATION: 98 % | WEIGHT: 229.94 LBS | TEMPERATURE: 98 F | DIASTOLIC BLOOD PRESSURE: 74 MMHG | SYSTOLIC BLOOD PRESSURE: 145 MMHG

## 2019-07-19 DIAGNOSIS — I73.9 PERIPHERAL VASCULAR DISEASE, UNSPECIFIED: ICD-10-CM

## 2019-07-19 DIAGNOSIS — Z95.828 PRESENCE OF OTHER VASCULAR IMPLANTS AND GRAFTS: Chronic | ICD-10-CM

## 2019-07-19 DIAGNOSIS — Z96.641 PRESENCE OF RIGHT ARTIFICIAL HIP JOINT: Chronic | ICD-10-CM

## 2019-07-19 PROCEDURE — 35656 BPG FEMORAL-POPLITEAL: CPT | Mod: 80,LT

## 2019-07-19 PROCEDURE — 35656 BPG FEMORAL-POPLITEAL: CPT | Mod: LT

## 2019-07-19 PROCEDURE — 86077 PHYS BLOOD BANK SERV XMATCH: CPT

## 2019-07-19 RX ORDER — SENNA PLUS 8.6 MG/1
2 TABLET ORAL AT BEDTIME
Refills: 0 | Status: DISCONTINUED | OUTPATIENT
Start: 2019-07-19 | End: 2019-07-20

## 2019-07-19 RX ORDER — ACETAMINOPHEN 500 MG
650 TABLET ORAL EVERY 6 HOURS
Refills: 0 | Status: DISCONTINUED | OUTPATIENT
Start: 2019-07-19 | End: 2019-07-20

## 2019-07-19 RX ORDER — ATORVASTATIN CALCIUM 80 MG/1
20 TABLET, FILM COATED ORAL AT BEDTIME
Refills: 0 | Status: DISCONTINUED | OUTPATIENT
Start: 2019-07-19 | End: 2019-07-20

## 2019-07-19 RX ORDER — ONDANSETRON 8 MG/1
4 TABLET, FILM COATED ORAL ONCE
Refills: 0 | Status: DISCONTINUED | OUTPATIENT
Start: 2019-07-19 | End: 2019-07-19

## 2019-07-19 RX ORDER — CEFAZOLIN SODIUM 1 G
2000 VIAL (EA) INJECTION ONCE
Refills: 0 | Status: COMPLETED | OUTPATIENT
Start: 2019-07-19 | End: 2019-07-19

## 2019-07-19 RX ORDER — ASPIRIN/CALCIUM CARB/MAGNESIUM 324 MG
81 TABLET ORAL DAILY
Refills: 0 | Status: DISCONTINUED | OUTPATIENT
Start: 2019-07-19 | End: 2019-07-20

## 2019-07-19 RX ORDER — OXYCODONE HYDROCHLORIDE 5 MG/1
10 TABLET ORAL EVERY 4 HOURS
Refills: 0 | Status: DISCONTINUED | OUTPATIENT
Start: 2019-07-19 | End: 2019-07-20

## 2019-07-19 RX ORDER — HYDROMORPHONE HYDROCHLORIDE 2 MG/ML
0.5 INJECTION INTRAMUSCULAR; INTRAVENOUS; SUBCUTANEOUS
Refills: 0 | Status: DISCONTINUED | OUTPATIENT
Start: 2019-07-19 | End: 2019-07-19

## 2019-07-19 RX ORDER — SODIUM CHLORIDE 9 MG/ML
3 INJECTION INTRAMUSCULAR; INTRAVENOUS; SUBCUTANEOUS EVERY 8 HOURS
Refills: 0 | Status: DISCONTINUED | OUTPATIENT
Start: 2019-07-19 | End: 2019-07-19

## 2019-07-19 RX ORDER — SODIUM CHLORIDE 9 MG/ML
1000 INJECTION, SOLUTION INTRAVENOUS
Refills: 0 | Status: DISCONTINUED | OUTPATIENT
Start: 2019-07-19 | End: 2019-07-19

## 2019-07-19 RX ORDER — FENTANYL CITRATE 50 UG/ML
50 INJECTION INTRAVENOUS
Refills: 0 | Status: DISCONTINUED | OUTPATIENT
Start: 2019-07-19 | End: 2019-07-19

## 2019-07-19 RX ORDER — OXYCODONE HYDROCHLORIDE 5 MG/1
5 TABLET ORAL EVERY 4 HOURS
Refills: 0 | Status: DISCONTINUED | OUTPATIENT
Start: 2019-07-19 | End: 2019-07-20

## 2019-07-19 RX ORDER — HEPARIN SODIUM 5000 [USP'U]/ML
5000 INJECTION INTRAVENOUS; SUBCUTANEOUS EVERY 8 HOURS
Refills: 0 | Status: DISCONTINUED | OUTPATIENT
Start: 2019-07-20 | End: 2019-07-20

## 2019-07-19 RX ORDER — DOCUSATE SODIUM 100 MG
100 CAPSULE ORAL
Refills: 0 | Status: DISCONTINUED | OUTPATIENT
Start: 2019-07-19 | End: 2019-07-20

## 2019-07-19 RX ADMIN — Medication 650 MILLIGRAM(S): at 12:43

## 2019-07-19 RX ADMIN — FENTANYL CITRATE 50 MICROGRAM(S): 50 INJECTION INTRAVENOUS at 12:16

## 2019-07-19 RX ADMIN — Medication 100 MILLIGRAM(S): at 17:25

## 2019-07-19 RX ADMIN — Medication 650 MILLIGRAM(S): at 17:24

## 2019-07-19 RX ADMIN — Medication 81 MILLIGRAM(S): at 12:43

## 2019-07-19 RX ADMIN — FENTANYL CITRATE 50 MICROGRAM(S): 50 INJECTION INTRAVENOUS at 11:45

## 2019-07-19 RX ADMIN — ATORVASTATIN CALCIUM 20 MILLIGRAM(S): 80 TABLET, FILM COATED ORAL at 21:03

## 2019-07-19 RX ADMIN — Medication 650 MILLIGRAM(S): at 18:21

## 2019-07-19 RX ADMIN — Medication 100 MILLIGRAM(S): at 08:45

## 2019-07-19 RX ADMIN — OXYCODONE HYDROCHLORIDE 10 MILLIGRAM(S): 5 TABLET ORAL at 18:34

## 2019-07-19 RX ADMIN — FENTANYL CITRATE 50 MICROGRAM(S): 50 INJECTION INTRAVENOUS at 12:42

## 2019-07-19 RX ADMIN — OXYCODONE HYDROCHLORIDE 10 MILLIGRAM(S): 5 TABLET ORAL at 18:52

## 2019-07-19 RX ADMIN — Medication 650 MILLIGRAM(S): at 23:32

## 2019-07-19 NOTE — CHART NOTE - NSCHARTNOTEFT_GEN_A_CORE
post op check s/p LLE SFA to AK pop bypass w/ PTFE    AVSS  Comfortable in bed  denies pain  LLE ace wrap CDI  LLE 1+ DP <2sec cap refill   sensation motor intact   thigh soft dressing CDI  groin prevena in place

## 2019-07-20 ENCOUNTER — TRANSCRIPTION ENCOUNTER (OUTPATIENT)
Age: 59
End: 2019-07-20

## 2019-07-20 VITALS
TEMPERATURE: 98 F | SYSTOLIC BLOOD PRESSURE: 102 MMHG | OXYGEN SATURATION: 95 % | HEART RATE: 68 BPM | DIASTOLIC BLOOD PRESSURE: 76 MMHG | RESPIRATION RATE: 18 BRPM

## 2019-07-20 LAB
ANION GAP SERPL CALC-SCNC: 12 MMOL/L — SIGNIFICANT CHANGE UP (ref 5–17)
BUN SERPL-MCNC: 12 MG/DL — SIGNIFICANT CHANGE UP (ref 8–20)
CALCIUM SERPL-MCNC: 9.2 MG/DL — SIGNIFICANT CHANGE UP (ref 8.6–10.2)
CHLORIDE SERPL-SCNC: 102 MMOL/L — SIGNIFICANT CHANGE UP (ref 98–107)
CO2 SERPL-SCNC: 25 MMOL/L — SIGNIFICANT CHANGE UP (ref 22–29)
CREAT SERPL-MCNC: 0.66 MG/DL — SIGNIFICANT CHANGE UP (ref 0.5–1.3)
GLUCOSE SERPL-MCNC: 135 MG/DL — HIGH (ref 70–115)
HCT VFR BLD CALC: 39.4 % — SIGNIFICANT CHANGE UP (ref 34.5–45)
HGB BLD-MCNC: 12.7 G/DL — SIGNIFICANT CHANGE UP (ref 11.5–15.5)
MAGNESIUM SERPL-MCNC: 1.7 MG/DL — SIGNIFICANT CHANGE UP (ref 1.6–2.6)
MCHC RBC-ENTMCNC: 28.6 PG — SIGNIFICANT CHANGE UP (ref 27–34)
MCHC RBC-ENTMCNC: 32.2 GM/DL — SIGNIFICANT CHANGE UP (ref 32–36)
MCV RBC AUTO: 88.7 FL — SIGNIFICANT CHANGE UP (ref 80–100)
PLATELET # BLD AUTO: 204 K/UL — SIGNIFICANT CHANGE UP (ref 150–400)
POTASSIUM SERPL-MCNC: 4.3 MMOL/L — SIGNIFICANT CHANGE UP (ref 3.5–5.3)
POTASSIUM SERPL-SCNC: 4.3 MMOL/L — SIGNIFICANT CHANGE UP (ref 3.5–5.3)
RBC # BLD: 4.44 M/UL — SIGNIFICANT CHANGE UP (ref 3.8–5.2)
RBC # FLD: 13.2 % — SIGNIFICANT CHANGE UP (ref 10.3–14.5)
SODIUM SERPL-SCNC: 139 MMOL/L — SIGNIFICANT CHANGE UP (ref 135–145)
WBC # BLD: 11.65 K/UL — HIGH (ref 3.8–10.5)
WBC # FLD AUTO: 11.65 K/UL — HIGH (ref 3.8–10.5)

## 2019-07-20 PROCEDURE — 80048 BASIC METABOLIC PNL TOTAL CA: CPT

## 2019-07-20 PROCEDURE — C1889: CPT

## 2019-07-20 PROCEDURE — 36415 COLL VENOUS BLD VENIPUNCTURE: CPT

## 2019-07-20 PROCEDURE — 99222 1ST HOSP IP/OBS MODERATE 55: CPT

## 2019-07-20 PROCEDURE — 86901 BLOOD TYPING SEROLOGIC RH(D): CPT

## 2019-07-20 PROCEDURE — 83735 ASSAY OF MAGNESIUM: CPT

## 2019-07-20 PROCEDURE — 86850 RBC ANTIBODY SCREEN: CPT

## 2019-07-20 PROCEDURE — 86870 RBC ANTIBODY IDENTIFICATION: CPT

## 2019-07-20 PROCEDURE — C1768: CPT

## 2019-07-20 PROCEDURE — 86880 COOMBS TEST DIRECT: CPT

## 2019-07-20 PROCEDURE — 85027 COMPLETE CBC AUTOMATED: CPT

## 2019-07-20 PROCEDURE — 86900 BLOOD TYPING SEROLOGIC ABO: CPT

## 2019-07-20 RX ORDER — OXYCODONE HYDROCHLORIDE 5 MG/1
1 TABLET ORAL
Qty: 20 | Refills: 0
Start: 2019-07-20

## 2019-07-20 RX ADMIN — Medication 650 MILLIGRAM(S): at 13:30

## 2019-07-20 RX ADMIN — Medication 81 MILLIGRAM(S): at 12:22

## 2019-07-20 RX ADMIN — Medication 650 MILLIGRAM(S): at 05:31

## 2019-07-20 RX ADMIN — Medication 650 MILLIGRAM(S): at 05:57

## 2019-07-20 RX ADMIN — Medication 100 MILLIGRAM(S): at 05:31

## 2019-07-20 RX ADMIN — Medication 650 MILLIGRAM(S): at 12:21

## 2019-07-20 RX ADMIN — HEPARIN SODIUM 5000 UNIT(S): 5000 INJECTION INTRAVENOUS; SUBCUTANEOUS at 05:31

## 2019-07-20 RX ADMIN — Medication 650 MILLIGRAM(S): at 00:00

## 2019-07-20 NOTE — CONSULT NOTE ADULT - SUBJECTIVE AND OBJECTIVE BOX
[facility]  INFECTIOUS DISEASES AND INTERNAL MEDICINE at [place]  =======================================================  Diaz Wakefield MD  Diplomates American Board of Internal Medicine and Infectious Diseases  Telephone [phone]  Fax            [phone]  =======================================================    [facility]-[id number]  KARLA TOTH   HPI:  59 year old obese white female with a PMH of dyslipidemia, PAD,  active smoker up until 1 week ago,  presents with PVD and poor wound healing of LLE wound scheduled for a femoral-popliteal bypass.  she underwent on 7/19  Femoral-popliteal bypass graft with non-vein.  She is known to our office and follows with Dr. Mcdonnell.  last seen on 4/2/19    From December 2018 through April 2019, patient was managed or left lower extremity ulcer and osteomyelitis on bone scan.   Prior output culture with pseudomonas and patient completed a six-week course of meropenem. Per notes in office, the patient advised to quit smoking to promote wound healing.    Prior to the bypass, patient was following with wound care physician and given a course of PO Augmentin.     patient is seen post op.  No fevers noted.     =======================================================  Past Medical & Surgical Hx:  =====================  PAST MEDICAL & SURGICAL HISTORY:  PVD (peripheral vascular disease)  Allergic rhinitis, seasonal  Pseudomonas infection: LLE  Osteomyelitis: LLE leg  Smoker: active  PAD (peripheral artery disease): CAROLINA 0.54 LLE 2019  Cellulitis: LLE  Wound: non healing wound  left outer ankle, dr garrett  wd care md  as of 6-24-19  is using po augmentin  , wd treatment  wash with soap and water, place marissa  patach moistened, cover with dsd   daily  HLD (hyperlipidemia)  Status post PICC central line placement: RUE now removed Used for antibiotics IV  S/P femoral-femoral bypass surgery: with stents  History of hip replacement, total, right    Problem List:  ==========  HEALTH ISSUES - PROBLEM Dx:  PVD (peripheral vascular disease)      Social Hx:  =======  smoked up until 1 week prior to current admission    FAMILY HISTORY:  FH: CVA (cerebrovascular accident) (Mother)  FH: diabetes mellitus (Father)  no significant family history of immunosuppressive disorders in mother or father   =======================================================  REVIEW OF SYSTEMS:  as above  all other ROS negative  =======================================================  Allergies    No Known Allergies    Intolerances    Antibiotics:  none    Other medications:  acetaminophen   Tablet .. 650 milliGRAM(s) Oral every 6 hours  aspirin enteric coated 81 milliGRAM(s) Oral daily  atorvastatin 20 milliGRAM(s) Oral at bedtime  docusate sodium 100 milliGRAM(s) Oral two times a day  heparin  Injectable 5000 Unit(s) SubCutaneous every 8 hours  senna 2 Tablet(s) Oral at bedtime   ceFAZolin   IVPB   100 mL/Hr IV Intermittent (07-19-19 @ 08:45)      ======================================================  Physical Exam:  ============  T(F): 98.1 (20 Jul 2019 08:00), Max: 98.8 (20 Jul 2019 00:25)  HR: 68 (20 Jul 2019 08:00)  BP: 115/70 (20 Jul 2019 08:00)  RR: 17 (20 Jul 2019 08:00)  SpO2: 91% (20 Jul 2019 08:00) (90% - 100%)  temp max in last 48H T(F): , Max: 98.8 (07-20-19 @ 00:25)    General:  No acute distress.  Eye: Pupils are equal, round and reactive to light, Extraocular movements are intact, Normal conjunctiva.  HENT: Normocephalic, Oral mucosa is moist, No pharyngeal erythema, No sinus tenderness.  Neck: Supple, No lymphadenopathy.  Respiratory: Lungs are clear to auscultation, Respirations are non-labored.  Cardiovascular: Normal rate, Regular rhythm,   good pulses in Left lower ext.   Gastrointestinal: Soft, Non-tender, Non-distended, Normal bowel sounds.  Genitourinary: No costovertebral angle tenderness.  Lymphatics: No lymphadenopathy neck,   Musculoskeletal: Normal range of motion, Normal strength.  LEFT MEDIAL THIGH INCISION WITH MICRO VAC DRESSING IN PLACE,.   LEFT MEDIAL LEG INCISION WITH DRESSING IN PLACE.   Integumentary: LEFT LATERAL LEG ULCER, SHALLOW, WITH CLEAN BASE, ABOUT 4 X 4 CM IN SIZE.  NO FOUL DRAINAGE  Neurologic: Alert, Oriented, No focal deficits, Cranial Nerves II-XII are grossly intact.  Psychiatric: Appropriate mood & affect.    =======================================================  Labs:                        12.7   11.65 )-----------( 204      ( 20 Jul 2019 07:11 )             39.4       WBC Count: 11.65 K/uL (07-20-19 @ 07:11)      07-20    139  |  102  |  12.0  ----------------------------<  135<H>  4.3   |  25.0  |  0.66    Ca    9.2      20 Jul 2019 07:11  Mg     1.7     07-20      Creatinine, Serum: 0.66 mg/dL (07-20-19 @ 07:11)

## 2019-07-20 NOTE — CONSULT NOTE ADULT - ASSESSMENT
This 59 year old obese white female with a PMH of dyslipidemia, PAD,  active smoker up until 1 week ago,    here for poor wound healing of LLE wound scheduled for a femoral-popliteal bypass.  she underwent on 7/19  Femoral-popliteal bypass graft with non-vein.  previously treated for left lower extremity ulcer and osteomyelitis on bone scan with 6 weeks of Merrem    s/p Bypass  WBC elevation  chronic left leg ulcer      Leg ulcer not overly infected on exam  - may suggest a short course of Keflex for 5 days post discharge which may help wound healing.   - planned for UNNA boot today by vascular, which will also help with wound healing.   - WBC elevation perioperatively - reactive, will not treat this specifically.   - I do not anticipate long term antibiotics use    no contraindications to discharge to community from ID standpoint    patient to follow in with Dr. Mcdonnell in the office in 2 weeks.

## 2019-07-20 NOTE — DISCHARGE NOTE PROVIDER - HOSPITAL COURSE
59 year old obese white female with a PMH of dyslipidemia presents with PVD and poor wound healing of LLE wound. Taken to the OR on 7/19/19 by Dr Nogueira and underwent S/P  L prox SFA to above knee pop bypass using 6mm propatent graft. Periop course uneventful. Monitored overnight without any  events . POD# 1 mcgrath was dc'd and she was voiding freely. Tolerating regular diet and ambulating independently. She was seen by ID to clarify need for abx-no evidence of infection LLE wound. No need for abx. She had unna boot placed and is stable for DC home today

## 2019-07-20 NOTE — DISCHARGE NOTE PROVIDER - NSDCCPCAREPLAN_GEN_ALL_CORE_FT
PRINCIPAL DISCHARGE DIAGNOSIS  Diagnosis: PVD (peripheral vascular disease)  Assessment and Plan of Treatment:       SECONDARY DISCHARGE DIAGNOSES  Diagnosis: Nonhealing nonsurgical wound  Assessment and Plan of Treatment:

## 2019-07-20 NOTE — DISCHARGE NOTE PROVIDER - NSDCCPTREATMENT_GEN_ALL_CORE_FT
PRINCIPAL PROCEDURE  Procedure: Femoral-popliteal bypass graft with non-vein  Findings and Treatment:       SECONDARY PROCEDURE  Procedure: Application of Unna boot  Findings and Treatment:

## 2019-07-20 NOTE — DISCHARGE NOTE PROVIDER - CARE PROVIDER_API CALL
Sahil Burton)  Vascular Surgery  [address] [facility], [place] [address]  Phone: [phone]  Fax: [phone]  Follow Up Time: 1 week

## 2019-07-20 NOTE — PROGRESS NOTE ADULT - SUBJECTIVE AND OBJECTIVE BOX
Patient is a 59y old  Female who presents with a chief complaint of "I am having a bypass" (09 Jul 2019 10:28)    Pt is S/P  L prox SFA to above knee pop bypass using 6mm propatent graft                                  POD# 1  No events overnight  Pt c/o incisional pain which is well managed with her prescribed medications    Vital Signs Last 24 Hrs  T(C): 36.6 (20 Jul 2019 05:36), Max: 37.1 (20 Jul 2019 00:25)  T(F): 97.9 (20 Jul 2019 05:36), Max: 98.8 (20 Jul 2019 00:25)  HR: 68 (20 Jul 2019 05:36) (61 - 83)  BP: 117/71 (20 Jul 2019 05:36) (116/67 - 173/73)  BP(mean): --  RR: 18 (20 Jul 2019 05:36) (11 - 18)  SpO2: 90% (20 Jul 2019 05:36) (90% - 100%)  I&O's Detail    19 Jul 2019 07:01  -  20 Jul 2019 07:00  --------------------------------------------------------  IN:    lactated ringers.: 50 mL    Oral Fluid: 420 mL  Total IN: 470 mL    OUT:    Indwelling Catheter - Urethral: 875 mL  Total OUT: 875 mL    Total NET: -405 mL    MEDICATIONS  (STANDING):  acetaminophen   Tablet .. 650 milliGRAM(s) Oral every 6 hours  aspirin enteric coated 81 milliGRAM(s) Oral daily  atorvastatin 20 milliGRAM(s) Oral at bedtime  docusate sodium 100 milliGRAM(s) Oral two times a day  heparin  Injectable 5000 Unit(s) SubCutaneous every 8 hours  senna 2 Tablet(s) Oral at bedtime    MEDICATIONS  (PRN):  oxyCODONE    IR 5 milliGRAM(s) Oral every 4 hours PRN Moderate Pain (4 - 6)  oxyCODONE    IR 10 milliGRAM(s) Oral every 4 hours PRN Severe Pain (7 - 10)    PAST MEDICAL & SURGICAL HISTORY:  PVD (peripheral vascular disease)  Allergic rhinitis, seasonal  Pseudomonas infection: LLE  Osteomyelitis: LLE leg  Smoker: active  PAD (peripheral artery disease): CAROLINA 0.54 LLE 2019  Cellulitis: LLE  Wound: non healing wound  left outer ankle, dr garrett  wd care md  as of 6-24-19  is using po augmentin  , wd treatment  wash with soap and water, place marissa  patach moistened, cover with dsd   daily  HLD (hyperlipidemia)  Status post PICC central line placement: RUE now removed Used for antibiotics IV  S/P femoral-femoral bypass surgery: with stents  History of hip replacement, total, right    Physical Exam:  General: NAD, resting comfortably in bed  Pulmonary: Nonlabored breathing,CTA  Cardiovascular: NORMAL s1, s2  Abdominal: soft, NT/ND, + bs, + FLATUS  Extremities: LLE WWP with 1+ pedal edema. L groin Prevena in place with good seal. L thigh dressing CDI. L DP palp, LLE sensory and motor intact        LABS:                        12.7   11.65 )-----------( 204      ( 20 Jul 2019 07:11 )             39.4         Assessment:59yFemaleHPI:  59 year old obese white female with a PMH of dyslipidemia presents with PVD and poor wound healing of LLE wound scheduled for a femoral-popliteal bypass on 7/17/19. (09 Jul 2019 10:28)  S/P  L prox SFA to above knee pop bypass using 6mm propatent graft                                  POD# 1  Doing well  Pt with chronic LLE wound-nonhealing    Plan:  Cont ASA and Statin  OOB/Ambulate/PT  DC mcgrath  Possible unna boot prior to dc for management of wound  On chronic abx as per wound care MD; will reconsult ID for need  Possible dc home later today

## 2019-07-20 NOTE — DISCHARGE NOTE PROVIDER - NSDCFUADDINST_GEN_ALL_CORE_FT
Leave left groin dressing in place until Wednesday 7/24/19 then remove. If uncomfortable about removing on own may present to vascular office and NP will remove. May shower daily keeping left lower boot dry. Wash incision with soap and water and pat dry. Leave open to air. No ointments, powders or creams to incision. Do not shave over incision x 3 weeks or until healed. Monitor incision for any redness, swelling or drainage and call office immediately with any concerns  May remove unna boot to left leg on Sunday 7/28/19 and shower completely. Cover wound with dry gauze and see Dr Nogueira in office on 7/29/19. The office will call you with appointment time. Any questions or concerns please call office.

## 2019-07-20 NOTE — PROGRESS NOTE ADULT - SUBJECTIVE AND OBJECTIVE BOX
Exam:  Extremities: L medial thigh incision with steris intact, mild surrounding ecchymosis no drainage or erythema. L lat calf ulcer 5x5 cm with pink base and hyperkerotoic edges, no malodor, no erythema        Plan:  LLE unna boot  placed. Wrapped with ACE for gentle compression.   Home today

## 2019-07-20 NOTE — DISCHARGE NOTE NURSING/CASE MANAGEMENT/SOCIAL WORK - NSDCDPATPORTLINK_GEN_ALL_CORE
You can access the [facility] Patient Portal, offered by [facility], by registering with the following website: [email or web address]

## 2019-07-20 NOTE — DISCHARGE NOTE PROVIDER - CARE PROVIDERS DIRECT ADDRESSES
[email or web address]

## 2019-07-20 NOTE — DISCHARGE NOTE PROVIDER - NSDCACTIVITY_GEN_ALL_CORE
No heavy lifting/straining/Showering allowed/Bathing allowed/Walking - Outdoors allowed/Stairs allowed/Walking - Indoors allowed

## 2019-07-22 DIAGNOSIS — Z71.89 OTHER SPECIFIED COUNSELING: ICD-10-CM

## 2019-07-29 ENCOUNTER — APPOINTMENT (OUTPATIENT)
Dept: VASCULAR SURGERY | Facility: CLINIC | Age: 59
End: 2019-07-29
Payer: MEDICAID

## 2019-07-29 VITALS
BODY MASS INDEX: 36.16 KG/M2 | HEIGHT: 66 IN | HEART RATE: 103 BPM | TEMPERATURE: 98.6 F | DIASTOLIC BLOOD PRESSURE: 92 MMHG | OXYGEN SATURATION: 93 % | WEIGHT: 225 LBS | SYSTOLIC BLOOD PRESSURE: 151 MMHG

## 2019-07-29 DIAGNOSIS — L97.929 NON-PRESSURE CHRONIC ULCER OF UNSPECIFIED PART OF LEFT LOWER LEG WITH UNSPECIFIED SEVERITY: ICD-10-CM

## 2019-07-29 PROCEDURE — 99024 POSTOP FOLLOW-UP VISIT: CPT

## 2019-07-30 PROBLEM — L97.929: Status: ACTIVE | Noted: 2019-06-17

## 2019-07-30 NOTE — PHYSICAL EXAM
[Respiratory Effort] : normal respiratory effort [Normal Rate and Rhythm] : normal rate and rhythm [0] : right 0 [Ankle Swelling (On Exam)] : present [Ankle Swelling On The Left] : of the left ankle [Ankle Swelling On The Right] : mild [Oriented to Person] : oriented to person [Alert] : alert [Oriented to Time] : oriented to time [Calm] : calm [Oriented to Place] : oriented to place [JVD] : no jugular venous distention  [Carotid Bruits] : no carotid bruits [2+] : left 2+ [Varicose Veins Of Lower Extremities] : not present [de-identified] : NAD [Abdomen Tenderness] : ~T ~M No abdominal tenderness [] : not present [de-identified] : soft, obese [FreeTextEntry1] : L sided pedal pulses now palpable (in contrast to vascular exam pre-bypass) [de-identified] : CARL FLETCHER [de-identified] : no gross focal motor or sensory deficits [de-identified] : LLE with healing longitudinal groin incision and distal thigh medial incision from her recent bypass. Incisions intact without any redness, dehiscence, discharge, or underlying hematoma. Persistent L leg ulceration with pale pink granulation bed. Localized to mid anterolateral shin (over lateral aspect of tibia), with evidence of some epithelialization along edges and contraction. Currently measures 1.9 by 3.3cm. Ulcer itself is tender to palpation but no surrounding tenderness. No smell or discharge.

## 2019-07-30 NOTE — HISTORY OF PRESENT ILLNESS
[de-identified] : Last seen in the office 4 weeks ago in early July 2019. In interval, she went to the OR on 7/19/2019 for a L prox SFA to AK pop bypass with 6mm ringed PTFE. Surgery went well and post operative course unremarkable and pt went home on POD#1. Here now for first post operative visit. Since discharge from the hospital, she's doing well. She denies any pain at surgical incision sites. Denies any redness, discharge. Has diffuse mild swelling in the extremity, which is slowly improving. Re her wound, she had an unnaboot placed upon discharge from the hospital, but was unable to tolerate it and subsequently removed it by POD #3. Now applying marissa topically (as previously prescribed by her wound care doctor) with overlying gauze and kerlix. Overall, she thinks wound is starting to heal. She has signficantly cut back on her smoking and at time of surgery, was thought to have quit, however she admits to slipping up and having an occasional cigarette once in a while. She continues on ASA and statin [FreeTextEntry1] : 59 year old female referred for nonhealing LLE ulcer and evaluation for PAD. She has an ulcer on her L, over her anterolateral mid shin, which she thinks started as a spider bite, back in 2017. Wound progressively increased in size and has not healed since. Goes to wound care. She states that she saw another vascular surgeon 1 year ago and had a "shunt" put in the leg (possible angioplasty/stenting?), which didn't really help. Here now for evaluation. She denies any hx of DM. Hx is significant for active smoking. Currently 0.5ppd, but trying to cut back.\par She also follows with Dr. Mcdonnell of ID and has associated osteomyelitis of wound and received a 6 week course of antibiotics, with reported improvement. Now on amoxicillin as ordered by her wound care doctor, whom she sees weekly. Has follow up with ID tomorrow

## 2019-08-01 ENCOUNTER — NON-APPOINTMENT (OUTPATIENT)
Age: 59
End: 2019-08-01

## 2019-08-01 ENCOUNTER — APPOINTMENT (OUTPATIENT)
Dept: CARDIOLOGY | Facility: CLINIC | Age: 59
End: 2019-08-01
Payer: MEDICAID

## 2019-08-01 VITALS
HEART RATE: 93 BPM | WEIGHT: 230 LBS | HEIGHT: 66 IN | SYSTOLIC BLOOD PRESSURE: 161 MMHG | RESPIRATION RATE: 16 BRPM | BODY MASS INDEX: 36.96 KG/M2 | DIASTOLIC BLOOD PRESSURE: 80 MMHG

## 2019-08-01 DIAGNOSIS — F17.200 NICOTINE DEPENDENCE, UNSPECIFIED, UNCOMPLICATED: ICD-10-CM

## 2019-08-01 DIAGNOSIS — R94.31 ABNORMAL ELECTROCARDIOGRAM [ECG] [EKG]: ICD-10-CM

## 2019-08-01 DIAGNOSIS — E78.5 HYPERLIPIDEMIA, UNSPECIFIED: ICD-10-CM

## 2019-08-01 PROCEDURE — 93000 ELECTROCARDIOGRAM COMPLETE: CPT

## 2019-08-01 PROCEDURE — 99214 OFFICE O/P EST MOD 30 MIN: CPT

## 2019-08-01 NOTE — ASSESSMENT
[FreeTextEntry1] : ECG: SR, borderline septal Q waves\par \par PHARM NUCLEAR STRESS TEST 7/2019 [facility]\par 1. No evidence of ischemia\par 2. EF 73%\par 3. Frequent APC's\par

## 2019-08-01 NOTE — HISTORY OF PRESENT ILLNESS
[FreeTextEntry1] : Patient is a 60yo F with PAD s/p L SFA to AK pop bypass (7/2019), HLD, tobacco dependence here for cardiac evaluation.  HAd stent to left SFA which closed leading to need for surgery.  Had undergone nuclear stress testing prior to surgery which was unremarkable. Denies CP/SOB. Patient denies PND/orthopnea/edema/palpitations/syncope. Had been treated recently for non-healing LE ulcer on the left leg prior to surgery. Left leg feels numb since surgery. Competed 6 week course of antibiotics. Lives alone, currently looking for work but has had significant medical issues recently. \par \par ROS: GI negative, all others negative

## 2019-08-01 NOTE — PHYSICAL EXAM
[General Appearance - Well Developed] : well developed [Normal Conjunctiva] : the conjunctiva exhibited no abnormalities [General Appearance - Well Nourished] : well nourished [Normal Oropharynx] : normal oropharynx [Normal Jugular Venous V Waves Present] : normal jugular venous V waves present [Heart Sounds] : normal S1 and S2 [Heart Rate And Rhythm] : heart rate and rhythm were normal [Respiration, Rhythm And Depth] : normal respiratory rhythm and effort [] : no respiratory distress [Auscultation Breath Sounds / Voice Sounds] : lungs were clear to auscultation bilaterally [Bowel Sounds] : normal bowel sounds [Abdomen Tenderness] : non-tender [Abdomen Soft] : soft [Abnormal Walk] : normal gait [Nail Clubbing] : no clubbing of the fingernails [Skin Color & Pigmentation] : normal skin color and pigmentation [Skin Turgor] : normal skin turgor [Cyanosis, Localized] : no localized cyanosis [Affect] : the affect was normal [Oriented To Time, Place, And Person] : oriented to person, place, and time [FreeTextEntry1] : 1+ edema bilaterally, left greater than right. LLE wound bandaged

## 2019-08-01 NOTE — DISCUSSION/SUMMARY
[FreeTextEntry1] : Patient is a 60yo F with PAD s/p LLE bypass (7/2019), HLD, tobacco dependence here for cardiac evaluation. BP high, ? white coat syndrome. Advised white monitor at home with cuff. If unable and remains high at follow up will start medication. BP in hospital normal and has fluctuated in office visits. Will also recheck lipids. Needs echo to evaluate murmur and mildly abnormal ECG, stress test without ischemia which I reassured patient. \par \par 1. Continue ASA/statin, check lipids\par 2. Counselled for more than 3 minutes on smoking cessation\par 3. Increase physical activity as tolerated \par 4. Recommend aggressive diet and lifestyle modifications \par 5. Vascular follow up\par 6. Echo\par 7. Monitor BP at home, advised she obtain cuff\par 8. Follow up 2 months

## 2019-08-19 NOTE — CHART NOTE - NSCHARTNOTEFT_GEN_A_CORE
The antibody identification demonstrations the presence of Anti- K. The K or Salinas antigen belongs to the Salinas group of red cell antigens. It is considered to be clinically significant and associated with moderate to severe transfusion reactions and HDFN (hemolytic disease of the fetal ) should the patient be challenged with incompatible transfusion or in the case of HDFN, an antigen mismatch between mother and fetus.  In HDFN, The Anti-Salinas antibody can also cause profound anemia in the  secondary to suppression of hematopoiesis. This effect can persist for several weeks following delivery. Blood selected for transfusion of immunized patients should be K antigen negative and cross-match compatible through AHG phase. The Kimmy antigen is only expressed on the red cells of 9% of Caucasians (91% lack the antigen) and on 2% (98% lack the antigen) of red cells of AAs, making Anti-Kimmy one of the most frequently identified antibodies in transfusion Z01.84

## 2019-08-22 ENCOUNTER — APPOINTMENT (OUTPATIENT)
Dept: VASCULAR SURGERY | Facility: CLINIC | Age: 59
End: 2019-08-22

## 2019-08-26 ENCOUNTER — APPOINTMENT (OUTPATIENT)
Dept: CARDIOLOGY | Facility: CLINIC | Age: 59
End: 2019-08-26

## 2019-08-27 ENCOUNTER — APPOINTMENT (OUTPATIENT)
Dept: CARDIOLOGY | Facility: CLINIC | Age: 59
End: 2019-08-27

## 2019-08-27 ENCOUNTER — NON-APPOINTMENT (OUTPATIENT)
Age: 59
End: 2019-08-27

## 2019-08-27 ENCOUNTER — APPOINTMENT (OUTPATIENT)
Dept: VASCULAR SURGERY | Facility: CLINIC | Age: 59
End: 2019-08-27

## 2019-08-27 ENCOUNTER — APPOINTMENT (OUTPATIENT)
Dept: CARDIOLOGY | Facility: CLINIC | Age: 59
End: 2019-08-27
Payer: MEDICAID

## 2019-08-27 VITALS
HEIGHT: 66 IN | HEART RATE: 95 BPM | BODY MASS INDEX: 35.52 KG/M2 | OXYGEN SATURATION: 95 % | DIASTOLIC BLOOD PRESSURE: 82 MMHG | RESPIRATION RATE: 16 BRPM | WEIGHT: 221 LBS | SYSTOLIC BLOOD PRESSURE: 150 MMHG

## 2019-08-27 DIAGNOSIS — I10 ESSENTIAL (PRIMARY) HYPERTENSION: ICD-10-CM

## 2019-08-27 PROCEDURE — 99213 OFFICE O/P EST LOW 20 MIN: CPT

## 2019-08-27 PROCEDURE — 93000 ELECTROCARDIOGRAM COMPLETE: CPT

## 2019-08-27 NOTE — REVIEW OF SYSTEMS
[Lower Ext Edema] : lower extremity edema [Leg Claudication] : intermittent leg claudication [Joint Pain] : joint pain [Joint Stiffness] : joint stiffness [Tingling (Paresthesia)] : tingling [Skin Lesions] : skin lesion(s): [Negative] : Heme/Lymph

## 2019-08-27 NOTE — ASSESSMENT
[FreeTextEntry1] : # hyperlipidemia - on statin therapy\par # smoking- counseled on cessation previously\par # elevated BP in the office- also with some pain from surgical site.  Suspect has underlying essential hypertension, don't think this is all due to white coat hypertension. would consider ambulatory BP monitor.  Smoking cessation. \par # PVD - may have some cellulitis of the thigh; Needs to return to vascular surgeon for wound check.  Was upset when I told her I couldn't refill her percocet and left before finishing the visit.\par # murmur - still needs echo - can be done with our office or at Dr. Nolan's.  No need for 2 cardiologists, she can follow up with either one of us. \par \par Thank you for allowing me to participate in your patient's care.  Please contact me if there are any questions or concerns.\par \par

## 2019-08-27 NOTE — PHYSICAL EXAM
[General Appearance - Well Developed] : well developed [Normal Appearance] : normal appearance [General Appearance - Well Nourished] : well nourished [Well Groomed] : well groomed [General Appearance - In No Acute Distress] : no acute distress [No Deformities] : no deformities [Eyelids - No Xanthelasma] : the eyelids demonstrated no xanthelasmas [No Oral Cyanosis] : no oral cyanosis [Oriented To Time, Place, And Person] : oriented to person, place, and time [FreeTextEntry1] : left leg ulcer; left thigh with surgical wound - mildly erythematous, no discharge

## 2019-08-27 NOTE — HISTORY OF PRESENT ILLNESS
[FreeTextEntry1] : 58 y/o F smoker, with hyperlipidemia s/p left sided CFA to AK pop bypass 7/2019 with Dr. Burton.  She had nuclear stress testing in July which did not demonstrate ischemia or infarct.  LVEF was normal.  She is being treated for RLE osteomyelitis. She reports RLE swelling.  She has not had her echocardiogram. She came for a wound check post bypass and asking for medications.  Advised her that she should be seen by vascular surgery, she became upset and left the office.

## 2019-08-27 NOTE — REASON FOR VISIT
[Follow-Up - Clinic] : a clinic follow-up of [Peripheral Vascular Disease] : peripheral vascular disease

## 2019-08-30 ENCOUNTER — APPOINTMENT (OUTPATIENT)
Dept: VASCULAR SURGERY | Facility: CLINIC | Age: 59
End: 2019-08-30
Payer: MEDICAID

## 2019-08-30 VITALS
DIASTOLIC BLOOD PRESSURE: 84 MMHG | OXYGEN SATURATION: 97 % | BODY MASS INDEX: 35.52 KG/M2 | HEART RATE: 83 BPM | WEIGHT: 221 LBS | HEIGHT: 66 IN | TEMPERATURE: 98.6 F | SYSTOLIC BLOOD PRESSURE: 136 MMHG

## 2019-08-30 DIAGNOSIS — L03.116 CELLULITIS OF LEFT LOWER LIMB: ICD-10-CM

## 2019-08-30 PROCEDURE — 93926 LOWER EXTREMITY STUDY: CPT

## 2019-08-30 PROCEDURE — 99214 OFFICE O/P EST MOD 30 MIN: CPT | Mod: 24

## 2019-08-30 RX ORDER — CEPHALEXIN 500 MG/1
500 CAPSULE ORAL 4 TIMES DAILY
Qty: 28 | Refills: 1 | Status: ACTIVE | COMMUNITY
Start: 2019-08-30 | End: 1900-01-01

## 2019-09-01 ENCOUNTER — TRANSCRIPTION ENCOUNTER (OUTPATIENT)
Age: 59
End: 2019-09-01

## 2019-09-05 ENCOUNTER — INPATIENT (INPATIENT)
Facility: HOSPITAL | Age: 59
LOS: 4 days | Discharge: HOME | DRG: 857 | End: 2019-09-10
Attending: SURGERY | Admitting: SURGERY
Payer: MEDICAID

## 2019-09-05 ENCOUNTER — TRANSCRIPTION ENCOUNTER (OUTPATIENT)
Age: 59
End: 2019-09-05

## 2019-09-05 ENCOUNTER — APPOINTMENT (OUTPATIENT)
Dept: VASCULAR SURGERY | Facility: CLINIC | Age: 59
End: 2019-09-05
Payer: MEDICAID

## 2019-09-05 VITALS
TEMPERATURE: 98 F | RESPIRATION RATE: 18 BRPM | HEART RATE: 75 BPM | OXYGEN SATURATION: 92 % | WEIGHT: 209.88 LBS | DIASTOLIC BLOOD PRESSURE: 73 MMHG | SYSTOLIC BLOOD PRESSURE: 161 MMHG

## 2019-09-05 VITALS
OXYGEN SATURATION: 97 % | BODY MASS INDEX: 34.87 KG/M2 | DIASTOLIC BLOOD PRESSURE: 84 MMHG | HEIGHT: 66 IN | SYSTOLIC BLOOD PRESSURE: 146 MMHG | WEIGHT: 217 LBS | HEART RATE: 85 BPM | TEMPERATURE: 98.6 F

## 2019-09-05 DIAGNOSIS — Z95.828 PRESENCE OF OTHER VASCULAR IMPLANTS AND GRAFTS: Chronic | ICD-10-CM

## 2019-09-05 DIAGNOSIS — L03.90 CELLULITIS, UNSPECIFIED: ICD-10-CM

## 2019-09-05 DIAGNOSIS — Z96.641 PRESENCE OF RIGHT ARTIFICIAL HIP JOINT: Chronic | ICD-10-CM

## 2019-09-05 LAB
ALBUMIN SERPL ELPH-MCNC: 4 G/DL — SIGNIFICANT CHANGE UP (ref 3.3–5.2)
ALLERGY+IMMUNOLOGY DIAG STUDY NOTE: SIGNIFICANT CHANGE UP
ALP SERPL-CCNC: 99 U/L — SIGNIFICANT CHANGE UP (ref 40–120)
ALT FLD-CCNC: 8 U/L — SIGNIFICANT CHANGE UP
ANION GAP SERPL CALC-SCNC: 13 MMOL/L — SIGNIFICANT CHANGE UP (ref 5–17)
ANISOCYTOSIS BLD QL: SLIGHT — SIGNIFICANT CHANGE UP
APTT BLD: 27.4 SEC — LOW (ref 27.5–36.3)
AST SERPL-CCNC: 11 U/L — SIGNIFICANT CHANGE UP
BASOPHILS # BLD AUTO: 0 K/UL — SIGNIFICANT CHANGE UP (ref 0–0.2)
BASOPHILS NFR BLD AUTO: 0 % — SIGNIFICANT CHANGE UP (ref 0–2)
BILIRUB SERPL-MCNC: <0.2 MG/DL — LOW (ref 0.4–2)
BLD GP AB SCN SERPL QL: SIGNIFICANT CHANGE UP
BUN SERPL-MCNC: 9 MG/DL — SIGNIFICANT CHANGE UP (ref 8–20)
CALCIUM SERPL-MCNC: 9.5 MG/DL — SIGNIFICANT CHANGE UP (ref 8.6–10.2)
CHLORIDE SERPL-SCNC: 99 MMOL/L — SIGNIFICANT CHANGE UP (ref 98–107)
CO2 SERPL-SCNC: 25 MMOL/L — SIGNIFICANT CHANGE UP (ref 22–29)
CREAT SERPL-MCNC: 0.66 MG/DL — SIGNIFICANT CHANGE UP (ref 0.5–1.3)
DIR ANTIGLOB POLYSPECIFIC INTERPRETATION: SIGNIFICANT CHANGE UP
ELLIPTOCYTES BLD QL SMEAR: SLIGHT — SIGNIFICANT CHANGE UP
EOSINOPHIL # BLD AUTO: 0.36 K/UL — SIGNIFICANT CHANGE UP (ref 0–0.5)
EOSINOPHIL NFR BLD AUTO: 5.2 % — SIGNIFICANT CHANGE UP (ref 0–6)
GIANT PLATELETS BLD QL SMEAR: PRESENT — SIGNIFICANT CHANGE UP
GLUCOSE SERPL-MCNC: 111 MG/DL — SIGNIFICANT CHANGE UP (ref 70–115)
HCT VFR BLD CALC: 40.8 % — SIGNIFICANT CHANGE UP (ref 34.5–45)
HGB BLD-MCNC: 13.5 G/DL — SIGNIFICANT CHANGE UP (ref 11.5–15.5)
INR BLD: 1.03 RATIO — SIGNIFICANT CHANGE UP (ref 0.88–1.16)
LYMPHOCYTES # BLD AUTO: 1.93 K/UL — SIGNIFICANT CHANGE UP (ref 1–3.3)
LYMPHOCYTES # BLD AUTO: 27.8 % — SIGNIFICANT CHANGE UP (ref 13–44)
MACROCYTES BLD QL: SLIGHT — SIGNIFICANT CHANGE UP
MAGNESIUM SERPL-MCNC: 1.9 MG/DL — SIGNIFICANT CHANGE UP (ref 1.6–2.6)
MANUAL SMEAR VERIFICATION: SIGNIFICANT CHANGE UP
MCHC RBC-ENTMCNC: 28.8 PG — SIGNIFICANT CHANGE UP (ref 27–34)
MCHC RBC-ENTMCNC: 33.1 GM/DL — SIGNIFICANT CHANGE UP (ref 32–36)
MCV RBC AUTO: 87.2 FL — SIGNIFICANT CHANGE UP (ref 80–100)
MICROCYTES BLD QL: SLIGHT — SIGNIFICANT CHANGE UP
MONOCYTES # BLD AUTO: 0.36 K/UL — SIGNIFICANT CHANGE UP (ref 0–0.9)
MONOCYTES NFR BLD AUTO: 5.2 % — SIGNIFICANT CHANGE UP (ref 2–14)
NEUTROPHILS # BLD AUTO: 4.29 K/UL — SIGNIFICANT CHANGE UP (ref 1.8–7.4)
NEUTROPHILS NFR BLD AUTO: 61.8 % — SIGNIFICANT CHANGE UP (ref 43–77)
OVALOCYTES BLD QL SMEAR: SLIGHT — SIGNIFICANT CHANGE UP
PLAT MORPH BLD: NORMAL — SIGNIFICANT CHANGE UP
PLATELET # BLD AUTO: 331 K/UL — SIGNIFICANT CHANGE UP (ref 150–400)
POIKILOCYTOSIS BLD QL AUTO: SLIGHT — SIGNIFICANT CHANGE UP
POTASSIUM SERPL-MCNC: 3.6 MMOL/L — SIGNIFICANT CHANGE UP (ref 3.5–5.3)
POTASSIUM SERPL-SCNC: 3.6 MMOL/L — SIGNIFICANT CHANGE UP (ref 3.5–5.3)
PROT SERPL-MCNC: 7.4 G/DL — SIGNIFICANT CHANGE UP (ref 6.6–8.7)
PROTHROM AB SERPL-ACNC: 11.8 SEC — SIGNIFICANT CHANGE UP (ref 10–12.9)
RBC # BLD: 4.68 M/UL — SIGNIFICANT CHANGE UP (ref 3.8–5.2)
RBC # FLD: 13 % — SIGNIFICANT CHANGE UP (ref 10.3–14.5)
RBC BLD AUTO: ABNORMAL
SODIUM SERPL-SCNC: 137 MMOL/L — SIGNIFICANT CHANGE UP (ref 135–145)
WBC # BLD: 6.94 K/UL — SIGNIFICANT CHANGE UP (ref 3.8–10.5)
WBC # FLD AUTO: 6.94 K/UL — SIGNIFICANT CHANGE UP (ref 3.8–10.5)

## 2019-09-05 PROCEDURE — 93010 ELECTROCARDIOGRAM REPORT: CPT

## 2019-09-05 PROCEDURE — 93970 EXTREMITY STUDY: CPT | Mod: 26

## 2019-09-05 PROCEDURE — 86077 PHYS BLOOD BANK SERV XMATCH: CPT

## 2019-09-05 PROCEDURE — 99024 POSTOP FOLLOW-UP VISIT: CPT

## 2019-09-05 RX ORDER — PIPERACILLIN AND TAZOBACTAM 4; .5 G/20ML; G/20ML
3.38 INJECTION, POWDER, LYOPHILIZED, FOR SOLUTION INTRAVENOUS EVERY 8 HOURS
Refills: 0 | Status: DISCONTINUED | OUTPATIENT
Start: 2019-09-06 | End: 2019-09-08

## 2019-09-05 RX ORDER — SODIUM CHLORIDE 9 MG/ML
1000 INJECTION INTRAMUSCULAR; INTRAVENOUS; SUBCUTANEOUS
Refills: 0 | Status: DISCONTINUED | OUTPATIENT
Start: 2019-09-05 | End: 2019-09-06

## 2019-09-05 RX ORDER — VANCOMYCIN HCL 1 G
VIAL (EA) INTRAVENOUS
Refills: 0 | Status: DISCONTINUED | OUTPATIENT
Start: 2019-09-05 | End: 2019-09-05

## 2019-09-05 RX ORDER — PIPERACILLIN AND TAZOBACTAM 4; .5 G/20ML; G/20ML
3.38 INJECTION, POWDER, LYOPHILIZED, FOR SOLUTION INTRAVENOUS ONCE
Refills: 0 | Status: COMPLETED | OUTPATIENT
Start: 2019-09-05 | End: 2019-09-05

## 2019-09-05 RX ORDER — NICOTINE POLACRILEX 2 MG
1 GUM BUCCAL DAILY
Refills: 0 | Status: DISCONTINUED | OUTPATIENT
Start: 2019-09-05 | End: 2019-09-10

## 2019-09-05 RX ORDER — INFLUENZA VIRUS VACCINE 15; 15; 15; 15 UG/.5ML; UG/.5ML; UG/.5ML; UG/.5ML
0.5 SUSPENSION INTRAMUSCULAR ONCE
Refills: 0 | Status: COMPLETED | OUTPATIENT
Start: 2019-09-05 | End: 2019-09-05

## 2019-09-05 RX ORDER — PIPERACILLIN AND TAZOBACTAM 4; .5 G/20ML; G/20ML
INJECTION, POWDER, LYOPHILIZED, FOR SOLUTION INTRAVENOUS
Refills: 0 | Status: DISCONTINUED | OUTPATIENT
Start: 2019-09-05 | End: 2019-09-08

## 2019-09-05 RX ORDER — SODIUM CHLORIDE 9 MG/ML
3 INJECTION INTRAMUSCULAR; INTRAVENOUS; SUBCUTANEOUS EVERY 8 HOURS
Refills: 0 | Status: DISCONTINUED | OUTPATIENT
Start: 2019-09-05 | End: 2019-09-10

## 2019-09-05 RX ADMIN — PIPERACILLIN AND TAZOBACTAM 25 GRAM(S): 4; .5 INJECTION, POWDER, LYOPHILIZED, FOR SOLUTION INTRAVENOUS at 21:39

## 2019-09-05 RX ADMIN — SODIUM CHLORIDE 3 MILLILITER(S): 9 INJECTION INTRAMUSCULAR; INTRAVENOUS; SUBCUTANEOUS at 21:38

## 2019-09-05 RX ADMIN — Medication 1 PATCH: at 21:39

## 2019-09-05 NOTE — H&P ADULT - HISTORY OF PRESENT ILLNESS
HPI: Betty Sheehan is a 59y F w/ pmhx significant for PVD, dyslipidemia, osteomyelitis LLE, non-healing LLE wound now s/p SFA to pop bypass w/ PTFE graft 7/19 who presents as direct admit for possible wound infection/cellulitis. Patient states she had a bug bite at her left ankle a few months ago that developed into an ulcer that never healed. Vascular surgery assessed the wound and it was determined that she needed surgical intervention to improve LLE blood flow in order to heal the wound, so she received a bypass with PTFE graft 7/19/19. The incisional site had been healing without complication until the recent few days when she noted redness and now new drainage. She has pain localized to the incisional site and describes it as a burning throbbing pain that comes and goes and is worse with walking. She notes the incision is very itchy. Of note, she has history of LE osteomyelitis that was treated with 6w course of ABX. She states that her ankle ulcer has much improved since the bypass operation, but there is still significant pain at the site. She denies f/v, n/v, abdominal pain, headache, abd pain, sob.     PMH: PVD, dyslipidemia, osteomyelitis LLE  PSH: 7/19/19 L proximal SFA to above knee pop bypass w/ PTFE graft  Meds: Aspirin 81 81 MG Oral Tablet Delayed Release; TAKE 1 TABLET DAILY  Atorvastatin Calcium 20 MG Oral Tablet; TAKE 1 TABLET AT BEDTIME  oxyCODONE-Acetaminophen 5-325 MG Oral Tablet; TAKE 1 TABLET EVERY 4 HOURS  AS NEEDED FOR PAIN. MDD:2 tabs  SH: .5ppd smoker    PE:  General: Patient appears comfortable resting in bed  LLE: 10cm incision of medial thigh with 1cm area of skin breakdown and serous drainage. erythema and swelling about 5cm x 14cm surrounding incision. peeling of skin directly over incision. surrounding tissue without crepitus. [id number] ulcer at lateral ankle/shin with appropriate granulation tissue, mild surrounding erythema. ulcer without active drainage. 2+DP, cap refill <3s, distal extremity warm and well perfused  RLE: 2+DP, warm and well perfused

## 2019-09-05 NOTE — PHYSICAL EXAM
[JVD] : no jugular venous distention  [Carotid Bruits] : no carotid bruits [Normal Breath Sounds] : Normal breath sounds [2+] : left 2+ [Ankle Swelling (On Exam)] : not present [1+] : left 1+ [Varicose Veins Of Lower Extremities] : not present [No Rash or Lesion] : No rash or lesion [] : not present [de-identified] : Well appearing [FreeTextEntry1] : Triphasic DP/PT in left leg.\par left medial thigh incision with induration, erythema and serous drainage\par Groin wound well healed

## 2019-09-05 NOTE — ASSESSMENT
[FreeTextEntry1] : 59 year old female with Hx of spider bite in L leg in 2017 that resulted in a non-healing wound. The wound has begun to heal since she had bypass.\par Now with left thigh incision infection\par Will admit to H\par CT left leg\par For possible washout tomorrow\par

## 2019-09-05 NOTE — HISTORY OF PRESENT ILLNESS
[FreeTextEntry1] : 59 year old female referred for nonhealing LLE ulcer and evaluation for PAD. She has an ulcer on her L, over her anterolateral mid shin, which she thinks started as a spider bite, back in 2017. Wound progressively increased in size and has not healed since. Goes to wound care. She states that she saw another vascular surgeon 1 year ago and had a "shunt" put in the leg (possible angioplasty/stenting?), which didn't really help. Here now for evaluation. She denies any Hx of DM. Hx is significant for active smoking. Currently 0.5 PPD, but trying to cut back.\par She also follows with Dr. Mcdonnell of ID and has associated osteomyelitis of wound and received a 6 week course of antibiotics, with reported improvement. Now on amoxicillin as ordered by her wound care doctor, whom she sees weekly. Has follow up with ID tomorrow \par \par  [de-identified] : Interval History: 58 y/o female RTC for followup. S/P L prox SFA to AK pop bypass with 8 MM ringed PTFE 7/19/19 and ulcer to lateral aspect of distal LLE. She had been using Gillian on her wound but ran out of topical application and would like a different cream. The wound has erythema around the incision with a 1 cm x 0.2 mm dehiscence. There is no drainage. Erythema extends medially to posterior thigh. She states there is pain in her thigh and it hurts to change clothes. She continues to smoke, but has significantly cut back. She continues on ASA 81 mg and Atorvastatin. \par Continues to have drainage and redness despite 1 week of PO antibiotics.\par no fever or chills

## 2019-09-05 NOTE — H&P ADULT - ASSESSMENT
A/P: Betty Sheehan is a 59y F with PMH significant for non-healing LLE ulcer, PVD, dyslipidemia, osteomyelitis LLE s/p L PTFE bypass presenting for wound infection.  -blood cultures drawn  -patient to start zosyn and vanco while pending cx results  -preop pt for OR tomorrow for wound washout and debridement  -follow up CT imaging  -F/u vein mapping

## 2019-09-06 PROCEDURE — 73701 CT LOWER EXTREMITY W/DYE: CPT | Mod: 26,LT

## 2019-09-06 PROCEDURE — 27301 DRAIN THIGH/KNEE LESION: CPT | Mod: LT,78

## 2019-09-06 RX ORDER — HYDROMORPHONE HYDROCHLORIDE 2 MG/ML
1 INJECTION INTRAMUSCULAR; INTRAVENOUS; SUBCUTANEOUS
Refills: 0 | Status: DISCONTINUED | OUTPATIENT
Start: 2019-09-06 | End: 2019-09-06

## 2019-09-06 RX ORDER — SODIUM CHLORIDE 9 MG/ML
1000 INJECTION, SOLUTION INTRAVENOUS
Refills: 0 | Status: DISCONTINUED | OUTPATIENT
Start: 2019-09-06 | End: 2019-09-06

## 2019-09-06 RX ORDER — ONDANSETRON 8 MG/1
4 TABLET, FILM COATED ORAL ONCE
Refills: 0 | Status: DISCONTINUED | OUTPATIENT
Start: 2019-09-06 | End: 2019-09-06

## 2019-09-06 RX ORDER — OXYCODONE HYDROCHLORIDE 5 MG/1
5 TABLET ORAL EVERY 4 HOURS
Refills: 0 | Status: DISCONTINUED | OUTPATIENT
Start: 2019-09-06 | End: 2019-09-10

## 2019-09-06 RX ORDER — OXYCODONE HYDROCHLORIDE 5 MG/1
10 TABLET ORAL EVERY 4 HOURS
Refills: 0 | Status: DISCONTINUED | OUTPATIENT
Start: 2019-09-06 | End: 2019-09-10

## 2019-09-06 RX ORDER — HEPARIN SODIUM 5000 [USP'U]/ML
5000 INJECTION INTRAVENOUS; SUBCUTANEOUS EVERY 8 HOURS
Refills: 0 | Status: DISCONTINUED | OUTPATIENT
Start: 2019-09-06 | End: 2019-09-10

## 2019-09-06 RX ORDER — MAGNESIUM SULFATE 500 MG/ML
2 VIAL (ML) INJECTION ONCE
Refills: 0 | Status: DISCONTINUED | OUTPATIENT
Start: 2019-09-06 | End: 2019-09-06

## 2019-09-06 RX ADMIN — PIPERACILLIN AND TAZOBACTAM 25 GRAM(S): 4; .5 INJECTION, POWDER, LYOPHILIZED, FOR SOLUTION INTRAVENOUS at 05:52

## 2019-09-06 RX ADMIN — SODIUM CHLORIDE 75 MILLILITER(S): 9 INJECTION INTRAMUSCULAR; INTRAVENOUS; SUBCUTANEOUS at 08:39

## 2019-09-06 RX ADMIN — SODIUM CHLORIDE 3 MILLILITER(S): 9 INJECTION INTRAMUSCULAR; INTRAVENOUS; SUBCUTANEOUS at 05:48

## 2019-09-06 RX ADMIN — HYDROMORPHONE HYDROCHLORIDE 1 MILLIGRAM(S): 2 INJECTION INTRAMUSCULAR; INTRAVENOUS; SUBCUTANEOUS at 19:39

## 2019-09-06 RX ADMIN — Medication 100 MILLIGRAM(S): at 03:04

## 2019-09-06 RX ADMIN — SODIUM CHLORIDE 75 MILLILITER(S): 9 INJECTION INTRAMUSCULAR; INTRAVENOUS; SUBCUTANEOUS at 01:12

## 2019-09-06 RX ADMIN — OXYCODONE HYDROCHLORIDE 10 MILLIGRAM(S): 5 TABLET ORAL at 20:43

## 2019-09-06 RX ADMIN — HYDROMORPHONE HYDROCHLORIDE 1 MILLIGRAM(S): 2 INJECTION INTRAMUSCULAR; INTRAVENOUS; SUBCUTANEOUS at 19:36

## 2019-09-06 RX ADMIN — PIPERACILLIN AND TAZOBACTAM 25 GRAM(S): 4; .5 INJECTION, POWDER, LYOPHILIZED, FOR SOLUTION INTRAVENOUS at 20:39

## 2019-09-06 RX ADMIN — HYDROMORPHONE HYDROCHLORIDE 1 MILLIGRAM(S): 2 INJECTION INTRAMUSCULAR; INTRAVENOUS; SUBCUTANEOUS at 19:26

## 2019-09-06 RX ADMIN — SODIUM CHLORIDE 3 MILLILITER(S): 9 INJECTION INTRAMUSCULAR; INTRAVENOUS; SUBCUTANEOUS at 21:15

## 2019-09-06 RX ADMIN — SODIUM CHLORIDE 3 MILLILITER(S): 9 INJECTION INTRAMUSCULAR; INTRAVENOUS; SUBCUTANEOUS at 14:47

## 2019-09-06 RX ADMIN — OXYCODONE HYDROCHLORIDE 10 MILLIGRAM(S): 5 TABLET ORAL at 21:13

## 2019-09-06 RX ADMIN — HEPARIN SODIUM 5000 UNIT(S): 5000 INJECTION INTRAVENOUS; SUBCUTANEOUS at 20:43

## 2019-09-06 RX ADMIN — Medication 100 MILLIGRAM(S): at 19:30

## 2019-09-06 RX ADMIN — Medication 1 PATCH: at 00:03

## 2019-09-06 RX ADMIN — Medication 1 PATCH: at 13:09

## 2019-09-06 RX ADMIN — Medication 1 PATCH: at 08:39

## 2019-09-06 RX ADMIN — Medication 1 PATCH: at 21:15

## 2019-09-06 RX ADMIN — Medication 100 MILLIGRAM(S): at 13:08

## 2019-09-06 RX ADMIN — PIPERACILLIN AND TAZOBACTAM 25 GRAM(S): 4; .5 INJECTION, POWDER, LYOPHILIZED, FOR SOLUTION INTRAVENOUS at 13:09

## 2019-09-06 NOTE — CHART NOTE - NSCHARTNOTEFT_GEN_A_CORE
Was notified that patient reported hx of rash with vancomycin.     - discussed plan with senior resident  - started clindamycin

## 2019-09-06 NOTE — PROGRESS NOTE ADULT - SUBJECTIVE AND OBJECTIVE BOX
INTERVAL HPI/OVERNIGHT EVENTS:    SUBJECTIVE: Patient seen at bedside and in no acute distress. She was made NPO overnight in preparation for the OR today. She has concerns regarding the surgery that were thoroughly discussed at bedside and the patient stated her understanding and agreement with the plan. She notes mild intermittent pain at the site of her incision and her L ankle at her wounds, but otherwise feels well. Her only other complaint is that she will be unable to eat or drink today before the procedure. She denies SOB, chest pain, f/v, n/v. Dr. Siddiqi saw patient at bedside and explained the plans for today.     MEDICATIONS  (STANDING):  clindamycin IVPB      clindamycin IVPB 600 milliGRAM(s) IV Intermittent every 8 hours  influenza   Vaccine 0.5 milliLiter(s) IntraMuscular once  nicotine -   7 mG/24Hr(s) Patch 1 patch Transdermal daily  piperacillin/tazobactam IVPB..      piperacillin/tazobactam IVPB.. 3.375 Gram(s) IV Intermittent every 8 hours  sodium chloride 0.9% lock flush 3 milliLiter(s) IV Push every 8 hours  sodium chloride 0.9%. 1000 milliLiter(s) (75 mL/Hr) IV Continuous <Continuous>    Vital Signs Last 24 Hrs  T(C): 36.9 (06 Sep 2019 07:50), Max: 36.9 (06 Sep 2019 07:50)  T(F): 98.5 (06 Sep 2019 07:50), Max: 98.5 (06 Sep 2019 07:50)  HR: 79 (06 Sep 2019 07:50) (64 - 79)  BP: 125/76 (06 Sep 2019 07:50) (125/76 - 161/73)  BP(mean): --  RR: 18 (06 Sep 2019 07:50) (18 - 18)  SpO2: 90% (06 Sep 2019 07:50) (90% - 92%)    PE  Gen: comfortably resting in bed.   Pulm: no increased wob  Ext: incision at L medial thigh with mild serosanguinous drainage. Incision with decreased erythema compared to yesterday. LLE ulcer with mild drainage.     I&O's Detail    05 Sep 2019 07:01  -  06 Sep 2019 07:00  --------------------------------------------------------  IN:    sodium chloride 0.9%.: 450 mL  Total IN: 450 mL    OUT:  Total OUT: 0 mL    Total NET: 450 mL          LABS:                        13.5   6.94  )-----------( 331      ( 05 Sep 2019 19:14 )             40.8     09-05    137  |  99  |  9.0  ----------------------------<  111  3.6   |  25.0  |  0.66    Ca    9.5      05 Sep 2019 19:14  Mg     1.9     09-05    TPro  7.4  /  Alb  4.0  /  TBili  <0.2<L>  /  DBili  x   /  AST  11  /  ALT  8   /  AlkPhos  99  09-05    PT/INR - ( 05 Sep 2019 19:14 )   PT: 11.8 sec;   INR: 1.03 ratio         PTT - ( 05 Sep 2019 19:14 )  PTT:27.4 sec

## 2019-09-06 NOTE — BRIEF OPERATIVE NOTE - OPERATION/FINDINGS
LLE nonhealing wound that was washed with pulsevac and debrided. Healthy bleeding tissue was observed. Fem-Pop graft was not observed. Wound closed with wound vac.

## 2019-09-06 NOTE — PROGRESS NOTE ADULT - ASSESSMENT
A/P: Betty Sheehan is a 59y F with PMH significant for non-healing LLE ulcer, PVD, dyslipidemia, osteomyelitis LLE s/p L PTFE bypass presenting for wound infection.  -f/u blood cx  -patient to remain NPO for OR today  -Patient to go for CT this morning

## 2019-09-07 DIAGNOSIS — L03.90 CELLULITIS, UNSPECIFIED: ICD-10-CM

## 2019-09-07 LAB
ANION GAP SERPL CALC-SCNC: 10 MMOL/L — SIGNIFICANT CHANGE UP (ref 5–17)
BUN SERPL-MCNC: 9 MG/DL — SIGNIFICANT CHANGE UP (ref 8–20)
CALCIUM SERPL-MCNC: 8.8 MG/DL — SIGNIFICANT CHANGE UP (ref 8.6–10.2)
CHLORIDE SERPL-SCNC: 103 MMOL/L — SIGNIFICANT CHANGE UP (ref 98–107)
CO2 SERPL-SCNC: 24 MMOL/L — SIGNIFICANT CHANGE UP (ref 22–29)
CREAT SERPL-MCNC: 0.74 MG/DL — SIGNIFICANT CHANGE UP (ref 0.5–1.3)
GLUCOSE SERPL-MCNC: 133 MG/DL — HIGH (ref 70–115)
GRAM STN FLD: SIGNIFICANT CHANGE UP
HCT VFR BLD CALC: 35.9 % — SIGNIFICANT CHANGE UP (ref 34.5–45)
HCV AB S/CO SERPL IA: 0.17 S/CO — SIGNIFICANT CHANGE UP (ref 0–0.99)
HCV AB SERPL-IMP: SIGNIFICANT CHANGE UP
HGB BLD-MCNC: 11.7 G/DL — SIGNIFICANT CHANGE UP (ref 11.5–15.5)
MAGNESIUM SERPL-MCNC: 2 MG/DL — SIGNIFICANT CHANGE UP (ref 1.6–2.6)
MCHC RBC-ENTMCNC: 28.5 PG — SIGNIFICANT CHANGE UP (ref 27–34)
MCHC RBC-ENTMCNC: 32.6 GM/DL — SIGNIFICANT CHANGE UP (ref 32–36)
MCV RBC AUTO: 87.6 FL — SIGNIFICANT CHANGE UP (ref 80–100)
PLATELET # BLD AUTO: 321 K/UL — SIGNIFICANT CHANGE UP (ref 150–400)
POTASSIUM SERPL-MCNC: 4.6 MMOL/L — SIGNIFICANT CHANGE UP (ref 3.5–5.3)
POTASSIUM SERPL-SCNC: 4.6 MMOL/L — SIGNIFICANT CHANGE UP (ref 3.5–5.3)
RBC # BLD: 4.1 M/UL — SIGNIFICANT CHANGE UP (ref 3.8–5.2)
RBC # FLD: 12.8 % — SIGNIFICANT CHANGE UP (ref 10.3–14.5)
SODIUM SERPL-SCNC: 137 MMOL/L — SIGNIFICANT CHANGE UP (ref 135–145)
SPECIMEN SOURCE: SIGNIFICANT CHANGE UP
WBC # BLD: 6.07 K/UL — SIGNIFICANT CHANGE UP (ref 3.8–10.5)
WBC # FLD AUTO: 6.07 K/UL — SIGNIFICANT CHANGE UP (ref 3.8–10.5)

## 2019-09-07 RX ADMIN — HEPARIN SODIUM 5000 UNIT(S): 5000 INJECTION INTRAVENOUS; SUBCUTANEOUS at 06:40

## 2019-09-07 RX ADMIN — PIPERACILLIN AND TAZOBACTAM 25 GRAM(S): 4; .5 INJECTION, POWDER, LYOPHILIZED, FOR SOLUTION INTRAVENOUS at 15:27

## 2019-09-07 RX ADMIN — PIPERACILLIN AND TAZOBACTAM 25 GRAM(S): 4; .5 INJECTION, POWDER, LYOPHILIZED, FOR SOLUTION INTRAVENOUS at 06:40

## 2019-09-07 RX ADMIN — OXYCODONE HYDROCHLORIDE 10 MILLIGRAM(S): 5 TABLET ORAL at 15:56

## 2019-09-07 RX ADMIN — OXYCODONE HYDROCHLORIDE 10 MILLIGRAM(S): 5 TABLET ORAL at 15:26

## 2019-09-07 RX ADMIN — OXYCODONE HYDROCHLORIDE 10 MILLIGRAM(S): 5 TABLET ORAL at 00:39

## 2019-09-07 RX ADMIN — Medication 1 PATCH: at 20:14

## 2019-09-07 RX ADMIN — OXYCODONE HYDROCHLORIDE 10 MILLIGRAM(S): 5 TABLET ORAL at 20:41

## 2019-09-07 RX ADMIN — Medication 1 PATCH: at 07:30

## 2019-09-07 RX ADMIN — Medication 100 MILLIGRAM(S): at 20:10

## 2019-09-07 RX ADMIN — HEPARIN SODIUM 5000 UNIT(S): 5000 INJECTION INTRAVENOUS; SUBCUTANEOUS at 23:13

## 2019-09-07 RX ADMIN — OXYCODONE HYDROCHLORIDE 10 MILLIGRAM(S): 5 TABLET ORAL at 04:53

## 2019-09-07 RX ADMIN — OXYCODONE HYDROCHLORIDE 10 MILLIGRAM(S): 5 TABLET ORAL at 01:09

## 2019-09-07 RX ADMIN — SODIUM CHLORIDE 3 MILLILITER(S): 9 INJECTION INTRAMUSCULAR; INTRAVENOUS; SUBCUTANEOUS at 22:17

## 2019-09-07 RX ADMIN — SODIUM CHLORIDE 3 MILLILITER(S): 9 INJECTION INTRAMUSCULAR; INTRAVENOUS; SUBCUTANEOUS at 05:24

## 2019-09-07 RX ADMIN — PIPERACILLIN AND TAZOBACTAM 25 GRAM(S): 4; .5 INJECTION, POWDER, LYOPHILIZED, FOR SOLUTION INTRAVENOUS at 23:13

## 2019-09-07 RX ADMIN — SODIUM CHLORIDE 3 MILLILITER(S): 9 INJECTION INTRAMUSCULAR; INTRAVENOUS; SUBCUTANEOUS at 15:30

## 2019-09-07 RX ADMIN — OXYCODONE HYDROCHLORIDE 10 MILLIGRAM(S): 5 TABLET ORAL at 20:11

## 2019-09-07 RX ADMIN — OXYCODONE HYDROCHLORIDE 10 MILLIGRAM(S): 5 TABLET ORAL at 11:12

## 2019-09-07 RX ADMIN — Medication 1 PATCH: at 12:00

## 2019-09-07 RX ADMIN — HEPARIN SODIUM 5000 UNIT(S): 5000 INJECTION INTRAVENOUS; SUBCUTANEOUS at 15:27

## 2019-09-07 RX ADMIN — Medication 100 MILLIGRAM(S): at 03:34

## 2019-09-07 RX ADMIN — Medication 1 PATCH: at 11:14

## 2019-09-07 RX ADMIN — Medication 100 MILLIGRAM(S): at 12:50

## 2019-09-07 RX ADMIN — OXYCODONE HYDROCHLORIDE 10 MILLIGRAM(S): 5 TABLET ORAL at 11:42

## 2019-09-07 RX ADMIN — OXYCODONE HYDROCHLORIDE 10 MILLIGRAM(S): 5 TABLET ORAL at 05:23

## 2019-09-07 NOTE — PROGRESS NOTE ADULT - SUBJECTIVE AND OBJECTIVE BOX
Post-op Check    Subjective:  Pt offers no acute complaints at this time. Pain well controlled on current regiment. Denies chest pain, SOB, palpitations, parasthesias to LLE. Wound vac to left inner thigh well seated, minimal output     STATUS POST:  LLE nonhealing wound that was washed with pulsevac and debrided. Healthy bleeding tissue was observed. Fem-Pop graft was not observed. Wound closed with wound vac.    POST OPERATIVE DAY #: 1    MEDICATIONS  (STANDING):  clindamycin IVPB      clindamycin IVPB 600 milliGRAM(s) IV Intermittent every 8 hours  heparin  Injectable 5000 Unit(s) SubCutaneous every 8 hours  influenza   Vaccine 0.5 milliLiter(s) IntraMuscular once  nicotine -   7 mG/24Hr(s) Patch 1 patch Transdermal daily  piperacillin/tazobactam IVPB..      piperacillin/tazobactam IVPB.. 3.375 Gram(s) IV Intermittent every 8 hours  sodium chloride 0.9% lock flush 3 milliLiter(s) IV Push every 8 hours    MEDICATIONS  (PRN):  oxyCODONE    IR 5 milliGRAM(s) Oral every 4 hours PRN Moderate Pain (4 - 6)  oxyCODONE    IR 10 milliGRAM(s) Oral every 4 hours PRN Severe Pain (7 - 10)      Vital Signs Last 24 Hrs  T(C): 36.6 (06 Sep 2019 23:48), Max: 37.1 (06 Sep 2019 13:36)  T(F): 97.8 (06 Sep 2019 23:48), Max: 98.8 (06 Sep 2019 13:36)  HR: 65 (06 Sep 2019 23:48) (65 - 79)  BP: 116/68 (06 Sep 2019 23:48) (116/68 - 172/67)  BP(mean): --  RR: 18 (06 Sep 2019 23:48) (11 - 18)  SpO2: 94% (06 Sep 2019 23:48) (90% - 100%)    Physical Exam:    Constitutional: NAD  HEENT: PERRL,   Neck: No JVD, FROM without pain  Respiratory: no accessory muscle use, respirations non-labored  GI: abdomen soft, non-tender, non-distended  Ext: s/p debridement of lle inner thigh non-healing wound, thigh compartment soft, NVI, wound vac well seated, surrounding skin of wound with out skin changes  Neurological: A&O x 3; without gross deficit    A:     P:  Continue current care  Pain control  OOB as tolerated  Encourage IS  DVT ppx

## 2019-09-07 NOTE — PROGRESS NOTE ADULT - PROBLEM SELECTOR PLAN 1
58 y/o F with non-healing lle inner thigh wound now s/p debridement  Continue IV antibiotics, consider changing to po regimen  wound vac change as per attending preference   pain control  home medications  regular diet  oob  dvt ppx

## 2019-09-08 RX ORDER — BACITRACIN ZINC 500 UNIT/G
1 OINTMENT IN PACKET (EA) TOPICAL
Refills: 0 | Status: DISCONTINUED | OUTPATIENT
Start: 2019-09-08 | End: 2019-09-10

## 2019-09-08 RX ADMIN — OXYCODONE HYDROCHLORIDE 10 MILLIGRAM(S): 5 TABLET ORAL at 17:41

## 2019-09-08 RX ADMIN — HEPARIN SODIUM 5000 UNIT(S): 5000 INJECTION INTRAVENOUS; SUBCUTANEOUS at 11:46

## 2019-09-08 RX ADMIN — Medication 1 PATCH: at 11:45

## 2019-09-08 RX ADMIN — OXYCODONE HYDROCHLORIDE 10 MILLIGRAM(S): 5 TABLET ORAL at 21:43

## 2019-09-08 RX ADMIN — Medication 1 PATCH: at 11:46

## 2019-09-08 RX ADMIN — Medication 300 MILLIGRAM(S): at 16:55

## 2019-09-08 RX ADMIN — Medication 1 PATCH: at 19:25

## 2019-09-08 RX ADMIN — OXYCODONE HYDROCHLORIDE 10 MILLIGRAM(S): 5 TABLET ORAL at 13:30

## 2019-09-08 RX ADMIN — OXYCODONE HYDROCHLORIDE 10 MILLIGRAM(S): 5 TABLET ORAL at 12:51

## 2019-09-08 RX ADMIN — SODIUM CHLORIDE 3 MILLILITER(S): 9 INJECTION INTRAMUSCULAR; INTRAVENOUS; SUBCUTANEOUS at 11:41

## 2019-09-08 RX ADMIN — SODIUM CHLORIDE 3 MILLILITER(S): 9 INJECTION INTRAMUSCULAR; INTRAVENOUS; SUBCUTANEOUS at 21:45

## 2019-09-08 RX ADMIN — Medication 300 MILLIGRAM(S): at 05:07

## 2019-09-08 RX ADMIN — OXYCODONE HYDROCHLORIDE 10 MILLIGRAM(S): 5 TABLET ORAL at 05:37

## 2019-09-08 RX ADMIN — OXYCODONE HYDROCHLORIDE 10 MILLIGRAM(S): 5 TABLET ORAL at 17:28

## 2019-09-08 RX ADMIN — Medication 300 MILLIGRAM(S): at 21:44

## 2019-09-08 RX ADMIN — HEPARIN SODIUM 5000 UNIT(S): 5000 INJECTION INTRAVENOUS; SUBCUTANEOUS at 21:44

## 2019-09-08 RX ADMIN — Medication 1 TABLET(S): at 05:07

## 2019-09-08 RX ADMIN — Medication 1 TABLET(S): at 16:55

## 2019-09-08 RX ADMIN — SODIUM CHLORIDE 3 MILLILITER(S): 9 INJECTION INTRAMUSCULAR; INTRAVENOUS; SUBCUTANEOUS at 05:08

## 2019-09-08 RX ADMIN — HEPARIN SODIUM 5000 UNIT(S): 5000 INJECTION INTRAVENOUS; SUBCUTANEOUS at 05:08

## 2019-09-08 RX ADMIN — Medication 1 APPLICATION(S): at 21:40

## 2019-09-08 RX ADMIN — OXYCODONE HYDROCHLORIDE 10 MILLIGRAM(S): 5 TABLET ORAL at 05:07

## 2019-09-08 RX ADMIN — OXYCODONE HYDROCHLORIDE 10 MILLIGRAM(S): 5 TABLET ORAL at 22:30

## 2019-09-08 RX ADMIN — Medication 1 PATCH: at 07:15

## 2019-09-08 NOTE — PROGRESS NOTE ADULT - SUBJECTIVE AND OBJECTIVE BOX
INTERVAL HPI/OVERNIGHT EVENTS:    No acute events overnight, no new events for patient. She has no acute complaints at this time. Wondering when she can go home. Denies f/c, n/v - tolerating diet- , No CP or SOB      MEDICATIONS  (STANDING):  clindamycin IVPB      clindamycin IVPB 600 milliGRAM(s) IV Intermittent every 8 hours  heparin  Injectable 5000 Unit(s) SubCutaneous every 8 hours  influenza   Vaccine 0.5 milliLiter(s) IntraMuscular once  nicotine -   7 mG/24Hr(s) Patch 1 patch Transdermal daily  piperacillin/tazobactam IVPB..      piperacillin/tazobactam IVPB.. 3.375 Gram(s) IV Intermittent every 8 hours  sodium chloride 0.9% lock flush 3 milliLiter(s) IV Push every 8 hours    MEDICATIONS  (PRN):  oxyCODONE    IR 5 milliGRAM(s) Oral every 4 hours PRN Moderate Pain (4 - 6)  oxyCODONE    IR 10 milliGRAM(s) Oral every 4 hours PRN Severe Pain (7 - 10)      Vital Signs Last 24 Hrs  T(C): 36.3 (07 Sep 2019 23:36), Max: 36.7 (07 Sep 2019 08:45)  T(F): 97.4 (07 Sep 2019 23:36), Max: 98.1 (07 Sep 2019 08:45)  HR: 67 (07 Sep 2019 23:36) (67 - 76)  BP: 147/80 (07 Sep 2019 23:36) (120/73 - 157/78)  BP(mean): --  RR: 18 (07 Sep 2019 23:36) (18 - 18)  SpO2: 92% (07 Sep 2019 23:36) (92% - 100%)    Physical Exam:  Constitutional: NAD  HEENT: PERRL,   Neck: No JVD, FROM without pain  Respiratory: no accessory muscle use, respirations non-labored  GI: abdomen soft, non-tender, non-distended  Ext: s/p debridement of LLE inner thigh non-healing wound, thigh compartment soft, NVI, wound vac well seated, surrounding skin of wound with out skin changes. Dressing on dorsal aspect of right foot with dressing in place c/d/i  Neurological: A&O x 3; without gross deficit      I&O's Detail    06 Sep 2019 07:01  -  07 Sep 2019 07:00  --------------------------------------------------------  IN:    IV PiggyBack: 50 mL    lactated ringers.: 200 mL  Total IN: 250 mL    OUT:  Total OUT: 0 mL    Total NET: 250 mL          LABS:                        11.7   6.07  )-----------( 321      ( 07 Sep 2019 07:31 )             35.9     09-07    137  |  103  |  9.0  ----------------------------<  133<H>  4.6   |  24.0  |  0.74    Ca    8.8      07 Sep 2019 07:31  Mg     2.0     09-07            RADIOLOGY & ADDITIONAL STUDIES:

## 2019-09-08 NOTE — PROGRESS NOTE ADULT - ASSESSMENT
Plan: 60 y/o F with non-healing lle inner thigh wound now s/p debridement   - Continue antibiotics, On  a PO regimen  - wound vac change as needed  - set up home wound vac   - continue to follow OR cultures, currently NGTD

## 2019-09-09 RX ORDER — CALCIUM CARBONATE 500(1250)
1 TABLET ORAL DAILY
Refills: 0 | Status: DISCONTINUED | OUTPATIENT
Start: 2019-09-10 | End: 2019-09-10

## 2019-09-09 RX ORDER — CALCIUM CARBONATE 500(1250)
TABLET ORAL
Refills: 0 | Status: DISCONTINUED | OUTPATIENT
Start: 2019-09-09 | End: 2019-09-10

## 2019-09-09 RX ORDER — CALCIUM CARBONATE 500(1250)
1 TABLET ORAL ONCE
Refills: 0 | Status: COMPLETED | OUTPATIENT
Start: 2019-09-09 | End: 2019-09-09

## 2019-09-09 RX ADMIN — OXYCODONE HYDROCHLORIDE 10 MILLIGRAM(S): 5 TABLET ORAL at 11:02

## 2019-09-09 RX ADMIN — OXYCODONE HYDROCHLORIDE 10 MILLIGRAM(S): 5 TABLET ORAL at 18:32

## 2019-09-09 RX ADMIN — Medication 1 TABLET(S): at 05:24

## 2019-09-09 RX ADMIN — Medication 1 TABLET(S): at 23:28

## 2019-09-09 RX ADMIN — SODIUM CHLORIDE 3 MILLILITER(S): 9 INJECTION INTRAMUSCULAR; INTRAVENOUS; SUBCUTANEOUS at 22:00

## 2019-09-09 RX ADMIN — OXYCODONE HYDROCHLORIDE 10 MILLIGRAM(S): 5 TABLET ORAL at 22:31

## 2019-09-09 RX ADMIN — HEPARIN SODIUM 5000 UNIT(S): 5000 INJECTION INTRAVENOUS; SUBCUTANEOUS at 13:32

## 2019-09-09 RX ADMIN — Medication 300 MILLIGRAM(S): at 13:31

## 2019-09-09 RX ADMIN — OXYCODONE HYDROCHLORIDE 10 MILLIGRAM(S): 5 TABLET ORAL at 14:10

## 2019-09-09 RX ADMIN — Medication 300 MILLIGRAM(S): at 22:03

## 2019-09-09 RX ADMIN — HEPARIN SODIUM 5000 UNIT(S): 5000 INJECTION INTRAVENOUS; SUBCUTANEOUS at 05:24

## 2019-09-09 RX ADMIN — Medication 1 PATCH: at 13:31

## 2019-09-09 RX ADMIN — OXYCODONE HYDROCHLORIDE 10 MILLIGRAM(S): 5 TABLET ORAL at 09:13

## 2019-09-09 RX ADMIN — SODIUM CHLORIDE 3 MILLILITER(S): 9 INJECTION INTRAMUSCULAR; INTRAVENOUS; SUBCUTANEOUS at 11:02

## 2019-09-09 RX ADMIN — OXYCODONE HYDROCHLORIDE 10 MILLIGRAM(S): 5 TABLET ORAL at 05:25

## 2019-09-09 RX ADMIN — Medication 1 PATCH: at 13:09

## 2019-09-09 RX ADMIN — Medication 1 TABLET(S): at 18:26

## 2019-09-09 RX ADMIN — Medication 1 APPLICATION(S): at 18:27

## 2019-09-09 RX ADMIN — SODIUM CHLORIDE 3 MILLILITER(S): 9 INJECTION INTRAMUSCULAR; INTRAVENOUS; SUBCUTANEOUS at 13:08

## 2019-09-09 RX ADMIN — OXYCODONE HYDROCHLORIDE 10 MILLIGRAM(S): 5 TABLET ORAL at 23:01

## 2019-09-09 RX ADMIN — Medication 1 PATCH: at 19:14

## 2019-09-09 RX ADMIN — OXYCODONE HYDROCHLORIDE 10 MILLIGRAM(S): 5 TABLET ORAL at 13:31

## 2019-09-09 RX ADMIN — Medication 1 APPLICATION(S): at 05:25

## 2019-09-09 RX ADMIN — Medication 300 MILLIGRAM(S): at 05:24

## 2019-09-09 RX ADMIN — HEPARIN SODIUM 5000 UNIT(S): 5000 INJECTION INTRAVENOUS; SUBCUTANEOUS at 22:03

## 2019-09-09 NOTE — PROGRESS NOTE ADULT - SUBJECTIVE AND OBJECTIVE BOX
INTERVAL HPI/OVERNIGHT EVENTS:    SUBJECTIVE: Patient seen at bedside this morning and found in no acute distress. No acute events overnight. Patient has no complaints at present and denies pain while resting in bed. Wound vac to L medial thigh in place and functioning appropriately. Vascular surgery team will plan to change vac today prior to patient discharge.       MEDICATIONS  (STANDING):  amoxicillin  875 milliGRAM(s)/clavulanate 1 Tablet(s) Oral two times a day  BACItracin   Ointment 1 Application(s) Topical two times a day  clindamycin   Capsule 300 milliGRAM(s) Oral every 8 hours  heparin  Injectable 5000 Unit(s) SubCutaneous every 8 hours  influenza   Vaccine 0.5 milliLiter(s) IntraMuscular once  nicotine -   7 mG/24Hr(s) Patch 1 patch Transdermal daily  sodium chloride 0.9% lock flush 3 milliLiter(s) IV Push every 8 hours    MEDICATIONS  (PRN):  oxyCODONE    IR 5 milliGRAM(s) Oral every 4 hours PRN Moderate Pain (4 - 6)  oxyCODONE    IR 10 milliGRAM(s) Oral every 4 hours PRN Severe Pain (7 - 10)      Vital Signs Last 24 Hrs  T(C): --  T(F): --  HR: --  BP: --  BP(mean): --  RR: --  SpO2: --    PE  Gen: resting comfortably in bed  Pulm: no increased wob  Vasc: palpable DP pulses bilaterally, bilateral distal extremities with appropriate cap refill and no signs of decreased perfusion. bilateral feet warm.   Ext: L ankle ulcer with appropriate healing and granulation tissue. L medial thigh wound with vac in place to be changed this morning.       I&O's Detail    08 Sep 2019 07:01  -  09 Sep 2019 07:00  --------------------------------------------------------  IN:    Oral Fluid: 120 mL  Total IN: 120 mL    OUT:  Total OUT: 0 mL    Total NET: 120 mL

## 2019-09-09 NOTE — PROGRESS NOTE ADULT - ASSESSMENT
A/P:  58 y/o F with L medial thigh surgical wound now s/p debridement   - will discuss plan for home ABX with ID  - wound vac change today 9/9  - set up home wound vac   - continue to follow OR cultures, currently NGTD  - plan for dc home today with follow up in 1w with vascular surgery

## 2019-09-10 ENCOUNTER — TRANSCRIPTION ENCOUNTER (OUTPATIENT)
Age: 59
End: 2019-09-10

## 2019-09-10 VITALS
SYSTOLIC BLOOD PRESSURE: 152 MMHG | RESPIRATION RATE: 18 BRPM | TEMPERATURE: 98 F | OXYGEN SATURATION: 96 % | DIASTOLIC BLOOD PRESSURE: 91 MMHG | HEART RATE: 100 BPM

## 2019-09-10 LAB
-  AMIKACIN: SIGNIFICANT CHANGE UP
-  AMPICILLIN/SULBACTAM: SIGNIFICANT CHANGE UP
-  AMPICILLIN: SIGNIFICANT CHANGE UP
-  AZTREONAM: SIGNIFICANT CHANGE UP
-  CEFAZOLIN: SIGNIFICANT CHANGE UP
-  CEFEPIME: SIGNIFICANT CHANGE UP
-  CEFOXITIN: SIGNIFICANT CHANGE UP
-  CEFTRIAXONE: SIGNIFICANT CHANGE UP
-  CIPROFLOXACIN: SIGNIFICANT CHANGE UP
-  ERTAPENEM: SIGNIFICANT CHANGE UP
-  GENTAMICIN: SIGNIFICANT CHANGE UP
-  IMIPENEM: SIGNIFICANT CHANGE UP
-  LEVOFLOXACIN: SIGNIFICANT CHANGE UP
-  MEROPENEM: SIGNIFICANT CHANGE UP
-  PIPERACILLIN/TAZOBACTAM: SIGNIFICANT CHANGE UP
-  TOBRAMYCIN: SIGNIFICANT CHANGE UP
-  TRIMETHOPRIM/SULFAMETHOXAZOLE: SIGNIFICANT CHANGE UP
CULTURE RESULTS: SIGNIFICANT CHANGE UP
CULTURE RESULTS: SIGNIFICANT CHANGE UP
METHOD TYPE: SIGNIFICANT CHANGE UP
SPECIMEN SOURCE: SIGNIFICANT CHANGE UP
SPECIMEN SOURCE: SIGNIFICANT CHANGE UP

## 2019-09-10 RX ORDER — OXYCODONE HYDROCHLORIDE 5 MG/1
1 TABLET ORAL
Qty: 25 | Refills: 0
Start: 2019-09-10

## 2019-09-10 RX ORDER — HYDROMORPHONE HYDROCHLORIDE 2 MG/ML
0.5 INJECTION INTRAMUSCULAR; INTRAVENOUS; SUBCUTANEOUS ONCE
Refills: 0 | Status: DISCONTINUED | OUTPATIENT
Start: 2019-09-10 | End: 2019-09-10

## 2019-09-10 RX ADMIN — SODIUM CHLORIDE 3 MILLILITER(S): 9 INJECTION INTRAMUSCULAR; INTRAVENOUS; SUBCUTANEOUS at 13:34

## 2019-09-10 RX ADMIN — Medication 1 TABLET(S): at 11:59

## 2019-09-10 RX ADMIN — SODIUM CHLORIDE 3 MILLILITER(S): 9 INJECTION INTRAMUSCULAR; INTRAVENOUS; SUBCUTANEOUS at 05:35

## 2019-09-10 RX ADMIN — Medication 300 MILLIGRAM(S): at 05:44

## 2019-09-10 RX ADMIN — Medication 1 APPLICATION(S): at 05:44

## 2019-09-10 RX ADMIN — Medication 1 TABLET(S): at 05:44

## 2019-09-10 RX ADMIN — OXYCODONE HYDROCHLORIDE 10 MILLIGRAM(S): 5 TABLET ORAL at 05:43

## 2019-09-10 RX ADMIN — Medication 300 MILLIGRAM(S): at 13:49

## 2019-09-10 RX ADMIN — OXYCODONE HYDROCHLORIDE 10 MILLIGRAM(S): 5 TABLET ORAL at 12:59

## 2019-09-10 RX ADMIN — OXYCODONE HYDROCHLORIDE 10 MILLIGRAM(S): 5 TABLET ORAL at 11:59

## 2019-09-10 RX ADMIN — Medication 1 PATCH: at 10:34

## 2019-09-10 RX ADMIN — Medication 1 PATCH: at 10:33

## 2019-09-10 RX ADMIN — OXYCODONE HYDROCHLORIDE 10 MILLIGRAM(S): 5 TABLET ORAL at 06:13

## 2019-09-10 RX ADMIN — HYDROMORPHONE HYDROCHLORIDE 0.5 MILLIGRAM(S): 2 INJECTION INTRAMUSCULAR; INTRAVENOUS; SUBCUTANEOUS at 12:38

## 2019-09-10 RX ADMIN — HYDROMORPHONE HYDROCHLORIDE 0.5 MILLIGRAM(S): 2 INJECTION INTRAMUSCULAR; INTRAVENOUS; SUBCUTANEOUS at 12:23

## 2019-09-10 RX ADMIN — HEPARIN SODIUM 5000 UNIT(S): 5000 INJECTION INTRAVENOUS; SUBCUTANEOUS at 05:43

## 2019-09-10 NOTE — PROGRESS NOTE ADULT - REASON FOR ADMISSION
concern for cellulitis and incisional infection

## 2019-09-10 NOTE — PROGRESS NOTE ADULT - SUBJECTIVE AND OBJECTIVE BOX
Patient seen and examined with Attending this AM.    No acute events overnight.    Home vac supplies at bedside    New vac applied to left le wound.    Wound is 9cm x 2cm x 2 cm deep    Clean, granulating left medial thigh wound    Patient tolerated procedure     Patient to  be discharged to home today

## 2019-09-10 NOTE — DISCHARGE NOTE PROVIDER - HOSPITAL COURSE
Patient is s/p left SFA to above knee bypass 7/19/2019.    Patient admitted 9/5/2019 due to concerns for surgical wound infection.    Left thigh wound debridement and vac placement performed on 9/6.    Patient received antibiotics postoperatively and wound responded appropriately with negative pressure management.    Patient currently stable for discharge to home with VNS following.            Wound vac to be changed every 3 days.        Patient to continue Augmentin for 14 days.        Patient to follow up in the office in 2 weeks.

## 2019-09-10 NOTE — DISCHARGE NOTE NURSING/CASE MANAGEMENT/SOCIAL WORK - PATIENT PORTAL LINK FT
You can access the FollowMyHealth Patient Portal offered by [facility] by registering at the following website: [email or web address]. By joining [facility]’s FollowMyHealth portal, you will also be able to view your health information using other applications (apps) compatible with our system.

## 2019-09-10 NOTE — DISCHARGE NOTE NURSING/CASE MANAGEMENT/SOCIAL WORK - NSDCPEWEB_GEN_ALL_CORE
[place] website --- [email or web address] for Tobacco Control website --- [email or web address]

## 2019-09-10 NOTE — DISCHARGE NOTE PROVIDER - NSDCFUSCHEDAPPT_GEN_ALL_CORE_FT
KARLA TOTH ; 10/15/2019 ; [facility] [place] [facility] [place] KARLA TOTH ; 10/15/2019 ; [facility] Cardiology [facility] [place]

## 2019-09-10 NOTE — DISCHARGE NOTE NURSING/CASE MANAGEMENT/SOCIAL WORK - NSDCPEEMAIL_GEN_ALL_CORE
[facility] for Tobacco Control email [email or web address]

## 2019-09-10 NOTE — DISCHARGE NOTE PROVIDER - CARE PROVIDER_API CALL
Chung Siddiqi)  Surgery  [address], [address] [place] [address]  Phone: [phone]  Fax: [phone]  Follow Up Time:

## 2019-09-12 ENCOUNTER — APPOINTMENT (OUTPATIENT)
Dept: VASCULAR SURGERY | Facility: CLINIC | Age: 59
End: 2019-09-12
Payer: MEDICAID

## 2019-09-12 LAB
CULTURE RESULTS: SIGNIFICANT CHANGE UP
ORGANISM # SPEC MICROSCOPIC CNT: SIGNIFICANT CHANGE UP
ORGANISM # SPEC MICROSCOPIC CNT: SIGNIFICANT CHANGE UP
SPECIMEN SOURCE: SIGNIFICANT CHANGE UP

## 2019-09-12 PROCEDURE — 99024 POSTOP FOLLOW-UP VISIT: CPT

## 2019-09-12 NOTE — ASSESSMENT
[FreeTextEntry1] : 59 year old female with Hx of spider bite in L leg in 2017 that resulted in a non-healing wound. The wound has begun to heal since she had bypass.\par Now with left thigh incision infections/p fem-pop bypass\par S/P Left thigh wound wash out and VAC placement last week\par Healing wel\par -Continue VAC changes 3x/week  by VNS\par -Complete course of PO Abx\par -RTO 2 weeks

## 2019-09-12 NOTE — PHYSICAL EXAM
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 Good granulation. No erythema or drainage

## 2019-09-12 NOTE — HISTORY OF PRESENT ILLNESS
[FreeTextEntry1] : 60 y/o female S/P L prox SFA to AK pop bypass with 8 MM ringed PTFE 7/19/19 and ulcer to lateral aspect of distal LLE. She had been using Gillian on her wound but ran out of topical application and would like a different cream. The wound has erythema around the incision with a 1 cm x 0.2 mm dehiscence. There is no drainage. Erythema extends medially to posterior thigh. She states there is pain in her thigh and it hurts to change clothes. She continues to smoke, but has significantly cut back. She continues on ASA 81 mg and Atorvastatin. \par Continues to have drainage and redness despite 1 week of PO antibiotics.\par no fever or chills\par  [de-identified] : S/P left thigh washout in OR last week\par No exposed graft. \par Wound cultures grew Enterobacter for which she is on Augmentin.\par Has left thigh wound VAC

## 2019-09-26 ENCOUNTER — APPOINTMENT (OUTPATIENT)
Dept: VASCULAR SURGERY | Facility: CLINIC | Age: 59
End: 2019-09-26
Payer: MEDICAID

## 2019-09-26 VITALS
BODY MASS INDEX: 34.87 KG/M2 | WEIGHT: 217 LBS | HEART RATE: 157 BPM | HEIGHT: 66 IN | OXYGEN SATURATION: 93 % | SYSTOLIC BLOOD PRESSURE: 152 MMHG | DIASTOLIC BLOOD PRESSURE: 112 MMHG

## 2019-09-26 PROCEDURE — 99024 POSTOP FOLLOW-UP VISIT: CPT

## 2019-09-26 NOTE — PHYSICAL EXAM
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 Good granulation. No erythema or drainage

## 2019-09-26 NOTE — ASSESSMENT
[FreeTextEntry1] : 59 year old female with Hx of spider bite in L leg in 2017 that resulted in a non-healing wound. The wound has begun to heal since she had bypass.\par Now with left thigh incision infections/p fem-pop bypass\par S/P Left thigh wound wash out and VAC placement 3 weeks ago\par Healing weli\par -Local wound care with gauze dressing to thigh wound and Santyl for leg wound\par -Smoking cessation advised\par -Wound check in 4 weeks

## 2019-09-26 NOTE — HISTORY OF PRESENT ILLNESS
[FreeTextEntry1] : 60 y/o female S/P L prox SFA to AK pop bypass with 8 MM ringed PTFE 7/19/19 and ulcer to lateral aspect of distal LLE. She had been using Gillian on her wound but ran out of topical application and would like a different cream. The wound has erythema around the incision with a 1 cm x 0.2 mm dehiscence. There is no drainage. Erythema extends medially to posterior thigh. She states there is pain in her thigh and it hurts to change clothes. She continues to smoke, but has significantly cut back. She continues on ASA 81 mg and Atorvastatin. \par Continues to have drainage and redness despite 1 week of PO antibiotics.\par no fever or chills\par S/P left thigh washout in OR 3 weeks ago\par No exposed graft. \par Wound cultures grew Enterobacter for which she is on Augmentin.\par Has left thigh wound VAC [de-identified] : Thigh wound being managed with VAC\par Now contracted and small\par Has completed course of PO antibiotics

## 2019-10-04 NOTE — CHART NOTE - NSCHARTNOTEFT_GEN_A_CORE
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 The K or [place] antigen belongs to the [place] group of red cell antigens. It is considered to be clinically significant and associated with moderate to severe transfusion reactions and FHDN (fetal hemolytic disease of the ) In HDFN, The Anti-Kimmy antibody can also cause profound anemia in the  secondary to suppression of hematopoiesis. This effect can persist for several weeks following delivery. The [place] antigen is only expressed on the red cells of 9% of Caucasians (91% lack the antigen) and on 2% (98% lack the antigen) of red cells of AAs. Z01.84

## 2019-10-15 ENCOUNTER — APPOINTMENT (OUTPATIENT)
Dept: CARDIOLOGY | Facility: CLINIC | Age: 59
End: 2019-10-15

## 2019-10-31 PROCEDURE — 86922 COMPATIBILITY TEST ANTIGLOB: CPT

## 2019-10-31 PROCEDURE — 86880 COOMBS TEST DIRECT: CPT

## 2019-10-31 PROCEDURE — 83735 ASSAY OF MAGNESIUM: CPT

## 2019-10-31 PROCEDURE — 85610 PROTHROMBIN TIME: CPT

## 2019-10-31 PROCEDURE — 87186 SC STD MICRODIL/AGAR DIL: CPT

## 2019-10-31 PROCEDURE — 86850 RBC ANTIBODY SCREEN: CPT

## 2019-10-31 PROCEDURE — 80053 COMPREHEN METABOLIC PANEL: CPT

## 2019-10-31 PROCEDURE — 86803 HEPATITIS C AB TEST: CPT

## 2019-10-31 PROCEDURE — 86902 BLOOD TYPE ANTIGEN DONOR EA: CPT

## 2019-10-31 PROCEDURE — 87040 BLOOD CULTURE FOR BACTERIA: CPT

## 2019-10-31 PROCEDURE — 80048 BASIC METABOLIC PNL TOTAL CA: CPT

## 2019-10-31 PROCEDURE — 86901 BLOOD TYPING SEROLOGIC RH(D): CPT

## 2019-10-31 PROCEDURE — 73701 CT LOWER EXTREMITY W/DYE: CPT

## 2019-10-31 PROCEDURE — 86870 RBC ANTIBODY IDENTIFICATION: CPT

## 2019-10-31 PROCEDURE — 36415 COLL VENOUS BLD VENIPUNCTURE: CPT

## 2019-10-31 PROCEDURE — 87075 CULTR BACTERIA EXCEPT BLOOD: CPT

## 2019-10-31 PROCEDURE — 87070 CULTURE OTHR SPECIMN AEROBIC: CPT

## 2019-10-31 PROCEDURE — 93005 ELECTROCARDIOGRAM TRACING: CPT

## 2019-10-31 PROCEDURE — 93970 EXTREMITY STUDY: CPT

## 2019-10-31 PROCEDURE — 85027 COMPLETE CBC AUTOMATED: CPT

## 2019-10-31 PROCEDURE — 86900 BLOOD TYPING SEROLOGIC ABO: CPT

## 2019-10-31 PROCEDURE — 85730 THROMBOPLASTIN TIME PARTIAL: CPT

## 2019-11-14 ENCOUNTER — APPOINTMENT (OUTPATIENT)
Dept: VASCULAR SURGERY | Facility: CLINIC | Age: 59
End: 2019-11-14
Payer: MEDICAID

## 2019-11-14 VITALS
DIASTOLIC BLOOD PRESSURE: 94 MMHG | RESPIRATION RATE: 16 BRPM | TEMPERATURE: 98.8 F | BODY MASS INDEX: 34.87 KG/M2 | WEIGHT: 217 LBS | SYSTOLIC BLOOD PRESSURE: 166 MMHG | HEIGHT: 66 IN | OXYGEN SATURATION: 97 % | HEART RATE: 79 BPM

## 2019-11-14 PROCEDURE — 99024 POSTOP FOLLOW-UP VISIT: CPT

## 2019-11-14 NOTE — PHYSICAL EXAM
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 Small scab on scar No erythema or drainage\par Left anterior shin wound ghranulating wel

## 2019-11-14 NOTE — HISTORY OF PRESENT ILLNESS
[FreeTextEntry1] : 58 y/o female S/P L prox SFA to AK pop bypass with 8 MM ringed PTFE 7/19/19 and ulcer to lateral aspect of distal LLE. She had been using Gillian on her wound but ran out of topical application and would like a different cream. The wound has erythema around the incision with a 1 cm x 0.2 mm dehiscence. There is no drainage. Erythema extends medially to posterior thigh. She states there is pain in her thigh and it hurts to change clothes. She continues to smoke, but has significantly cut back. She continues on ASA 81 mg and Atorvastatin. \par Continues to have drainage and redness despite 1 week of PO antibiotics.\par no fever or chills\par S/P left thigh washout  9/6/19\par No exposed graft. \par Wound cultures grew Enterobacter for which she is on Augmentin.\par Has left thigh wound VAC [de-identified] : Left thigh wound almost healed with small scab in mid portion. Left lower leg wound healing well\par She is without complaints

## 2019-11-14 NOTE — ASSESSMENT
[FreeTextEntry1] : 59 year old female with Hx of spider bite in L leg in 2017 that resulted in a non-healing wound. The wound has begun to heal since she had bypass.\par Now with left thigh incision infection following left fem-pop bypass\par S/P Left thigh wound wash out and VAC placement 2 months ago\par Healing well\par -Local wound care for lower leg wound\par -Smoking cessation advised\par -Wound check in 6 weeks.

## 2020-01-23 ENCOUNTER — APPOINTMENT (OUTPATIENT)
Dept: VASCULAR SURGERY | Facility: CLINIC | Age: 60
End: 2020-01-23
Payer: MEDICAID

## 2020-01-23 VITALS
SYSTOLIC BLOOD PRESSURE: 151 MMHG | OXYGEN SATURATION: 98 % | DIASTOLIC BLOOD PRESSURE: 92 MMHG | RESPIRATION RATE: 16 BRPM | HEIGHT: 66 IN | TEMPERATURE: 98 F | HEART RATE: 78 BPM

## 2020-01-23 VITALS — DIASTOLIC BLOOD PRESSURE: 86 MMHG | SYSTOLIC BLOOD PRESSURE: 136 MMHG

## 2020-01-23 PROCEDURE — 99213 OFFICE O/P EST LOW 20 MIN: CPT

## 2020-01-23 NOTE — PHYSICAL EXAM
[JVD] : no jugular venous distention  [Carotid Bruits] : no carotid bruits [Normal Breath Sounds] : Normal breath sounds [1+] : right 1+ [2+] : left 2+ [Ankle Swelling (On Exam)] : present [Ankle Swelling On The Left] : of the left ankle [Ankle Swelling On The Right] : mild [Varicose Veins Of Lower Extremities] : not present [] : not present [No Rash or Lesion] : No rash or lesion [de-identified] : Well appearing [FreeTextEntry1] : Palpable pedal pulses\par left medial thigh wound well healed. \par Left anterior shin wound granulating well

## 2020-01-23 NOTE — ASSESSMENT
[FreeTextEntry1] : 59 year old female with Hx of spider bite in L leg in 2017 that resulted in a non-healing wound. The wound has begun to heal since she had bypass.\par -Smoking cessation reinforced\par -Will obtain left fem pop graft duplex and carotid duplex\par -Return in 6 months if studies are normal

## 2020-01-23 NOTE — HISTORY OF PRESENT ILLNESS
[FreeTextEntry1] : 60 y/o female S/P L prox SFA to AK pop bypass with 8 MM ringed PTFE 7/19/19 and ulcer to lateral aspect of distal LLE. She had been using Gillian on her wound but ran out of topical application and would like a different cream. The wound has erythema around the incision with a 1 cm x 0.2 mm dehiscence. There is no drainage. Erythema extends medially to posterior thigh. She states there is pain in her thigh and it hurts to change clothes. She continues to smoke, but has significantly cut back. She continues on ASA 81 mg and Atorvastatin. \par Continues to have drainage and redness despite 1 week of PO antibiotics.\par no fever or chills\par S/P left thigh washout  9/6/19\par No exposed graft. \par Wound cultures grew Enterobacter for which she is on Augmentin.\par Has left thigh wound VAC [de-identified] : Left thigh wound healed\par Left shin almost healed with small scab in mid portion.\par She is without complaints\par Continues to smoke

## 2020-02-03 ENCOUNTER — APPOINTMENT (OUTPATIENT)
Dept: VASCULAR SURGERY | Facility: CLINIC | Age: 60
End: 2020-02-03
Payer: COMMERCIAL

## 2020-02-03 PROCEDURE — 93926 LOWER EXTREMITY STUDY: CPT

## 2020-02-03 PROCEDURE — 93880 EXTRACRANIAL BILAT STUDY: CPT

## 2020-06-09 ENCOUNTER — APPOINTMENT (OUTPATIENT)
Dept: DERMATOLOGY | Facility: CLINIC | Age: 60
End: 2020-06-09

## 2020-10-01 ENCOUNTER — APPOINTMENT (OUTPATIENT)
Dept: VASCULAR SURGERY | Facility: CLINIC | Age: 60
End: 2020-10-01
Payer: COMMERCIAL

## 2020-10-01 VITALS
OXYGEN SATURATION: 98 % | SYSTOLIC BLOOD PRESSURE: 148 MMHG | WEIGHT: 212 LBS | BODY MASS INDEX: 34.07 KG/M2 | HEIGHT: 66 IN | DIASTOLIC BLOOD PRESSURE: 82 MMHG | TEMPERATURE: 97.7 F

## 2020-10-01 PROCEDURE — 99213 OFFICE O/P EST LOW 20 MIN: CPT

## 2020-10-01 PROCEDURE — 93926 LOWER EXTREMITY STUDY: CPT

## 2020-10-01 NOTE — PHYSICAL EXAM
[JVD] : no jugular venous distention  [Carotid Bruits] : no carotid bruits [Normal Breath Sounds] : Normal breath sounds [1+] : right 1+ [2+] : left 2+ [Ankle Swelling (On Exam)] : present [Ankle Swelling On The Left] : of the left ankle [Ankle Swelling On The Right] : mild [Varicose Veins Of Lower Extremities] : not present [] : not present [No Rash or Lesion] : No rash or lesion [de-identified] : Well appearing [FreeTextEntry1] : Palpable pedal pulses\par left medial thigh wound well healed. \par Left anterior shin wound granulating well

## 2020-10-01 NOTE — ASSESSMENT
[FreeTextEntry1] : 59 year old female with Hx of spider bite in L leg in 2017 that resulted in a non-healing wound. The wound has begun to heal since she had bypass.\par Arterial duplex performed today shows widely patent left femoral- popliteal bypass without stenosis\par -Smoking cessation reinforced\par -Return in 6 months for graft surveillance US

## 2020-10-01 NOTE — HISTORY OF PRESENT ILLNESS
[FreeTextEntry1] : 58 y/o female S/P L prox SFA to AK pop bypass with 8 MM ringed PTFE 7/19/19 and ulcer to lateral aspect of distal LLE. She had been using Gillian on her wound but ran out of topical application and would like a different cream. The wound has erythema around the incision with a 1 cm x 0.2 mm dehiscence. There is no drainage. Erythema extends medially to posterior thigh. She states there is pain in her thigh and it hurts to change clothes. She continues to smoke, but has significantly cut back. She continues on ASA 81 mg and Atorvastatin. \par Continues to have drainage and redness despite 1 week of PO antibiotics.\par no fever or chills\par S/P left thigh washout  9/6/19\par No exposed graft. \par Wound cultures grew Enterobacter for which she is on Augmentin.\par Has left thigh wound VAC [de-identified] : Left thigh wound healed\par Left shin wound clean, managed by her wound care MD\par She is without complaints\par Continues to smoke

## 2020-12-15 PROBLEM — Z87.09 HISTORY OF SORE THROAT: Status: RESOLVED | Noted: 2017-08-22 | Resolved: 2020-12-15

## 2021-10-06 PROBLEM — I10 ESSENTIAL HYPERTENSION: Status: ACTIVE | Noted: 2019-08-01

## 2021-12-17 NOTE — PATIENT PROFILE ADULT - JOB HELP
[address] [facility] Adult  Hospitalist Group                                                                                          Hospitalist Progress Note  Siva Torres MD  Answering service: [phone] OR [phone] from in house phone        Date of Service:  2021  NAME:  Shawn Mann  :  1950  MRN:  [id number]      Admission Summary:     Patient with recent admission at [facility] and discharged with diagnosis of acute diastolic heart failure. Patient went home on 1 L of oxygen. Patient presented to heart failure clinic  and found to be hypoxic with increased work of breathing and subsequently sent to the emergency room for further evaluation. CT of the chest shows bilateral pulmonary infiltrates which was unchanged from his recent chest CT . Patient has seen pulmonology once outpatient for further evaluation and further ongoing investigation for that. Interval history / Subjective:   Pt feels much improved today after his LHC and PCI yesterday   SOB much better. Procalcitonin is lower. Assessment & Plan:     Acute on chronic hypoxic respiratory failure -  (1lNC baseline) likely CHF vs. CAP or other   -Pulmonology following  - On linezolid and cefepime started . DC linezolid as MRSA screen negative. DC cefepime and start Augmentin. procal decreasing (likely false elevated from CKD)  -Sputum cultures pending, not able to produce much    Hypotension - responded to albumin previously. Monitor and if symptomatic may need additional bolus     Acute on chronic diastolic congestive heart failure - NYHA class IV on admit  -Discontinued Norvasc due to leg edema, discontinued lisinopril due to BIPIN  -Previous echo at [facility] shows EF of 45 to 50%, repeat echo this admission shows EF 55 to 60%  -On hydralazine, Coreg.  Holding bumex today due to recent LHC, also normal LVEDP on RHC  -Advanced heart failure team following    NSTEMI   -s/p heparin gtt   - LHC  with ADARSH to RCA - Contineu coreg, ASA, imdur, hydralazine, statin     UTI   -Urine culture on 12/9 growing Klebsiella  -Completed antibiotics with cefdinir/cefepime     CKD stage IV  -Baseline serum creatinine 2.5-3 per nephrology  -Mild bump in creatinine due to diuresis  -previous hospitalist discussed risk of MEL with cardiac catheterization, with patient and wife  -Nephrology following  -Monitor post LHC, and fluids per renal     Hypertension  -Continue hydralazine, coreg and nitrates    Diabetes  -Lantus resumed, up titrate today   -Blood sugars improving, continue sliding scale insulin coverage    Dyslipidemia  -Continue statin    Hypothyroidism  -Continue Synthroid    Code status: Full  DVT prophylaxis: SCDs    Care Plan discussed with: Patient/Family  Anticipated Disposition: Home w/Family  Anticipated Discharge: Greater than 48 hours     Hospital Problems  Date Reviewed: 12/9/2021          Codes Class Noted POA    CHF (congestive heart failure) ([facility]) ICD-10-CM: I50.9  ICD-9-CM: 428.0  12/9/2021 Unknown        Renal insufficiency ICD-10-CM: N28.9  ICD-9-CM: 593.9  Unknown Yes        Chronic obstructive pulmonary disease ([facility] [address] ICD-10-CM: J44.9  ICD-9-CM: 328  Unknown Yes        Diabetes ([facility] [address] ICD-10-CM: E11.9  ICD-9-CM: 250.00  Unknown Yes        Essential hypertension ICD-10-CM: I10  ICD-9-CM: 401.9  Unknown Yes        Coronary arteriosclerosis ICD-10-CM: I25.10  ICD-9-CM: 414.00  7/3/2018 Yes                Review of Systems:   A comprehensive review of systems was negative except for that written in the HPI. Vital Signs:    Last 24hrs VS reviewed since prior progress note.  Most recent are:  Visit Vitals  BP (!) 118/46 (BP 1 Location: Right upper arm, BP Patient Position: Sitting)   Pulse 78   Temp 97.3 °F (36.3 °C)   Resp 22   Ht 5' 5\" (1.651 m)   Wt 60.8 kg (134 lb 0.6 oz)   SpO2 96%   BMI 22.31 kg/m²         Intake/Output Summary (Last 24 hours) at 12/17/2021 1600  Last data filed at 12/17/2021 1509  Gross per 24 hour   Intake 700 ml   Output 950 ml   Net -250 ml        Physical Examination:     I had a face to face encounter with this patient and independently examined them on 12/17/2021 as outlined below:          Constitutional:  No acute distress, cooperative, pleasant, awake in chair    ENT:  Oral mucosa moist, oropharynx benign. Resp:  CTA bilaterally. No wheezing/rhonchi/rales. No accessory muscle use   CV:  Regular rhythm, normal rate, no murmurs, gallops, rubs    GI:  Soft, non distended, non tender. normoactive bowel sounds, no hepatosplenomegaly     Musculoskeletal:  No edema, warm, 2+ pulses throughout    Neurologic:  Moves all extremities. Hard of hearing. Answering questions appropriately             Data Review:    Review and/or order of clinical lab test  Review and/or order of tests in the radiology section of CPT  Review and/or order of tests in the medicine section of CPT      Labs:     Recent Labs     12/17/21 0442 12/16/21 0345   WBC 8.4 9.9   HGB 9.3* 9.3*   HCT 29.4* 29.2*   * 119*     Recent Labs     12/17/21  0442 12/16/21  0345 12/15/21  0529   * 132* 133*   K 4.8 4.4 4.3   CL 95* 95* 96*   CO2 29 30 32   BUN 88* 83* 85*   CREA 3.20* 3.05* 2.85*   * 332* 71   CA 8.7 8.3* 8.7   MG 2.8* 2.6* 2.4   PHOS 4.1  --   --      No results for input(s): ALT, AP, TBIL, TBILI, TP, ALB, GLOB, GGT, AML, LPSE in the last 72 hours. No lab exists for component: SGOT, GPT, AMYP, HLPSE  Recent Labs     12/17/21 0442 12/16/21  0345   APTT 27.1 30.6      No results for input(s): FE, TIBC, PSAT, FERR in the last 72 hours. No results found for: FOL, RBCF   No results for input(s): PH, PCO2, PO2 in the last 72 hours. No results for input(s): CPK, CKNDX, TROIQ in the last 72 hours.     No lab exists for component: CPKMB  Lab Results   Component Value Date/Time    Cholesterol, total 76 12/10/2021 02:59 PM    HDL Cholesterol 28 12/10/2021 02:59 PM    LDL, calculated 25.4 12/10/2021 02:59 PM Triglyceride 113 12/10/2021 02:59 PM    CHOL/HDL Ratio 2.7 12/10/2021 02:59 PM     Lab Results   Component Value Date/Time    Glucose (POC) 219 (H) 12/17/2021 12:42 PM    Glucose (POC) 363 (H) 12/17/2021 06:57 AM    Glucose (POC) 409 (H) 12/16/2021 09:41 PM    Glucose (POC) 411 (H) 12/16/2021 09:32 PM    Glucose (POC) 406 (H) 12/16/2021 09:28 PM     Lab Results   Component Value Date/Time    Color YELLOW/STRAW 12/09/2021 05:26 AM    Appearance CLEAR 12/09/2021 05:26 AM    Specific gravity 1.008 12/09/2021 05:26 AM    pH (UA) 6.5 12/09/2021 05:26 AM    Protein 30 (A) 12/09/2021 05:26 AM    Glucose Negative 12/09/2021 05:26 AM    Ketone Negative 12/09/2021 05:26 AM    Bilirubin Negative 12/09/2021 05:26 AM    Urobilinogen 0.2 12/09/2021 05:26 AM    Nitrites Negative 12/09/2021 05:26 AM    Leukocyte Esterase SMALL (A) 12/09/2021 05:26 AM    Epithelial cells FEW 12/09/2021 05:26 AM    Bacteria 4+ (A) 12/09/2021 05:26 AM    WBC 20-50 12/09/2021 05:26 AM    RBC 0-5 12/09/2021 05:26 AM         Medications Reviewed:     Current Facility-Administered Medications   Medication Dose Route Frequency    0.9% sodium chloride infusion  75 mL/hr IntraVENous CONTINUOUS    cephALEXin (KEFLEX) capsule 500 mg  500 mg Oral Q8H    hydrALAZINE (APRESOLINE) tablet 50 mg  50 mg Oral TID    ondansetron (ZOFRAN) injection 4 mg  4 mg IntraVENous Q6H PRN    isosorbide mononitrate ER (IMDUR) tablet 60 mg  60 mg Oral BID    dextrose 20% infusion   IntraVENous CONTINUOUS    carvediloL (COREG) tablet 12.5 mg  12.5 mg Oral Q12H    insulin lispro (HUMALOG) injection   SubCUTAneous AC&HS    glucose chewable tablet 16 g  4 Tablet Oral PRN    dextrose (D50W) injection syrg 12.5-25 g  12.5-25 g IntraVENous PRN    glucagon (GLUCAGEN) injection 1 mg  1 mg IntraMUSCular PRN    mylanta/viscous lidocaine (GI COCKTAIL)  40 mL Oral Q6H PRN    aspirin delayed-release tablet 81 mg  81 mg Oral QHS    guaiFENesin ER (MUCINEX) tablet 600 mg  600 mg Oral Q12H    benzonatate (TESSALON) capsule 100 mg  100 mg Oral TID PRN    insulin glargine (LANTUS) injection 15 Units  15 Units SubCUTAneous PCD    levothyroxine (SYNTHROID) tablet 100 mcg  100 mcg Oral ACB    arformoterol 15 mcg/budesonide 0.5 mg neb solution   Nebulization BID RT    nitroglycerin (NITROSTAT) tablet 0.4 mg  0.4 mg SubLINGual Q5MIN PRN    clopidogreL (PLAVIX) tablet 75 mg  75 mg Oral DAILY    rosuvastatin (CRESTOR) tablet 20 mg  20 mg Oral QHS    magnesium oxide (MAG-OX) tablet 200 mg  200 mg Oral EVERY OTHER DAY    acetaminophen (TYLENOL) tablet 650 mg  650 mg Oral Q4H PRN    acetaminophen (TYLENOL) tablet 650 mg  650 mg Oral Q6H    sodium chloride (NS) flush 5-10 mL  5-10 mL IntraVENous PRN     ______________________________________________________________________  EXPECTED LENGTH OF STAY: 4d 2h  ACTUAL LENGTH OF STAY:          8                 Claudetta Grange, MD no

## 2022-04-29 NOTE — DISCHARGE NOTE ADULT - CARE PLAN
[Dear  ___] : Dear  [unfilled], [I had the pleasure of seeing your patient today.] : I had the pleasure of seeing your patient today Principal Discharge DX:	Ulcer of left lower extremity with fat layer exposed  Goal:	antibiotic therapy for several weeks end date approx 1/8  Assessment and plan of treatment:	Osteomyelitis -  antibiotic therapy  Secondary Diagnosis:	Allergic reaction, initial encounter  Secondary Diagnosis:	Diabetes  Secondary Diagnosis:	Other hyperlipidemia Principal Discharge DX:	Other acute osteomyelitis of left foot  Goal:	antibiotic therapy for several weeks end date approx 1/8  Assessment and plan of treatment:	Needs to complete meropenem till 1/8/19. and follow up with ID on dec 11 /2018  Secondary Diagnosis:	Allergic reaction, initial encounter  Assessment and plan of treatment:	not while inpatient. ID sugegsted to continue meropenem.  Secondary Diagnosis:	Other hyperlipidemia Principal Discharge DX:	Ulcer of left lower extremity with fat layer exposed  Goal:	antibiotic therapy for several weeks end date approx 1/8  Assessment and plan of treatment:	Needs to complete meropenem till 1/8/19. and follow up with ID on dec 11 /2018  Secondary Diagnosis:	Allergic reaction, initial encounter  Assessment and plan of treatment:	not while inpatient. ID sugegsted to continue meropenem.  Secondary Diagnosis:	Other hyperlipidemia

## 2022-10-25 NOTE — ASU PATIENT PROFILE, ADULT - TOBACCO USE
Topical Retinoid counseling:  Patient advised to apply a pea-sized amount only at bedtime and wait 30 minutes after washing their face before applying.  If too drying, patient may add a non-comedogenic moisturizer. The patient verbalized understanding of the proper use and possible adverse effects of retinoids.  All of the patient's questions and concerns were addressed. Current every day smoker

## 2022-12-14 NOTE — DISCHARGE NOTE NURSING/CASE MANAGEMENT/SOCIAL WORK - NSSCCARECORD_GEN_ALL_CORE
Transition Communication Hand-off for Care Transitions to Next Level of Care Provider    Name: Vashti Villegas  : 1979  MRN #: [id number]  Primary Care Provider: 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.00]  Admit Date/Time: 2022  2:26 PM  Discharge Date: 22  Payor Source: Payor: BCBS / Plan: [place]BS [place] / Product Type: Indemnity /       Discharge Plan: home with [facility]     Discharge Needs Assessment:  Needs    Flowsheet Row Most Recent Value   Equipment Currently Used at Home none   # of Referrals Placed by [facility], External Care Coordination      Follow-up plan:    Future Appointments   Date Time Provider Department Center   2022  1:15 PM Roya Quarles, APRN CNP [facility]       Any outstanding tests or procedures:        Radiology & Cardiology Orders     Future Labs/Procedures Complete By Expires    X-ray Lumbar punc for intrathecal chemo admin ([place] Only)  2022 (Approximate) 3/23/2023        Referrals     Future Labs/Procedures    Home Care Referral     Comments:    Your provider has ordered home health services. If you have not been contacted within 2 days of your discharge please call the inpatient department phone number at [phone] .    Home Infusion Referral     Comments:    [facility]  Phone  [phone]  Fax  [phone]    [facility] [place] services/supplies.            Key Recommendations:      Bety Hanson RN    AVS/Discharge Summary is the source of truth; this is a helpful guide for improved communication of patient story          
[facility]

## 2023-01-18 NOTE — DISCHARGE NOTE ADULT - NS MD DC FALL RISK RISK
For information on Fall & Injury Prevention, visit [email or web address] Purse String (Simple) Text: Given the location of the defect and the characteristics of the surrounding skin a purse string closure was deemed most appropriate.  Undermining was performed circumferentially around the surgical defect.  A purse string suture was then placed and tightened.

## 2023-01-19 NOTE — PATIENT PROFILE ADULT - NSPROPTRIGHTNOTIFY_GEN_A_NUR
GERSON attempted to call Cat with [facility] Phone: [phone]  she was out for lunch. GERSON was told to call back in 30 minutes. GERSON will follow.     GIO Mckinley  [phone]     01/19/23 1317   Post-Acute Status   Post-Acute Authorization Placement   Discharge Plan   Discharge Plan A Skilled Nursing Facility        declines

## 2023-02-16 NOTE — ED ADULT TRIAGE NOTE - CHIEF COMPLAINT QUOTE
-- DO NOT REPLY / DO NOT REPLY ALL --  -- Message is from Engagement Center Operations (ECO) --    General Patient Message: Patient called in and need a call back to have follow up  visit scheduled with you. Patient had a cancelled appointment scheduled with you on 11/28/2022.     Caller Information       Type Contact Phone/Fax    02/16/2023 02:32 PM CST Phone (Incoming) Ernesto Wellington (Self) [phone] (M)        Alternative phone number: none     Can a detailed message be left? Yes    Message Turnaround:     Is it Working Hours? Yes - Working Hours     [place]:    Please give this turnaround time to the caller:   \"This message will be sent to [state Provider's name]. The clinical team will fulfill your request as soon as they review your message.\"                 Patient BIBA, hx of osteomyelitis, as per EMS visiting nurse stated patient has had fevers and "new infection" to left ankle, sent to critical care for code sepsis

## 2023-05-12 NOTE — PATIENT PROFILE ADULT - NUMBER OF YRS
40 Protopic Pregnancy And Lactation Text: This medication is Pregnancy Category C. It is unknown if this medication is excreted in breast milk when applied topically.

## 2023-05-13 ENCOUNTER — APPOINTMENT (OUTPATIENT)
Dept: OBGYN | Facility: CLINIC | Age: 63
End: 2023-05-13

## 2023-05-15 ENCOUNTER — EMERGENCY (EMERGENCY)
Facility: HOSPITAL | Age: 63
LOS: 1 days | Discharge: HOME | End: 2023-05-15
Attending: EMERGENCY MEDICINE
Payer: MEDICAID

## 2023-05-15 VITALS
RESPIRATION RATE: 16 BRPM | SYSTOLIC BLOOD PRESSURE: 196 MMHG | DIASTOLIC BLOOD PRESSURE: 85 MMHG | OXYGEN SATURATION: 97 % | HEART RATE: 102 BPM | WEIGHT: 262.57 LBS | TEMPERATURE: 98 F

## 2023-05-15 VITALS
RESPIRATION RATE: 17 BRPM | OXYGEN SATURATION: 96 % | HEART RATE: 65 BPM | DIASTOLIC BLOOD PRESSURE: 82 MMHG | TEMPERATURE: 98 F | SYSTOLIC BLOOD PRESSURE: 131 MMHG

## 2023-05-15 DIAGNOSIS — Z96.641 PRESENCE OF RIGHT ARTIFICIAL HIP JOINT: Chronic | ICD-10-CM

## 2023-05-15 DIAGNOSIS — Z95.828 PRESENCE OF OTHER VASCULAR IMPLANTS AND GRAFTS: Chronic | ICD-10-CM

## 2023-05-15 LAB
ALBUMIN SERPL ELPH-MCNC: 3.8 G/DL — SIGNIFICANT CHANGE UP (ref 3.3–5.2)
ALP SERPL-CCNC: 101 U/L — SIGNIFICANT CHANGE UP (ref 40–120)
ALT FLD-CCNC: 10 U/L — SIGNIFICANT CHANGE UP
ANION GAP SERPL CALC-SCNC: 12 MMOL/L — SIGNIFICANT CHANGE UP (ref 5–17)
APPEARANCE UR: ABNORMAL
AST SERPL-CCNC: 14 U/L — SIGNIFICANT CHANGE UP
BACTERIA # UR AUTO: ABNORMAL
BASOPHILS # BLD AUTO: 0.04 K/UL — SIGNIFICANT CHANGE UP (ref 0–0.2)
BASOPHILS NFR BLD AUTO: 0.4 % — SIGNIFICANT CHANGE UP (ref 0–2)
BILIRUB SERPL-MCNC: 0.5 MG/DL — SIGNIFICANT CHANGE UP (ref 0.4–2)
BILIRUB UR-MCNC: NEGATIVE — SIGNIFICANT CHANGE UP
BUN SERPL-MCNC: 13.1 MG/DL — SIGNIFICANT CHANGE UP (ref 8–20)
CALCIUM SERPL-MCNC: 9.7 MG/DL — SIGNIFICANT CHANGE UP (ref 8.4–10.5)
CHLORIDE SERPL-SCNC: 101 MMOL/L — SIGNIFICANT CHANGE UP (ref 96–108)
CO2 SERPL-SCNC: 23 MMOL/L — SIGNIFICANT CHANGE UP (ref 22–29)
COLOR SPEC: YELLOW — SIGNIFICANT CHANGE UP
CREAT SERPL-MCNC: 0.93 MG/DL — SIGNIFICANT CHANGE UP (ref 0.5–1.3)
DIFF PNL FLD: ABNORMAL
EGFR: 69 ML/MIN/1.73M2 — SIGNIFICANT CHANGE UP
EOSINOPHIL # BLD AUTO: 0.22 K/UL — SIGNIFICANT CHANGE UP (ref 0–0.5)
EOSINOPHIL NFR BLD AUTO: 2.4 % — SIGNIFICANT CHANGE UP (ref 0–6)
EPI CELLS # UR: SIGNIFICANT CHANGE UP
GLUCOSE SERPL-MCNC: 121 MG/DL — HIGH (ref 70–99)
GLUCOSE UR QL: NEGATIVE MG/DL — SIGNIFICANT CHANGE UP
HCT VFR BLD CALC: 38.2 % — SIGNIFICANT CHANGE UP (ref 34.5–45)
HGB BLD-MCNC: 13.1 G/DL — SIGNIFICANT CHANGE UP (ref 11.5–15.5)
IMM GRANULOCYTES NFR BLD AUTO: 0.4 % — SIGNIFICANT CHANGE UP (ref 0–0.9)
KETONES UR-MCNC: NEGATIVE — SIGNIFICANT CHANGE UP
LEUKOCYTE ESTERASE UR-ACNC: ABNORMAL
LYMPHOCYTES # BLD AUTO: 1.68 K/UL — SIGNIFICANT CHANGE UP (ref 1–3.3)
LYMPHOCYTES # BLD AUTO: 18.5 % — SIGNIFICANT CHANGE UP (ref 13–44)
MCHC RBC-ENTMCNC: 29.4 PG — SIGNIFICANT CHANGE UP (ref 27–34)
MCHC RBC-ENTMCNC: 34.3 GM/DL — SIGNIFICANT CHANGE UP (ref 32–36)
MCV RBC AUTO: 85.8 FL — SIGNIFICANT CHANGE UP (ref 80–100)
MONOCYTES # BLD AUTO: 0.8 K/UL — SIGNIFICANT CHANGE UP (ref 0–0.9)
MONOCYTES NFR BLD AUTO: 8.8 % — SIGNIFICANT CHANGE UP (ref 2–14)
NEUTROPHILS # BLD AUTO: 6.32 K/UL — SIGNIFICANT CHANGE UP (ref 1.8–7.4)
NEUTROPHILS NFR BLD AUTO: 69.5 % — SIGNIFICANT CHANGE UP (ref 43–77)
NITRITE UR-MCNC: NEGATIVE — SIGNIFICANT CHANGE UP
PH UR: 5 — SIGNIFICANT CHANGE UP (ref 5–8)
PLATELET # BLD AUTO: 189 K/UL — SIGNIFICANT CHANGE UP (ref 150–400)
POTASSIUM SERPL-MCNC: 4.1 MMOL/L — SIGNIFICANT CHANGE UP (ref 3.5–5.3)
POTASSIUM SERPL-SCNC: 4.1 MMOL/L — SIGNIFICANT CHANGE UP (ref 3.5–5.3)
PROT SERPL-MCNC: 6.4 G/DL — LOW (ref 6.6–8.7)
PROT UR-MCNC: 30 MG/DL
RBC # BLD: 4.45 M/UL — SIGNIFICANT CHANGE UP (ref 3.8–5.2)
RBC # FLD: 13.1 % — SIGNIFICANT CHANGE UP (ref 10.3–14.5)
RBC CASTS # UR COMP ASSIST: ABNORMAL /HPF (ref 0–4)
SODIUM SERPL-SCNC: 136 MMOL/L — SIGNIFICANT CHANGE UP (ref 135–145)
SP GR SPEC: 1.01 — SIGNIFICANT CHANGE UP (ref 1.01–1.02)
UROBILINOGEN FLD QL: NEGATIVE MG/DL — SIGNIFICANT CHANGE UP
WBC # BLD: 9.1 K/UL — SIGNIFICANT CHANGE UP (ref 3.8–10.5)
WBC # FLD AUTO: 9.1 K/UL — SIGNIFICANT CHANGE UP (ref 3.8–10.5)
WBC UR QL: >50 /HPF (ref 0–5)

## 2023-05-15 PROCEDURE — 76856 US EXAM PELVIC COMPLETE: CPT

## 2023-05-15 PROCEDURE — 76856 US EXAM PELVIC COMPLETE: CPT | Mod: 26

## 2023-05-15 PROCEDURE — 96375 TX/PRO/DX INJ NEW DRUG ADDON: CPT

## 2023-05-15 PROCEDURE — 85025 COMPLETE CBC W/AUTO DIFF WBC: CPT

## 2023-05-15 PROCEDURE — 99284 EMERGENCY DEPT VISIT MOD MDM: CPT

## 2023-05-15 PROCEDURE — 99284 EMERGENCY DEPT VISIT MOD MDM: CPT | Mod: 25

## 2023-05-15 PROCEDURE — 81001 URINALYSIS AUTO W/SCOPE: CPT

## 2023-05-15 PROCEDURE — 80053 COMPREHEN METABOLIC PANEL: CPT

## 2023-05-15 PROCEDURE — 87077 CULTURE AEROBIC IDENTIFY: CPT

## 2023-05-15 PROCEDURE — 87186 SC STD MICRODIL/AGAR DIL: CPT

## 2023-05-15 PROCEDURE — 87086 URINE CULTURE/COLONY COUNT: CPT

## 2023-05-15 PROCEDURE — 36415 COLL VENOUS BLD VENIPUNCTURE: CPT

## 2023-05-15 PROCEDURE — 96374 THER/PROPH/DIAG INJ IV PUSH: CPT

## 2023-05-15 RX ORDER — CEPHALEXIN 500 MG
1 CAPSULE ORAL
Qty: 14 | Refills: 0
Start: 2023-05-15 | End: 2023-05-21

## 2023-05-15 RX ORDER — CEFTRIAXONE 500 MG/1
1000 INJECTION, POWDER, FOR SOLUTION INTRAMUSCULAR; INTRAVENOUS ONCE
Refills: 0 | Status: COMPLETED | OUTPATIENT
Start: 2023-05-15 | End: 2023-05-15

## 2023-05-15 RX ORDER — CEFTRIAXONE 500 MG/1
1000 INJECTION, POWDER, FOR SOLUTION INTRAMUSCULAR; INTRAVENOUS ONCE
Refills: 0 | Status: DISCONTINUED | OUTPATIENT
Start: 2023-05-15 | End: 2023-05-15

## 2023-05-15 RX ORDER — ACETAMINOPHEN 500 MG
1000 TABLET ORAL ONCE
Refills: 0 | Status: COMPLETED | OUTPATIENT
Start: 2023-05-15 | End: 2023-05-15

## 2023-05-15 RX ADMIN — CEFTRIAXONE 1000 MILLIGRAM(S): 500 INJECTION, POWDER, FOR SOLUTION INTRAMUSCULAR; INTRAVENOUS at 08:50

## 2023-05-15 RX ADMIN — Medication 400 MILLIGRAM(S): at 03:58

## 2023-05-15 NOTE — ED ADULT NURSE NOTE - OBJECTIVE STATEMENT
PT is A&Ox3, PT reports to ED with complaints of lower abdominal pain, PT states that the pain started 3 days ago along with vaginal bleeding. PT reports pain while urinating without discharge.  PT denies any NVD, Fever/chills, Pt denies back pain or flank tenderness.

## 2023-05-15 NOTE — ED PROVIDER NOTE - CARE PLAN
Principal Discharge DX:	Pelvic pain   1 Principal Discharge DX:	Urinary tract infection  Secondary Diagnosis:	Pelvic pain

## 2023-05-15 NOTE — ED PROVIDER NOTE - CLINICAL SUMMARY MEDICAL DECISION MAKING FREE TEXT BOX
patient with past medical history of hypertension hyperlipidemia presents to ED with abdominal pain.  Labs and ultrasound ordered.

## 2023-05-15 NOTE — ED PROVIDER NOTE - OBJECTIVE STATEMENT
63-year-old female patient with past medical history of hypertension hyperlipidemia presents to ED with suprapubic abdominal pain today.  Patient reports that she had similar pain 3 days ago with associated vaginal bleeding.  Last time patient blood before a few days ago was many years ago.   she does not currently have an OB/GYN.  she also reports dysuria.  She denies nausea, vomiting, diarrhea, back pain, chest pain, shortness of breath, cough, vaginal discharge, hematuria, or any other complaints.

## 2023-05-15 NOTE — ED PROVIDER NOTE - PROGRESS NOTE DETAILS
Patient signed out to myself and Dr. Horn by overnight team.   Urinalysis positive. Prescription for Keflex sent to [facility] in [place] on [address].  Patient was unable to tolerate pelvic ultrasound. Images obtained limited by bowel gas.  Discussed with patient importance of following up with OB/GYN  new vaginal bleeding in the setting of pelvic pain. Discussed with her that she will need an ultrasound with them to evaluate for possible malignancy or other abnormalities.  Patient reports that she had made an appointment with OBGYN for 5/26/2023 for her pelvic pain. Discharge instructions and strict return precautions discussed with patient.  She is medically stable at this time for discharge and outpatient follow-up. Aida Dunlap MD PGY-2

## 2023-05-15 NOTE — ED PROVIDER NOTE - CARE PROVIDER_API CALL
Msisy Boyle)  Obstetrics and Gynecology  [address] [place] [id number]  Phone: [phone]  Fax: [phone]  Follow Up Time:

## 2023-05-15 NOTE — ED PROVIDER NOTE - PATIENT PORTAL LINK FT
You can access the FollowMyHealth Patient Portal offered by [facility] by registering at the following website: [email or web address]. By joining [employer]’s FollowMyHealth portal, you will also be able to view your health information using other applications (apps) compatible with our system.

## 2023-05-15 NOTE — ED PROVIDER NOTE - NSFOLLOWUPINSTRUCTIONS_ED_ALL_ED_FT
Postmenopausal Bleeding    WHAT YOU NEED TO KNOW:    What do I need to know about postmenopausal bleeding? Postmenopausal bleeding is bleeding that occurs after menopause. A woman who has not had a period for a full year after the age of 40 is considered to be in menopause. Postmenopausal bleeding may range from spotting to very heavy bleeding.    What causes postmenopausal bleeding?    Thinning of the endometrium (lining of the uterus) called endometrial atrophy    Polyps (noncancerous growths) that develop on the inner wall of your uterus or cervix    Hormone replacement therapy    Abnormal thickening of the endometrium called endometrial hyperplasia    Tamoxifen (medicine used to treat breast cancer)    Cervical, endometrial, or uterine cancer  How is postmenopausal bleeding diagnosed? Your healthcare provider will ask about medical conditions that you have, and that run in your family. He or she will also do a pelvic exam to check for problems with your cervix, uterus, and ovaries. You may also need any of the following:    An ultrasound uses sound waves to show pictures of your uterus on a monitor. Your healthcare provider may place saline fluid into your uterus with a catheter. The fluid helps show more detail in the ultrasound pictures of your uterus.    Endometrial biopsy is used to collect a sample of cells from the inside of your uterus to be tested for cancer.    Hysteroscopy is a procedure that is done to look inside your uterus. A small scope with a light and camera is placed into your vagina and cervix. Liquid or gas may be put through the scope to help healthcare providers see better.    Dilation and curettage (D&C) is a procedure to remove tissue from the lining of your uterus. The tissue is sent to a lab for tests.  How is postmenopausal bleeding treated? Treatment depends on the cause of your postmenopausal bleeding. If you have polyps, you may need surgery to remove them. Endometrial atrophy can be treated with medicines. Endometrial hyperplasia may be treated with progestin hormone therapy. Surgery to remove your uterus will be needed if you have endometrial or uterine cancer. Your fallopian tubes, ovaries, and nearby lymph nodes may also be removed.    When should I call my doctor?    You continue to have vaginal bleeding, even with treatment.    You have pain in your abdomen or pelvis.    You have questions or concerns about your condition or care.  CARE AGREEMENT:    You have the right to help plan your care. Learn about your health condition and how it may be treated. Discuss treatment options with your healthcare providers to decide what care you want to receive. You always have the right to refuse treatment. - Medications have been sent to your pharmacy. Please take as prescribed.   - SEEK IMMEDIATE MEDICAL CARE IF YOU HAVE ANY OF THE FOLLOWING SYMPTOMS: severe back or abdominal pain, fever, inability to keep fluids or medicine down, dizziness/lightheadedness, or a change in mental status.    Urinary Tract Infection    A urinary tract infection (UTI) is an infection of any part of the urinary tract, which includes the kidneys, ureters, bladder, and urethra. Risk factors include ignoring your need to urinate, wiping back to front if female, being an uncircumcised male, and having diabetes or a weak immune system. Symptoms include frequent urination, pain or burning with urination, foul smelling urine, cloudy urine, pain in the lower abdomen, blood in the urine, and fever. If you were prescribed an antibiotic medicine, take it as told by your health care provider. Do not stop taking the antibiotic even if you start to feel better.          Postmenopausal Bleeding    WHAT YOU NEED TO KNOW:    What do I need to know about postmenopausal bleeding? Postmenopausal bleeding is bleeding that occurs after menopause. A woman who has not had a period for a full year after the age of 40 is considered to be in menopause. Postmenopausal bleeding may range from spotting to very heavy bleeding.    What causes postmenopausal bleeding?    Thinning of the endometrium (lining of the uterus) called endometrial atrophy    Polyps (noncancerous growths) that develop on the inner wall of your uterus or cervix    Hormone replacement therapy    Abnormal thickening of the endometrium called endometrial hyperplasia    Tamoxifen (medicine used to treat breast cancer)    Cervical, endometrial, or uterine cancer  How is postmenopausal bleeding diagnosed? Your healthcare provider will ask about medical conditions that you have, and that run in your family. He or she will also do a pelvic exam to check for problems with your cervix, uterus, and ovaries. You may also need any of the following:    An ultrasound uses sound waves to show pictures of your uterus on a monitor. Your healthcare provider may place saline fluid into your uterus with a catheter. The fluid helps show more detail in the ultrasound pictures of your uterus.    Endometrial biopsy is used to collect a sample of cells from the inside of your uterus to be tested for cancer.    Hysteroscopy is a procedure that is done to look inside your uterus. A small scope with a light and camera is placed into your vagina and cervix. Liquid or gas may be put through the scope to help healthcare providers see better.    Dilation and curettage (D&C) is a procedure to remove tissue from the lining of your uterus. The tissue is sent to a lab for tests.  How is postmenopausal bleeding treated? Treatment depends on the cause of your postmenopausal bleeding. If you have polyps, you may need surgery to remove them. Endometrial atrophy can be treated with medicines. Endometrial hyperplasia may be treated with progestin hormone therapy. Surgery to remove your uterus will be needed if you have endometrial or uterine cancer. Your fallopian tubes, ovaries, and nearby lymph nodes may also be removed.    When should I call my doctor?    You continue to have vaginal bleeding, even with treatment.    You have pain in your abdomen or pelvis.    You have questions or concerns about your condition or care.  CARE AGREEMENT:    You have the right to help plan your care. Learn about your health condition and how it may be treated. Discuss treatment options with your healthcare providers to decide what care you want to receive. You always have the right to refuse treatment.

## 2023-05-15 NOTE — ED PROVIDER NOTE - PRO INTERPRETER NEED 2
Problem: Infant Inpatient Plan of Care  Goal: Plan of Care Review  Outcome: Ongoing, Progressing     Problem: Oral Nutrition ()  Goal: Effective Oral Intake  Outcome: Ongoing, Progressing     Problem: Infant-Parent Attachment ()  Goal: Demonstration of Attachment Behaviors  Outcome: Ongoing, Progressing     VSS. Voiding and stooling well. Assist needed with breastfeeding. Nipples are flat, shells and shields provided. Baby has difficulty latching on. Hand expression performed for feedings. During night, mother wanted to try pump. [employer] pump, tubing, collections containers and labels brought to bedside.  Discussed proper pump setting of initiation phase.  Instructed on proper usage of pump and to adjust suction according to maximum comfort level.  Verified appropriate flange fit.  Educated on the frequency and duration of pumping in order to promote and maintain a full milk supply.  Hands on pumping technique reviewed.  Encouraged hand expression after pumping.  Instructed on cleaning of breast pump parts.  Written instructions also given.  Pt verbalized understanding and appropriate recall for proper milk handling, collection, labeling, storage and transportation. First pumping session, pt able to get 30 ml of colostrum, fed to baby via spoon. Formula was given to baby during the night via spoon as well. Mother educated to continue to attempt to latch and breastfeed baby first, then spoonfeed baby with expressed breastmilk. Parents agreed and understood plan.    English

## 2023-05-15 NOTE — ED PROVIDER NOTE - PHYSICAL EXAMINATION
Gen: Well appearing in NAD  Head: NC/AT  Neck: trachea midline  Card: regular rate and rhythm  Resp:  CTAB  Abd: soft, suprapubic tenderness  Ext: no deformities above reported baseline  Neuro:  A&O, no motor or sensory deficits above reported baseline  Skin:  Warm and dry as visualized  Psych:  Normal affect and mood

## 2023-05-15 NOTE — ED PROVIDER NOTE - ATTENDING CONTRIBUTION TO CARE
63y F w/ hx HTN, HLD, presents for pelvic pain. Pt reports having intermittent suprapubic discomfort over the past 3-4 weeks, worse over the past few days. Also had some vaginal spotting 4 days ago which self-resolved. No fever, vomiting, diarrhea, urinary complaints. On exam, pt with mild suprapubic TTP. Well appearing and nontoxic. Check labs, urine, pelvic sono. Plan for discharge with GYN f/u if no acute findings on workup.

## 2023-05-15 NOTE — ED ADULT NURSE NOTE - NSFALLHARMRISKINTERV_ED_ALL_ED

## 2023-05-17 LAB
-  AMIKACIN: SIGNIFICANT CHANGE UP
-  AMOXICILLIN/CLAVULANIC ACID: SIGNIFICANT CHANGE UP
-  AMPICILLIN/SULBACTAM: SIGNIFICANT CHANGE UP
-  AMPICILLIN: SIGNIFICANT CHANGE UP
-  AZTREONAM: SIGNIFICANT CHANGE UP
-  CEFAZOLIN: SIGNIFICANT CHANGE UP
-  CEFEPIME: SIGNIFICANT CHANGE UP
-  CEFOXITIN: SIGNIFICANT CHANGE UP
-  CEFTRIAXONE: SIGNIFICANT CHANGE UP
-  CIPROFLOXACIN: SIGNIFICANT CHANGE UP
-  ERTAPENEM: SIGNIFICANT CHANGE UP
-  GENTAMICIN: SIGNIFICANT CHANGE UP
-  IMIPENEM: SIGNIFICANT CHANGE UP
-  LEVOFLOXACIN: SIGNIFICANT CHANGE UP
-  MEROPENEM: SIGNIFICANT CHANGE UP
-  NITROFURANTOIN: SIGNIFICANT CHANGE UP
-  PIPERACILLIN/TAZOBACTAM: SIGNIFICANT CHANGE UP
-  TOBRAMYCIN: SIGNIFICANT CHANGE UP
-  TRIMETHOPRIM/SULFAMETHOXAZOLE: SIGNIFICANT CHANGE UP
CULTURE RESULTS: SIGNIFICANT CHANGE UP
METHOD TYPE: SIGNIFICANT CHANGE UP
ORGANISM # SPEC MICROSCOPIC CNT: SIGNIFICANT CHANGE UP
ORGANISM # SPEC MICROSCOPIC CNT: SIGNIFICANT CHANGE UP
SPECIMEN SOURCE: SIGNIFICANT CHANGE UP

## 2023-09-26 ENCOUNTER — APPOINTMENT (OUTPATIENT)
Dept: VASCULAR SURGERY | Facility: CLINIC | Age: 63
End: 2023-09-26
Payer: MEDICAID

## 2023-09-26 VITALS
HEART RATE: 78 BPM | OXYGEN SATURATION: 98 % | SYSTOLIC BLOOD PRESSURE: 166 MMHG | TEMPERATURE: 98.3 F | BODY MASS INDEX: 36.38 KG/M2 | RESPIRATION RATE: 16 BRPM | HEIGHT: 65.5 IN | WEIGHT: 221 LBS | DIASTOLIC BLOOD PRESSURE: 84 MMHG

## 2023-09-26 PROCEDURE — 99215 OFFICE O/P EST HI 40 MIN: CPT

## 2023-09-26 PROCEDURE — 93923 UPR/LXTR ART STDY 3+ LVLS: CPT

## 2023-09-28 ENCOUNTER — INPATIENT (INPATIENT)
Facility: HOSPITAL | Age: 63
LOS: 6 days | Discharge: HOME | DRG: 271 | End: 2023-10-05
Attending: SURGERY | Admitting: SURGERY
Payer: MEDICAID

## 2023-09-28 VITALS — HEIGHT: 66 IN | WEIGHT: 221.56 LBS

## 2023-09-28 DIAGNOSIS — M79.604 PAIN IN RIGHT LEG: ICD-10-CM

## 2023-09-28 DIAGNOSIS — Z95.828 PRESENCE OF OTHER VASCULAR IMPLANTS AND GRAFTS: Chronic | ICD-10-CM

## 2023-09-28 DIAGNOSIS — Z96.641 PRESENCE OF RIGHT ARTIFICIAL HIP JOINT: Chronic | ICD-10-CM

## 2023-09-28 LAB
ALBUMIN SERPL ELPH-MCNC: 4.3 G/DL — SIGNIFICANT CHANGE UP (ref 3.3–5.2)
ALP SERPL-CCNC: 110 U/L — SIGNIFICANT CHANGE UP (ref 40–120)
ALT FLD-CCNC: 15 U/L — SIGNIFICANT CHANGE UP
ANION GAP SERPL CALC-SCNC: 13 MMOL/L — SIGNIFICANT CHANGE UP (ref 5–17)
APTT BLD: 30.4 SEC — SIGNIFICANT CHANGE UP (ref 24.5–35.6)
AST SERPL-CCNC: 22 U/L — SIGNIFICANT CHANGE UP
BASOPHILS # BLD AUTO: 0.05 K/UL — SIGNIFICANT CHANGE UP (ref 0–0.2)
BASOPHILS NFR BLD AUTO: 0.6 % — SIGNIFICANT CHANGE UP (ref 0–2)
BILIRUB SERPL-MCNC: 0.3 MG/DL — LOW (ref 0.4–2)
BLD GP AB SCN SERPL QL: SIGNIFICANT CHANGE UP
BUN SERPL-MCNC: 17 MG/DL — SIGNIFICANT CHANGE UP (ref 8–20)
CALCIUM SERPL-MCNC: 9.3 MG/DL — SIGNIFICANT CHANGE UP (ref 8.4–10.5)
CHLORIDE SERPL-SCNC: 103 MMOL/L — SIGNIFICANT CHANGE UP (ref 96–108)
CO2 SERPL-SCNC: 23 MMOL/L — SIGNIFICANT CHANGE UP (ref 22–29)
CREAT SERPL-MCNC: 0.7 MG/DL — SIGNIFICANT CHANGE UP (ref 0.5–1.3)
EGFR: 97 ML/MIN/1.73M2 — SIGNIFICANT CHANGE UP
EOSINOPHIL # BLD AUTO: 0.36 K/UL — SIGNIFICANT CHANGE UP (ref 0–0.5)
EOSINOPHIL NFR BLD AUTO: 4.7 % — SIGNIFICANT CHANGE UP (ref 0–6)
GLUCOSE SERPL-MCNC: 122 MG/DL — HIGH (ref 70–99)
HCT VFR BLD CALC: 41.3 % — SIGNIFICANT CHANGE UP (ref 34.5–45)
HGB BLD-MCNC: 14.2 G/DL — SIGNIFICANT CHANGE UP (ref 11.5–15.5)
IMM GRANULOCYTES NFR BLD AUTO: 0.4 % — SIGNIFICANT CHANGE UP (ref 0–0.9)
INR BLD: 0.9 RATIO — SIGNIFICANT CHANGE UP (ref 0.85–1.18)
LYMPHOCYTES # BLD AUTO: 2.34 K/UL — SIGNIFICANT CHANGE UP (ref 1–3.3)
LYMPHOCYTES # BLD AUTO: 30.2 % — SIGNIFICANT CHANGE UP (ref 13–44)
MCHC RBC-ENTMCNC: 30.1 PG — SIGNIFICANT CHANGE UP (ref 27–34)
MCHC RBC-ENTMCNC: 34.4 GM/DL — SIGNIFICANT CHANGE UP (ref 32–36)
MCV RBC AUTO: 87.5 FL — SIGNIFICANT CHANGE UP (ref 80–100)
MONOCYTES # BLD AUTO: 0.61 K/UL — SIGNIFICANT CHANGE UP (ref 0–0.9)
MONOCYTES NFR BLD AUTO: 7.9 % — SIGNIFICANT CHANGE UP (ref 2–14)
NEUTROPHILS # BLD AUTO: 4.35 K/UL — SIGNIFICANT CHANGE UP (ref 1.8–7.4)
NEUTROPHILS NFR BLD AUTO: 56.2 % — SIGNIFICANT CHANGE UP (ref 43–77)
PLATELET # BLD AUTO: 192 K/UL — SIGNIFICANT CHANGE UP (ref 150–400)
POTASSIUM SERPL-MCNC: 4.4 MMOL/L — SIGNIFICANT CHANGE UP (ref 3.5–5.3)
POTASSIUM SERPL-SCNC: 4.4 MMOL/L — SIGNIFICANT CHANGE UP (ref 3.5–5.3)
PROT SERPL-MCNC: 6.9 G/DL — SIGNIFICANT CHANGE UP (ref 6.6–8.7)
PROTHROM AB SERPL-ACNC: 10 SEC — SIGNIFICANT CHANGE UP (ref 9.5–13)
RBC # BLD: 4.72 M/UL — SIGNIFICANT CHANGE UP (ref 3.8–5.2)
RBC # FLD: 13.1 % — SIGNIFICANT CHANGE UP (ref 10.3–14.5)
SODIUM SERPL-SCNC: 139 MMOL/L — SIGNIFICANT CHANGE UP (ref 135–145)
WBC # BLD: 7.74 K/UL — SIGNIFICANT CHANGE UP (ref 3.8–10.5)
WBC # FLD AUTO: 7.74 K/UL — SIGNIFICANT CHANGE UP (ref 3.8–10.5)

## 2023-09-28 PROCEDURE — 93010 ELECTROCARDIOGRAM REPORT: CPT

## 2023-09-28 PROCEDURE — 93971 EXTREMITY STUDY: CPT | Mod: 26,RT

## 2023-09-28 PROCEDURE — 99285 EMERGENCY DEPT VISIT HI MDM: CPT

## 2023-09-28 PROCEDURE — 93923 UPR/LXTR ART STDY 3+ LVLS: CPT | Mod: 26

## 2023-09-28 PROCEDURE — 93306 TTE W/DOPPLER COMPLETE: CPT | Mod: 26

## 2023-09-28 PROCEDURE — 93926 LOWER EXTREMITY STUDY: CPT | Mod: 26,RT

## 2023-09-28 PROCEDURE — 75635 CT ANGIO ABDOMINAL ARTERIES: CPT | Mod: 26

## 2023-09-28 PROCEDURE — 99223 1ST HOSP IP/OBS HIGH 75: CPT

## 2023-09-28 RX ORDER — SENNA PLUS 8.6 MG/1
2 TABLET ORAL AT BEDTIME
Refills: 0 | Status: DISCONTINUED | OUTPATIENT
Start: 2023-09-28 | End: 2023-10-02

## 2023-09-28 RX ORDER — ATORVASTATIN CALCIUM 80 MG/1
20 TABLET, FILM COATED ORAL AT BEDTIME
Refills: 0 | Status: DISCONTINUED | OUTPATIENT
Start: 2023-09-28 | End: 2023-10-02

## 2023-09-28 RX ORDER — COLLAGENASE CLOSTRIDIUM HIST. 250 UNIT/G
1 OINTMENT (GRAM) TOPICAL DAILY
Refills: 0 | Status: DISCONTINUED | OUTPATIENT
Start: 2023-09-28 | End: 2023-10-02

## 2023-09-28 RX ORDER — OXYCODONE HYDROCHLORIDE 5 MG/1
10 TABLET ORAL EVERY 4 HOURS
Refills: 0 | Status: DISCONTINUED | OUTPATIENT
Start: 2023-09-28 | End: 2023-10-02

## 2023-09-28 RX ORDER — NICOTINE POLACRILEX 2 MG
1 GUM BUCCAL DAILY
Refills: 0 | Status: DISCONTINUED | OUTPATIENT
Start: 2023-09-28 | End: 2023-10-02

## 2023-09-28 RX ORDER — HYDROMORPHONE HYDROCHLORIDE 2 MG/ML
0.5 INJECTION INTRAMUSCULAR; INTRAVENOUS; SUBCUTANEOUS EVERY 4 HOURS
Refills: 0 | Status: DISCONTINUED | OUTPATIENT
Start: 2023-09-28 | End: 2023-09-30

## 2023-09-28 RX ORDER — HEPARIN SODIUM 5000 [USP'U]/ML
5000 INJECTION INTRAVENOUS; SUBCUTANEOUS EVERY 8 HOURS
Refills: 0 | Status: DISCONTINUED | OUTPATIENT
Start: 2023-09-28 | End: 2023-10-02

## 2023-09-28 RX ORDER — ASPIRIN/CALCIUM CARB/MAGNESIUM 324 MG
81 TABLET ORAL DAILY
Refills: 0 | Status: DISCONTINUED | OUTPATIENT
Start: 2023-09-28 | End: 2023-10-02

## 2023-09-28 RX ORDER — SODIUM CHLORIDE 9 MG/ML
1000 INJECTION, SOLUTION INTRAVENOUS
Refills: 0 | Status: DISCONTINUED | OUTPATIENT
Start: 2023-09-28 | End: 2023-09-29

## 2023-09-28 RX ORDER — ACETAMINOPHEN 500 MG
650 TABLET ORAL EVERY 6 HOURS
Refills: 0 | Status: DISCONTINUED | OUTPATIENT
Start: 2023-09-28 | End: 2023-10-02

## 2023-09-28 RX ORDER — LISINOPRIL 2.5 MG/1
2.5 TABLET ORAL DAILY
Refills: 0 | Status: DISCONTINUED | OUTPATIENT
Start: 2023-09-28 | End: 2023-10-02

## 2023-09-28 RX ORDER — OXYCODONE HYDROCHLORIDE 5 MG/1
5 TABLET ORAL EVERY 4 HOURS
Refills: 0 | Status: DISCONTINUED | OUTPATIENT
Start: 2023-09-28 | End: 2023-10-02

## 2023-09-28 RX ORDER — L.ACIDOPH/B.ANIMALIS/B.LONGUM 15B CELL
1 CAPSULE ORAL
Qty: 0 | Refills: 0 | DISCHARGE

## 2023-09-28 RX ORDER — ONDANSETRON 8 MG/1
4 TABLET, FILM COATED ORAL EVERY 6 HOURS
Refills: 0 | Status: DISCONTINUED | OUTPATIENT
Start: 2023-09-28 | End: 2023-10-02

## 2023-09-28 RX ADMIN — SENNA PLUS 2 TABLET(S): 8.6 TABLET ORAL at 22:27

## 2023-09-28 RX ADMIN — Medication 1 PATCH: at 12:41

## 2023-09-28 RX ADMIN — HEPARIN SODIUM 5000 UNIT(S): 5000 INJECTION INTRAVENOUS; SUBCUTANEOUS at 22:27

## 2023-09-28 RX ADMIN — SODIUM CHLORIDE 125 MILLILITER(S): 9 INJECTION, SOLUTION INTRAVENOUS at 22:27

## 2023-09-28 RX ADMIN — OXYCODONE HYDROCHLORIDE 5 MILLIGRAM(S): 5 TABLET ORAL at 16:46

## 2023-09-28 RX ADMIN — HEPARIN SODIUM 5000 UNIT(S): 5000 INJECTION INTRAVENOUS; SUBCUTANEOUS at 17:02

## 2023-09-28 RX ADMIN — Medication 81 MILLIGRAM(S): at 12:39

## 2023-09-28 RX ADMIN — ATORVASTATIN CALCIUM 20 MILLIGRAM(S): 80 TABLET, FILM COATED ORAL at 22:27

## 2023-09-28 NOTE — ED ADULT NURSE NOTE - TEMPLATE LIST FOR HEAD TO TOE ASSESSMENT
Spoke to patient.  She is on phone with her sister and would like a call back on Monday.      Cardiac

## 2023-09-28 NOTE — ED PROVIDER NOTE - ATTENDING APP SHARED VISIT CONTRIBUTION OF CARE
62 yo female with pmhx of HLD, HTN, rt hip replacement 10 yrs ago, hx of stent in the left leg for occlusion presents with RLE pain x3-4 wks. Pt reports that for the last 3-4 wks has been having pain in rt calf. States she saw a vascular surgeon Dr. Blair and had an US performed last Wednesday. She reports she was told she had a "blockage" in the rt leg but does not know any further information. now notes worsening pain.  Also endorsing coolness to the rt toes x3-4 wks, denies worsening of the symptoms. Denies f/c/n/v/cp/sob/palpitations/ cough/rash/headache/dizziness/abd.pain/d/c/dysuria/hematuria    Head: atraumatic, normacephalic  Face: atraumatic, no crepitus no orbiral/maxillary/mandibular ttp  throat: uvula midline no exudates  eyes: perrla eomi  heart: rrr s1s2  lungs: ctab  abd: soft, nt nd +bs no rebound/guarding no cva ttp  skin: warm  LE: right calf ttp coldness to bl toes palpable dorsalis pedis pulses dopplerable posterior tibial  back: no midline cervical/thoracic/lumbar ttp    --labs teresa sono vascular consult admit

## 2023-09-28 NOTE — CONSULT NOTE ADULT - SUBJECTIVE AND OBJECTIVE BOX
CHIEF COMPLAINT:    HPI: 63 F smoker, with hyperlipidemia s/p left sided CFA to [place] pop bypass 7/2019 with Dr. Burton. Presenting with persistent rest pain. Pt states that she has had this pain for several weeks and it is worse in the right leg. She has a hx of revascularization in the L leg. She is known to Dr. Blair and recently presented to his office for this pain and was advised to go to the ED If the pain did not resolve. She reports that she is unable to sleep at night and it is affecting her work and her ability to do her job.  Cardiology consultation requested for possible bypass of lower ext.     PAST MEDICAL & SURGICAL HISTORY:  HLD (hyperlipidemia)      Wound  non healing wound  left outer ankle, dr garrett  wd care md  as of 6-24-19  is using po augmentin  , wd treatment  wash with soap and water, place marissa  patach moistened, cover with dsd   daily      Cellulitis  LLE      PAD (peripheral artery disease)  [place] 0.54 LLE 2019      Smoker  active      Osteomyelitis  LLE leg      Pseudomonas infection  LLE      Allergic rhinitis, seasonal      PVD (peripheral vascular disease)      History of hip replacement, total, right      S/P femoral-femoral bypass surgery  with stents      Status post PICC central line placement  RUE now removed Used for antibiotics IV          Allergies    vancomycin (Rash; Flushing; Hives)    Intolerances        MEDICATIONS  (STANDING):  aspirin  chewable 81 milliGRAM(s) Oral daily  atorvastatin 20 milliGRAM(s) Oral at bedtime  lactated ringers. 1000 milliLiter(s) (125 mL/Hr) IV Continuous <Continuous>  lisinopril 2.5 milliGRAM(s) Oral daily  nicotine -  14 mG/24Hr(s) Patch 1 Patch Transdermal daily  senna 2 Tablet(s) Oral at bedtime    MEDICATIONS  (PRN):  acetaminophen     Tablet .. 650 milliGRAM(s) Oral every 6 hours PRN Temp greater or equal to 38C (100.4F), Mild Pain (1 - 3)  aluminum hydroxide/magnesium hydroxide/simethicone Suspension 30 milliLiter(s) Oral every 4 hours PRN Dyspepsia  HYDROmorphone  Injectable 0.5 milliGRAM(s) IV Push every 4 hours PRN breakthru  ondansetron Injectable 4 milliGRAM(s) IV Push every 6 hours PRN Nausea  oxyCODONE    IR 10 milliGRAM(s) Oral every 4 hours PRN Severe Pain (7 - 10)  oxyCODONE    IR 5 milliGRAM(s) Oral every 4 hours PRN Moderate Pain (4 - 6)      FAMILY HISTORY:  FH: diabetes mellitus (Father)    FH: CVA (cerebrovascular accident) (Mother)        ***No family history of premature coronary artery disease or sudden cardiac death    SOCIAL HISTORY:  Smoking-  Alcohol-  Illicit Drug use-    REVIEW OF SYSTEMS:  Constitutional: [ ] fever, [ ]weight loss,  [ ]fatigue  Eyes: [ ] visual changes  Respiratory: [ ]shortness of breath;  [ ] cough, [ ]wheezing, [ ]chills, [ ]hemoptysis  Cardiovascular: [ ] chest pain, [ ]palpitations, [ ]dizziness,  [ ]leg swelling [ ]syncope  Gastrointestinal: [ ] abdominal pain, [ ]nausea, [ ]vomiting,  [ ]diarrhea   Genitourinary: [ ] dysuria, [ ] hematuria  Neurologic: [ ] headaches [ ] tremors  [ ] weakness [ ] lightheadedness  Skin: [ ] itching, [ ]burning, [ ] rashes  Endocrine: [ ] heat or cold intolerance  Musculoskeletal: [ ] joint pain or swelling; [ ] muscle, back, or extremity pain  Psychiatric: [ ] depression, [ ]anxiety, [ ]mood swings, or [ ]difficulty sleeping  Hematologic: [ ] easy bruising, [ ] bleeding gums     [ x] All others negative	  [ ] Unable to obtain    Vital Signs Last 24 Hrs  T(C): 36.6 (28 Sep 2023 06:38), Max: 36.8 (28 Sep 2023 03:45)  T(F): 97.8 (28 Sep 2023 06:38), Max: 98.3 (28 Sep 2023 03:45)  HR: 80 (28 Sep 2023 06:38) (76 - 80)  BP: 152/85 (28 Sep 2023 06:38) (129/82 - 152/85)  BP(mean): --  RR: 20 (28 Sep 2023 06:38) (20 - 20)  SpO2: 99% (28 Sep 2023 06:38) (96% - 99%)    Parameters below as of 28 Sep 2023 06:38  Patient On (Oxygen Delivery Method): room air      I&O's Summary      PHYSICAL EXAM:  General: No acute distress  HEENT: EOMI  Neck:  No JVD  Lungs: Clear to auscultation bilaterally; No rales or wheezing  Heart: Regular rate and rhythm; No murmurs, rubs, or gallops  Abdomen: soft, non tender, non distended   Extremities: warm, no edema   Nervous system:  Alert & Oriented X3  Psychiatric: Normal affect  Skin: No rashes or lesions    LABS:  09-28    139  |  103  |  17.0  ----------------------------<  122<H>  4.4   |  23.0  |  0.70    Ca    9.3      28 Sep 2023 04:00    TPro  6.9  /  Alb  4.3  /  TBili  0.3<L>  /  DBili  x   /  AST  22  /  ALT  15  /  AlkPhos  110  09-28    Creatinine Trend: 0.70<--                        14.2   7.74  )-----------( 192      ( 28 Sep 2023 04:00 )             41.3     PT/INR - ( 28 Sep 2023 04:00 )   PT: 10.0 sec;   INR: 0.90 ratio         PTT - ( 28 Sep 2023 04:00 )  PTT:30.4 sec    Lipid Panel:   Cardiac Enzymes:           RADIOLOGY:    ECG [my interpretation]:    TELEMETRY:    ECHO:    STRESS TEST:    CATHETERIZATION: CHIEF COMPLAINT:    HPI: 63 F smoker, with hyperlipidemia s/p left sided CFA to [place] pop bypass 7/2019 with Dr. Burton. Presenting with persistent rest pain. Pt states that she has had this pain for several weeks and it is worse in the right leg. She has a hx of revascularization in the L leg. She is known to Dr. Blair and recently presented to his office for this pain and was advised to go to the ED If the pain did not resolve. She reports that she is unable to sleep at night and it is affecting her work and her ability to do her job.  Cardiology consultation requested for possible bypass of lower ext. Patient denies CV symptoms at this time.     PAST MEDICAL & SURGICAL HISTORY:  HLD (hyperlipidemia)      Wound  non healing wound  left outer ankle, dr garrett  wd care md  as of 6-24-19  is using po augmentin  , wd treatment  wash with soap and water, place marissa  patach moistened, cover with dsd   daily      Cellulitis  LLE      PAD (peripheral artery disease)  [place] 0.54 LLE 2019      Smoker  active      Osteomyelitis  LLE leg      Pseudomonas infection  LLE      Allergic rhinitis, seasonal      PVD (peripheral vascular disease)      History of hip replacement, total, right      S/P femoral-femoral bypass surgery  with stents      Status post PICC central line placement  RUE now removed Used for antibiotics IV          Allergies    vancomycin (Rash; Flushing; Hives)    Intolerances        MEDICATIONS  (STANDING):  aspirin  chewable 81 milliGRAM(s) Oral daily  atorvastatin 20 milliGRAM(s) Oral at bedtime  lactated ringers. 1000 milliLiter(s) (125 mL/Hr) IV Continuous <Continuous>  lisinopril 2.5 milliGRAM(s) Oral daily  nicotine -  14 mG/24Hr(s) Patch 1 Patch Transdermal daily  senna 2 Tablet(s) Oral at bedtime    MEDICATIONS  (PRN):  acetaminophen     Tablet .. 650 milliGRAM(s) Oral every 6 hours PRN Temp greater or equal to 38C (100.4F), Mild Pain (1 - 3)  aluminum hydroxide/magnesium hydroxide/simethicone Suspension 30 milliLiter(s) Oral every 4 hours PRN Dyspepsia  HYDROmorphone  Injectable 0.5 milliGRAM(s) IV Push every 4 hours PRN breakthru  ondansetron Injectable 4 milliGRAM(s) IV Push every 6 hours PRN Nausea  oxyCODONE    IR 10 milliGRAM(s) Oral every 4 hours PRN Severe Pain (7 - 10)  oxyCODONE    IR 5 milliGRAM(s) Oral every 4 hours PRN Moderate Pain (4 - 6)      FAMILY HISTORY:  FH: diabetes mellitus (Father)    FH: CVA (cerebrovascular accident) (Mother)        ***No family history of premature coronary artery disease or sudden cardiac death    SOCIAL HISTORY:  Smoking-  Alcohol-  Illicit Drug use-    REVIEW OF SYSTEMS:  Constitutional: [ ] fever, [ ]weight loss,  [ ]fatigue  Eyes: [ ] visual changes  Respiratory: [ ]shortness of breath;  [ ] cough, [ ]wheezing, [ ]chills, [ ]hemoptysis  Cardiovascular: [ ] chest pain, [ ]palpitations, [ ]dizziness,  [ ]leg swelling [ ]syncope  Gastrointestinal: [ ] abdominal pain, [ ]nausea, [ ]vomiting,  [ ]diarrhea   Genitourinary: [ ] dysuria, [ ] hematuria  Neurologic: [ ] headaches [ ] tremors  [ ] weakness [ ] lightheadedness  Skin: [ ] itching, [ ]burning, [ ] rashes  Endocrine: [ ] heat or cold intolerance  Musculoskeletal: [ ] joint pain or swelling; [x ] muscle, back, or extremity pain  Psychiatric: [ ] depression, [ ]anxiety, [ ]mood swings, or [ ]difficulty sleeping  Hematologic: [ ] easy bruising, [ ] bleeding gums     [ x] All others negative	  [ ] Unable to obtain    Vital Signs Last 24 Hrs  T(C): 36.6 (28 Sep 2023 06:38), Max: 36.8 (28 Sep 2023 03:45)  T(F): 97.8 (28 Sep 2023 06:38), Max: 98.3 (28 Sep 2023 03:45)  HR: 80 (28 Sep 2023 06:38) (76 - 80)  BP: 152/85 (28 Sep 2023 06:38) (129/82 - 152/85)  BP(mean): --  RR: 20 (28 Sep 2023 06:38) (20 - 20)  SpO2: 99% (28 Sep 2023 06:38) (96% - 99%)    Parameters below as of 28 Sep 2023 06:38  Patient On (Oxygen Delivery Method): room air      I&O's Summary      PHYSICAL EXAM:  General: No acute distress  HEENT: EOMI  Neck:  No JVD  Lungs: Clear to auscultation bilaterally; No rales or wheezing  Heart: Regular rate and rhythm; No murmurs, rubs, or gallops  Abdomen: soft, non tender, non distended   Extremities: warm, no edema   Nervous system:  Alert & Oriented X3  Psychiatric: Normal affect  Skin: No rashes or lesions    LABS:  09-28    139  |  103  |  17.0  ----------------------------<  122<H>  4.4   |  23.0  |  0.70    Ca    9.3      28 Sep 2023 04:00    TPro  6.9  /  Alb  4.3  /  TBili  0.3<L>  /  DBili  x   /  AST  22  /  ALT  15  /  AlkPhos  110  09-28    Creatinine Trend: 0.70<--                        14.2   7.74  )-----------( 192      ( 28 Sep 2023 04:00 )             41.3     PT/INR - ( 28 Sep 2023 04:00 )   PT: 10.0 sec;   INR: 0.90 ratio         PTT - ( 28 Sep 2023 04:00 )  PTT:30.4 sec    Lipid Panel:   Cardiac Enzymes:           RADIOLOGY:    ECG: N/A    TELEMETRY: SR    ECHO:    STRESS TEST:    CATHETERIZATION:

## 2023-09-28 NOTE — CONSULT NOTE ADULT - ASSESSMENT
63 F smoker, with hyperlipidemia s/p left sided CFA to [place] pop bypass 7/2019 with Dr. Burton. Presenting with persistent rest pain. Pt states that she has had this pain for several weeks and it is worse in the right leg. She has a hx of revascularization in the L leg. She is known to Dr. Blair and recently presented to his office for this pain and was advised to go to the ED If the pain did not resolve. She reports that she is unable to sleep at night and it is affecting her work and her ability to do her job.  Cardiology consultation requested for possible bypass of lower ext.  63 F smoker, with hyperlipidemia s/p left sided CFA to [place] pop bypass 7/2019 with Dr. Burton. Presenting with persistent rest pain. Pt states that she has had this pain for several weeks and it is worse in the right leg. She has a hx of revascularization in the L leg. She is known to Dr. Blair and recently presented to his office for this pain and was advised to go to the ED If the pain did not resolve. She reports that she is unable to sleep at night and it is affecting her work and her ability to do her job.  Cardiology consultation requested for possible bypass of lower ext. Patient denies CV symptoms at this time.       Patient is planned (Possibly) for an intermediate to high risk surgical intervention (vascular bypass)  Patient will need to complete nuclear stress test and echocardiogram to complete risk stratification   please obtain EKG  Final risk stratification post testing.

## 2023-09-28 NOTE — H&P ADULT - ASSESSMENT
ASSESSMENT: 64yo F w/ known hx of PAD presenting with severe RLE rest pain.     PLAN:   - admit to vascular surgery, Dr. Bishop  - f/u CTA, lower extremity duplex  - f/u labs  - further eval/intervention pending    Pt seen and plan discussed with attending, Dr. Bishop

## 2023-09-28 NOTE — ED PROVIDER NOTE - OBJECTIVE STATEMENT
64 yo female with pmhx of HLD, HTN, rt hip replacement 10 yrs ago, hx of stent in the left leg for occlusion presents with RLE pain x3-4 wks. Pt reports that for the last 3-4 wks has been having pain in rt calf. States she saw a vascular surgeon Dr. Blair and had an US performed last Wednesday. She reports she was told she had a "blockage" in the rt leg but does not know any further information. Denies being told she had a blood and denies being started on any blood thinners at that time. States she was told if she has increased pain in the leg to come to the ER. States she nl ambulates without assistance however has been using a cane over the last few days due to the pain. Also endorsing coolness to the rt toes x3-4 wks, denies worsening of the symptoms. Denies fever, chills, body aches, dizziness, LOC, vision changes, cp, palpitations, sob, abd pain, n/v/c/d, dysuria, hematuria, paresthesias in the extremities, rashes. Reports she currently smokes 1 ppd.

## 2023-09-28 NOTE — ED PROVIDER NOTE - CLINICAL SUMMARY MEDICAL DECISION MAKING FREE TEXT BOX
64 yo female with pmhx of HLD, HTN, rt hip replacement 10 yrs ago, hx of stent in the left leg for occlusion presents with RLE pain x3-4 wks. Vascular was consulted and will be admitted to their services for further workup and management.

## 2023-09-28 NOTE — ED PROVIDER NOTE - PHYSICAL EXAMINATION
Gen: No acute distress, non toxic  HEENT: Mucous membranes moist, pink conjunctivae, EOMI  CV: RRR, nl s1/s2.  Resp: CTAB, normal rate and effort  GI: Abdomen soft, NT, ND. No rebound, no guarding  Neuro: A&O x 3, moving all 4 extremities  MSK: No spine or joint tenderness to palpation  Skin: No rashes. intact and perfused. cap refill <2sec  Vascular: +rt calf swelling and ttp. dorsalis pedal pulses present b/l however rt more faint than left. Gen: No acute distress, non toxic  HEENT: Mucous membranes moist, pink conjunctivae, EOMI  CV: RRR, nl s1/s2.  Resp: CTAB, normal rate and effort  GI: Abdomen soft, NT, ND. No rebound, no guarding  Neuro: A&O x 3, moving all 4 extremities  MSK: No spine or joint tenderness to palpation  Skin: No rashes. intact and perfused. cap refill <2sec  Vascular: +rt calf swelling and ttp. palpable dorsalis pedal pulses b/l however rt more faint than left.

## 2023-09-28 NOTE — ED PROVIDER NOTE - NS ED ATTENDING STATEMENT MOD
This was a shared visit with the SUNDAY. I reviewed and verified the documentation and independently performed the documented:

## 2023-09-28 NOTE — ED ADULT NURSE REASSESSMENT NOTE - NS ED NURSE REASSESS COMMENT FT1
assumed care of pt from previous RN C.  pt remains resting in stretcher. Pt transported to CT at this time, aware of POC. pt admitted to surg r/t R leg pain. denies CP, SOB, abd pain, fever chills. RR wnl, VSS as charted. NPO order remains in place. safety maintained.
pt requesting to speak with MD in regards to POC. message to MD Blair , pt will not take take heparin dose unless spoken with by team.
pt taken to US via transport. Pt obtained bed on 3 tower, report given to CHANDAN renee. US called that pt has clean bed and pt can be taken to 3 tower.
pt stable pending ct and bed assignment at this time, pt deneis chest pain sob RR even un labored no acute issues noted, fall and safety precautions in place

## 2023-09-28 NOTE — ED ADULT TRIAGE NOTE - CHIEF COMPLAINT QUOTE
Pt c/o right leg pain 9/10, describes as shooting pain, x4 days, seen by PCP, told to come to ED if pain persists, concerned for DVT.  Denies injury, redness.  Hx. of right hip replacement 8 yrs ago.

## 2023-09-28 NOTE — H&P ADULT - HISTORY OF PRESENT ILLNESS
62yo F w/ hx of HTN, HL, PAD presenting with persistent rest pain. Pt states that she has had this pain for several weeks and it is worse in the right leg. She has a hx of revascularization in the L leg. She is known to Dr. Blair and recently presented to his office for this pain and was advised to go to the ED If the pain did not resolve. She reports that she is unable to sleep at night and it is affecting her work and her ability to do her job. Hemodynamically stable. CTA and duplex u/s pending.

## 2023-09-28 NOTE — ED ADULT NURSE NOTE - OBJECTIVE STATEMENT
assumed pt care pt sent by md for LLE pain at rest keeping her up at night, PCP concerned for poss dvt. Pt denies chest pain sob or other complaints. NSR on cardiac monitor VSS on RA pt NAD RR even unlabored s1s2 belly soft nontender. fall and safety precautions in place

## 2023-09-28 NOTE — H&P ADULT - NSHPLABSRESULTS_GEN_ALL_CORE
Vitals:  T(C): 36.8 (09-28-23 @ 03:45), Max: 36.8 (09-28-23 @ 03:45)  T(F): 98.3 (09-28-23 @ 03:45), Max: 98.3 (09-28-23 @ 03:45)  HR: 76 (09-28-23 @ 03:45) (76 - 76)  BP: 129/82 (09-28-23 @ 03:45) (129/82 - 129/82)  ABP: --  ABP(mean): --  RR: 20 (09-28-23 @ 03:45) (20 - 20)  SpO2: 96% (09-28-23 @ 03:45) (96% - 96%)    LABS:  cret                        14.2   7.74  )-----------( 192      ( 28 Sep 2023 04:00 )             41.3     09-28    139  |  103  |  17.0  ----------------------------<  122<H>  4.4   |  23.0  |  0.70    Ca    9.3      28 Sep 2023 04:00    TPro  6.9  /  Alb  4.3  /  TBili  0.3<L>  /  DBili  x   /  AST  22  /  ALT  15  /  AlkPhos  110  09-28    PT/INR - ( 28 Sep 2023 04:00 )   PT: 10.0 sec;   INR: 0.90 ratio         PTT - ( 28 Sep 2023 04:00 )  PTT:30.4 sec

## 2023-09-28 NOTE — H&P ADULT - NSHPPHYSICALEXAM_GEN_ALL_CORE
GENERAL: NAD, lying in bed comfortably  HEAD:  Atraumatic, normocephalic  EYES: EOMI, PERRLA, conjunctiva and sclera clear  ENT: Moist mucous membranes  NECK: Supple, no JVD  HEART: RRR  LUNGS: Unlabored respirations.    ABDOMEN: Soft, nontender, nondistended  EXTREMITIES: 1+ palpable pulse in R DP, 2+ palpable pulse in L DP  NERVOUS SYSTEM:  A&Ox3, no focal deficits   SKIN: No rashes or lesions

## 2023-09-28 NOTE — ED ADULT NURSE NOTE - NSSEPSISSUSPECTED_ED_A_ED
No conducted a detailed discussion... I had a detailed discussion with the patient and/or guardian regarding the historical points, exam findings, and any diagnostic results supporting the discharge/admit diagnosis.

## 2023-09-28 NOTE — PROGRESS NOTE ADULT - SUBJECTIVE AND OBJECTIVE BOX
SUBJECTIVE: 64yo F w/ hx of PAD presenting with persistent rest pain. Pt states that she has had this pain for several weeks and it is worse in the right leg. She has a hx of revascularization in the L leg. She is known to Dr. Blair and recently presented to his office for this pain and was advised to go to the ED If the pain did not resolve. She reports that she is unable to sleep at night and it is affecting her work and her ability to do her job. Pulses are palpable bilaterally. Hemodynamically stable.     Vitals:  T(C): 36.8 (09-28-23 @ 03:45), Max: 36.8 (09-28-23 @ 03:45)  T(F): 98.3 (09-28-23 @ 03:45), Max: 98.3 (09-28-23 @ 03:45)  HR: 76 (09-28-23 @ 03:45) (76 - 76)  BP: 129/82 (09-28-23 @ 03:45) (129/82 - 129/82)  ABP: --  ABP(mean): --  RR: 20 (09-28-23 @ 03:45) (20 - 20)  SpO2: 96% (09-28-23 @ 03:45) (96% - 96%)    LABS:  cret                        14.2   7.74  )-----------( 192      ( 28 Sep 2023 04:00 )             41.3     09-28    139  |  103  |  17.0  ----------------------------<  122<H>  4.4   |  23.0  |  0.70    Ca    9.3      28 Sep 2023 04:00    TPro  6.9  /  Alb  4.3  /  TBili  0.3<L>  /  DBili  x   /  AST  22  /  ALT  15  /  AlkPhos  110  09-28    PT/INR - ( 28 Sep 2023 04:00 )   PT: 10.0 sec;   INR: 0.90 ratio         PTT - ( 28 Sep 2023 04:00 )  PTT:30.4 sec    GENERAL: NAD, lying in bed comfortably  HEAD:  Atraumatic, normocephalic  EYES: EOMI, PERRLA, conjunctiva and sclera clear  ENT: Moist mucous membranes  NECK: Supple, no JVD  HEART: RRR  LUNGS: Unlabored respirations.    ABDOMEN: Soft, nontender, nondistended  EXTREMITIES: 1+ palpable pulse in R DP, 2+ palpable pulse in L DP  NERVOUS SYSTEM:  A&Ox3, no focal deficits   SKIN: No rashes or lesions

## 2023-09-29 LAB
ALBUMIN SERPL ELPH-MCNC: 3.2 G/DL — LOW (ref 3.3–5.2)
ALP SERPL-CCNC: 85 U/L — SIGNIFICANT CHANGE UP (ref 40–120)
ALT FLD-CCNC: 11 U/L — SIGNIFICANT CHANGE UP
ANION GAP SERPL CALC-SCNC: 11 MMOL/L — SIGNIFICANT CHANGE UP (ref 5–17)
APTT BLD: 31.1 SEC — SIGNIFICANT CHANGE UP (ref 24.5–35.6)
AST SERPL-CCNC: 13 U/L — SIGNIFICANT CHANGE UP
BASOPHILS # BLD AUTO: 0.04 K/UL — SIGNIFICANT CHANGE UP (ref 0–0.2)
BASOPHILS NFR BLD AUTO: 0.6 % — SIGNIFICANT CHANGE UP (ref 0–2)
BILIRUB SERPL-MCNC: 0.4 MG/DL — SIGNIFICANT CHANGE UP (ref 0.4–2)
BUN SERPL-MCNC: 12.5 MG/DL — SIGNIFICANT CHANGE UP (ref 8–20)
CALCIUM SERPL-MCNC: 8.7 MG/DL — SIGNIFICANT CHANGE UP (ref 8.4–10.5)
CHLORIDE SERPL-SCNC: 106 MMOL/L — SIGNIFICANT CHANGE UP (ref 96–108)
CO2 SERPL-SCNC: 23 MMOL/L — SIGNIFICANT CHANGE UP (ref 22–29)
CREAT SERPL-MCNC: 0.61 MG/DL — SIGNIFICANT CHANGE UP (ref 0.5–1.3)
EGFR: 100 ML/MIN/1.73M2 — SIGNIFICANT CHANGE UP
EOSINOPHIL # BLD AUTO: 0.31 K/UL — SIGNIFICANT CHANGE UP (ref 0–0.5)
EOSINOPHIL NFR BLD AUTO: 4.6 % — SIGNIFICANT CHANGE UP (ref 0–6)
GLUCOSE SERPL-MCNC: 106 MG/DL — HIGH (ref 70–99)
HCT VFR BLD CALC: 36.8 % — SIGNIFICANT CHANGE UP (ref 34.5–45)
HGB BLD-MCNC: 12.4 G/DL — SIGNIFICANT CHANGE UP (ref 11.5–15.5)
IMM GRANULOCYTES NFR BLD AUTO: 0.3 % — SIGNIFICANT CHANGE UP (ref 0–0.9)
INR BLD: 0.95 RATIO — SIGNIFICANT CHANGE UP (ref 0.85–1.18)
LYMPHOCYTES # BLD AUTO: 1.58 K/UL — SIGNIFICANT CHANGE UP (ref 1–3.3)
LYMPHOCYTES # BLD AUTO: 23.4 % — SIGNIFICANT CHANGE UP (ref 13–44)
MAGNESIUM SERPL-MCNC: 1.6 MG/DL — SIGNIFICANT CHANGE UP (ref 1.6–2.6)
MCHC RBC-ENTMCNC: 29.9 PG — SIGNIFICANT CHANGE UP (ref 27–34)
MCHC RBC-ENTMCNC: 33.7 GM/DL — SIGNIFICANT CHANGE UP (ref 32–36)
MCV RBC AUTO: 88.7 FL — SIGNIFICANT CHANGE UP (ref 80–100)
MONOCYTES # BLD AUTO: 0.52 K/UL — SIGNIFICANT CHANGE UP (ref 0–0.9)
MONOCYTES NFR BLD AUTO: 7.7 % — SIGNIFICANT CHANGE UP (ref 2–14)
NEUTROPHILS # BLD AUTO: 4.27 K/UL — SIGNIFICANT CHANGE UP (ref 1.8–7.4)
NEUTROPHILS NFR BLD AUTO: 63.4 % — SIGNIFICANT CHANGE UP (ref 43–77)
PHOSPHATE SERPL-MCNC: 4.4 MG/DL — SIGNIFICANT CHANGE UP (ref 2.4–4.7)
PLATELET # BLD AUTO: 174 K/UL — SIGNIFICANT CHANGE UP (ref 150–400)
POTASSIUM SERPL-MCNC: 4 MMOL/L — SIGNIFICANT CHANGE UP (ref 3.5–5.3)
POTASSIUM SERPL-SCNC: 4 MMOL/L — SIGNIFICANT CHANGE UP (ref 3.5–5.3)
PROT SERPL-MCNC: 5.6 G/DL — LOW (ref 6.6–8.7)
PROTHROM AB SERPL-ACNC: 10.6 SEC — SIGNIFICANT CHANGE UP (ref 9.5–13)
RBC # BLD: 4.15 M/UL — SIGNIFICANT CHANGE UP (ref 3.8–5.2)
RBC # FLD: 13.1 % — SIGNIFICANT CHANGE UP (ref 10.3–14.5)
SODIUM SERPL-SCNC: 140 MMOL/L — SIGNIFICANT CHANGE UP (ref 135–145)
WBC # BLD: 6.74 K/UL — SIGNIFICANT CHANGE UP (ref 3.8–10.5)
WBC # FLD AUTO: 6.74 K/UL — SIGNIFICANT CHANGE UP (ref 3.8–10.5)

## 2023-09-29 PROCEDURE — 93016 CV STRESS TEST SUPVJ ONLY: CPT

## 2023-09-29 PROCEDURE — 99231 SBSQ HOSP IP/OBS SF/LOW 25: CPT | Mod: 25

## 2023-09-29 PROCEDURE — 93018 CV STRESS TEST I&R ONLY: CPT

## 2023-09-29 PROCEDURE — 78452 HT MUSCLE IMAGE SPECT MULT: CPT | Mod: 26

## 2023-09-29 RX ORDER — MAGNESIUM SULFATE 500 MG/ML
2 VIAL (ML) INJECTION ONCE
Refills: 0 | Status: COMPLETED | OUTPATIENT
Start: 2023-09-29 | End: 2023-09-29

## 2023-09-29 RX ADMIN — Medication 81 MILLIGRAM(S): at 11:43

## 2023-09-29 RX ADMIN — OXYCODONE HYDROCHLORIDE 10 MILLIGRAM(S): 5 TABLET ORAL at 12:30

## 2023-09-29 RX ADMIN — Medication 1 PATCH: at 07:00

## 2023-09-29 RX ADMIN — Medication 1 PATCH: at 11:43

## 2023-09-29 RX ADMIN — SODIUM CHLORIDE 125 MILLILITER(S): 9 INJECTION, SOLUTION INTRAVENOUS at 05:54

## 2023-09-29 RX ADMIN — HEPARIN SODIUM 5000 UNIT(S): 5000 INJECTION INTRAVENOUS; SUBCUTANEOUS at 12:38

## 2023-09-29 RX ADMIN — HEPARIN SODIUM 5000 UNIT(S): 5000 INJECTION INTRAVENOUS; SUBCUTANEOUS at 21:32

## 2023-09-29 RX ADMIN — Medication 1 APPLICATION(S): at 11:43

## 2023-09-29 RX ADMIN — Medication 1 PATCH: at 21:38

## 2023-09-29 RX ADMIN — OXYCODONE HYDROCHLORIDE 10 MILLIGRAM(S): 5 TABLET ORAL at 01:21

## 2023-09-29 RX ADMIN — OXYCODONE HYDROCHLORIDE 10 MILLIGRAM(S): 5 TABLET ORAL at 11:43

## 2023-09-29 RX ADMIN — Medication 1 PATCH: at 12:36

## 2023-09-29 RX ADMIN — Medication 25 GRAM(S): at 10:00

## 2023-09-29 RX ADMIN — ATORVASTATIN CALCIUM 20 MILLIGRAM(S): 80 TABLET, FILM COATED ORAL at 21:32

## 2023-09-29 RX ADMIN — SODIUM CHLORIDE 125 MILLILITER(S): 9 INJECTION, SOLUTION INTRAVENOUS at 10:00

## 2023-09-29 RX ADMIN — HEPARIN SODIUM 5000 UNIT(S): 5000 INJECTION INTRAVENOUS; SUBCUTANEOUS at 05:54

## 2023-09-29 RX ADMIN — OXYCODONE HYDROCHLORIDE 10 MILLIGRAM(S): 5 TABLET ORAL at 18:33

## 2023-09-29 RX ADMIN — Medication 1 APPLICATION(S): at 11:44

## 2023-09-29 RX ADMIN — OXYCODONE HYDROCHLORIDE 10 MILLIGRAM(S): 5 TABLET ORAL at 19:03

## 2023-09-29 RX ADMIN — LISINOPRIL 2.5 MILLIGRAM(S): 2.5 TABLET ORAL at 05:54

## 2023-09-29 RX ADMIN — SENNA PLUS 2 TABLET(S): 8.6 TABLET ORAL at 21:32

## 2023-09-29 NOTE — PROGRESS NOTE ADULT - ASSESSMENT
63 year old female with hx of PAD, presenting with RLE rest pain.     plan  1. OR on 10/2 for iliac angioplasty and stent, femoral-popliteal bypass  2. Pre-op 10/1  3. F/U with cardiology w/ results of NST  4. Continue f/u with vascular surgery  5. Continue wound care of RLE Assessment: Patient is a 63 year old female with hx of PAD, presenting with RLE rest pain.     Plan:  -OR on 10/2 for iliac angioplasty and stent, femoral-popliteal bypass  - Pre-op 10/1  - F/U with cardiology w/ results of NST  -local wound care with silvadine, elevation and dressing for RLE wound  -Serial neurovasc exams  -Pain control

## 2023-09-29 NOTE — PROGRESS NOTE ADULT - SUBJECTIVE AND OBJECTIVE BOX
Patient seen and examined at bedside. No acute events overnight. Reporting some right leg pain at rest controlled with pain regimen. Denies fever, chills, numbness, tingling. No other acute complaints.    MEDICATIONS  (STANDING):  aspirin  chewable 81 milliGRAM(s) Oral daily  atorvastatin 20 milliGRAM(s) Oral at bedtime  collagenase Ointment 1 Application(s) Topical daily  heparin   Injectable 5000 Unit(s) SubCutaneous every 8 hours  lactated ringers. 1000 milliLiter(s) (125 mL/Hr) IV Continuous <Continuous>  lisinopril 2.5 milliGRAM(s) Oral daily  nicotine -  14 mG/24Hr(s) Patch 1 Patch Transdermal daily  senna 2 Tablet(s) Oral at bedtime    Vital Signs Last 24 Hrs  T(C): 36.6 (29 Sep 2023 04:31), Max: 36.8 (28 Sep 2023 15:50)  T(F): 97.9 (29 Sep 2023 04:31), Max: 98.2 (28 Sep 2023 15:50)  HR: 76 (29 Sep 2023 04:31) (72 - 78)  BP: 157/71 (29 Sep 2023 04:31) (132/79 - 157/71)  BP(mean): --  RR: 18 (29 Sep 2023 04:31) (18 - 18)  SpO2: 92% (29 Sep 2023 04:31) (92% - 95%)    Parameters below as of 29 Sep 2023 04:31  Patient On (Oxygen Delivery Method): room air                          12.4   6.74  )-----------( 174      ( 29 Sep 2023 05:35 )             36.8   09-29      140  |  106  |  12.5  ----------------------------<  106<H>  4.0   |  23.0  |  0.61    Ca    8.7      29 Sep 2023 05:35  Phos  4.4     09-29  Mg     1.6     09-29    TPro  5.6<L>  /  Alb  3.2<L>  /  TBili  0.4  /  DBili  x   /  AST  13  /  ALT  11  /  AlkPhos  85  09-29      PHYSICAL EXAM:    General: A&0x4, NAD  Lungs: Non labored breathings  abd: Non-tender, non-distended  Heart: NRR  Neuro: no focal deflects   Extremities:  1+ palpable pulse in R DP, 2+ palpable pulse in L DP.  Skin: Small, quarter size wound on lateral aspect of RLE. No edema or erythema.                    Patient seen and examined at bedside. No acute events overnight. Reporting some mild right leg pain. Denies fever, chills, numbness, tingling. No other acute complaints.    MEDICATIONS  (STANDING):  aspirin  chewable 81 milliGRAM(s) Oral daily  atorvastatin 20 milliGRAM(s) Oral at bedtime  collagenase Ointment 1 Application(s) Topical daily  heparin   Injectable 5000 Unit(s) SubCutaneous every 8 hours  lactated ringers. 1000 milliLiter(s) (125 mL/Hr) IV Continuous <Continuous>  lisinopril 2.5 milliGRAM(s) Oral daily  nicotine -  14 mG/24Hr(s) Patch 1 Patch Transdermal daily  senna 2 Tablet(s) Oral at bedtime    Vital Signs Last 24 Hrs  T(C): 36.6 (29 Sep 2023 04:31), Max: 36.8 (28 Sep 2023 15:50)  T(F): 97.9 (29 Sep 2023 04:31), Max: 98.2 (28 Sep 2023 15:50)  HR: 76 (29 Sep 2023 04:31) (72 - 78)  BP: 157/71 (29 Sep 2023 04:31) (132/79 - 157/71)  BP(mean): --  RR: 18 (29 Sep 2023 04:31) (18 - 18)  SpO2: 92% (29 Sep 2023 04:31) (92% - 95%)    Parameters below as of 29 Sep 2023 04:31  Patient On (Oxygen Delivery Method): room air                          12.4   6.74  )-----------( 174      ( 29 Sep 2023 05:35 )             36.8   09-29      140  |  106  |  12.5  ----------------------------<  106<H>  4.0   |  23.0  |  0.61    Ca    8.7      29 Sep 2023 05:35  Phos  4.4     09-29  Mg     1.6     09-29    TPro  5.6<L>  /  Alb  3.2<L>  /  TBili  0.4  /  DBili  x   /  AST  13  /  ALT  11  /  AlkPhos  85  09-29      PHYSICAL EXAM:    General: A&0x4, NAD  Lungs: Non labored breathings  abd: Non-tender, non-distended  Heart: NRR  Neuro: no focal deflects   Extremities: Right DP/PT w/ triphasic signals. Left DP monophasic signal, left PT did not have a signal  Skin: Small, quarter size wound on lateral aspect of RLE. Mild surrounding erythema. 1+ b/l LE edema.

## 2023-09-29 NOTE — PROGRESS NOTE ADULT - SUBJECTIVE AND OBJECTIVE BOX
KARLA TOTH  [id number]      Chief Complaint:  PAD/Preop Eval    Interval History:  Patient resting comfortably without c/o.          acetaminophen     Tablet .. 650 milliGRAM(s) Oral every 6 hours PRN  aluminum hydroxide/magnesium hydroxide/simethicone Suspension 30 milliLiter(s) Oral every 4 hours PRN  aspirin  chewable 81 milliGRAM(s) Oral daily  atorvastatin 20 milliGRAM(s) Oral at bedtime  collagenase Ointment 1 Application(s) Topical daily  heparin   Injectable 5000 Unit(s) SubCutaneous every 8 hours  HYDROmorphone  Injectable 0.5 milliGRAM(s) IV Push every 4 hours PRN  lisinopril 2.5 milliGRAM(s) Oral daily  nicotine -  14 mG/24Hr(s) Patch 1 Patch Transdermal daily  ondansetron Injectable 4 milliGRAM(s) IV Push every 6 hours PRN  oxyCODONE    IR 10 milliGRAM(s) Oral every 4 hours PRN  oxyCODONE    IR 5 milliGRAM(s) Oral every 4 hours PRN  senna 2 Tablet(s) Oral at bedtime  silver sulfADIAZINE 1% Cream 1 Application(s) Topical daily          Physical Exam:  T(C): 36.6 (09-29-23 @ 04:31), Max: 36.8 (09-28-23 @ 15:50)  HR: 76 (09-29-23 @ 04:31) (72 - 78)  BP: 157/71 (09-29-23 @ 04:31) (132/79 - 157/71)  RR: 18 (09-29-23 @ 04:31) (18 - 18)  SpO2: 92% (09-29-23 @ 04:31) (92% - 95%)  General: Comfortable in NAD  Neck: No JVD  CVS: nl s1s2, no s3  Pulm: CTA b/l  Abd: soft, non-tender  Ext: No c/c/e  Neuro A&O x3  Psych: Normal affect      Labs:   29 Sep 2023 05:35    140    |  106    |  12.5   ----------------------------<  106    4.0     |  23.0   |  0.61     Ca    8.7        29 Sep 2023 05:35  Phos  4.4       29 Sep 2023 05:35  Mg     1.6       29 Sep 2023 05:35    TPro  5.6    /  Alb  3.2    /  TBili  0.4    /  DBili  x      /  AST  13     /  ALT  11     /  AlkPhos  85     29 Sep 2023 05:35                          12.4   6.74  )-----------( 174      ( 29 Sep 2023 05:35 )             36.8     PT/INR - ( 29 Sep 2023 05:35 )   PT: 10.6 sec;   INR: 0.95 ratio         PTT - ( 29 Sep 2023 05:35 )  PTT:31.1 sec      Nuclear STress Test:  * Normal nuclear stress test without evidence of ischemia or infarct.  * There are small, mild to moderate defects in basal anteroseptal, basal inferoseptal walls that are fixed  consistent with artifactfrom differential breast positioning.  * Normal LV size.  * Normal LV function.  * Post-stress resting myocardial perfusion gated SPECT  imaging was performed (LVEF = 60 %)  * The left ventricle was normal LV size.  * There are small, mild to moderate defects in basal anteroseptal, basal inferoseptal walls that are fixed consistent with artifact from differential breast  positioning.      Echo:   1. The left atrium is normal in size.   2. Normal wall motion. Left ventricular ejection fraction, by visual estimation, is 60 to 65%.   3. The right atrium is normal in size.   4. Normal right ventricular size and function.   5. Mild aortic valve stenosis.   6. Mild tricuspid regurgitation.   7. There is no evidence of pericardialeffusion.      Assessment:  63 F smoker, with hyperlipidemia s/p left sided CFA to [place] pop bypass 7/2019 with Dr. Burton. Presenting with persistent rest pain. Pt states that she has had this pain for several weeks and it is worse in the right leg. She has a hx of revascularization in the L leg. She is known to Dr. Blair and recently presented to his office for this pain and was advised to go to the ED If the pain did not resolve. She reports that she is unable to sleep at night and it is affecting her work and her ability to do her job.  Cardiology consultation requested for possible bypass of lower ext. Patient denies CV symptoms at this time.  -Echo with normal EF, mild AS, no other significant abnormality.  -Nuke with no ischemia and normal EF.  -Patient is planned for an intermediate to high risk surgical intervention (vascular bypass).  No CV contraindication at this time.    Plan:  1. Proceed to OR.  2. Periop monitor.  3. Continue current CV meds at current doses.  4. No additional CV testing.    Will sign off.  Please call with questions.  Thanks!

## 2023-09-30 LAB
ANION GAP SERPL CALC-SCNC: 11 MMOL/L — SIGNIFICANT CHANGE UP (ref 5–17)
BASOPHILS # BLD AUTO: 0.04 K/UL — SIGNIFICANT CHANGE UP (ref 0–0.2)
BASOPHILS NFR BLD AUTO: 0.6 % — SIGNIFICANT CHANGE UP (ref 0–2)
BUN SERPL-MCNC: 13.6 MG/DL — SIGNIFICANT CHANGE UP (ref 8–20)
CALCIUM SERPL-MCNC: 9.1 MG/DL — SIGNIFICANT CHANGE UP (ref 8.4–10.5)
CHLORIDE SERPL-SCNC: 106 MMOL/L — SIGNIFICANT CHANGE UP (ref 96–108)
CO2 SERPL-SCNC: 26 MMOL/L — SIGNIFICANT CHANGE UP (ref 22–29)
CREAT SERPL-MCNC: 0.71 MG/DL — SIGNIFICANT CHANGE UP (ref 0.5–1.3)
EGFR: 95 ML/MIN/1.73M2 — SIGNIFICANT CHANGE UP
EOSINOPHIL # BLD AUTO: 0.32 K/UL — SIGNIFICANT CHANGE UP (ref 0–0.5)
EOSINOPHIL NFR BLD AUTO: 5 % — SIGNIFICANT CHANGE UP (ref 0–6)
GLUCOSE SERPL-MCNC: 90 MG/DL — SIGNIFICANT CHANGE UP (ref 70–99)
HCT VFR BLD CALC: 37.7 % — SIGNIFICANT CHANGE UP (ref 34.5–45)
HGB BLD-MCNC: 12.7 G/DL — SIGNIFICANT CHANGE UP (ref 11.5–15.5)
IMM GRANULOCYTES NFR BLD AUTO: 0.3 % — SIGNIFICANT CHANGE UP (ref 0–0.9)
LYMPHOCYTES # BLD AUTO: 1.85 K/UL — SIGNIFICANT CHANGE UP (ref 1–3.3)
LYMPHOCYTES # BLD AUTO: 29 % — SIGNIFICANT CHANGE UP (ref 13–44)
MAGNESIUM SERPL-MCNC: 1.7 MG/DL — SIGNIFICANT CHANGE UP (ref 1.6–2.6)
MCHC RBC-ENTMCNC: 30.1 PG — SIGNIFICANT CHANGE UP (ref 27–34)
MCHC RBC-ENTMCNC: 33.7 GM/DL — SIGNIFICANT CHANGE UP (ref 32–36)
MCV RBC AUTO: 89.3 FL — SIGNIFICANT CHANGE UP (ref 80–100)
MONOCYTES # BLD AUTO: 0.62 K/UL — SIGNIFICANT CHANGE UP (ref 0–0.9)
MONOCYTES NFR BLD AUTO: 9.7 % — SIGNIFICANT CHANGE UP (ref 2–14)
NEUTROPHILS # BLD AUTO: 3.52 K/UL — SIGNIFICANT CHANGE UP (ref 1.8–7.4)
NEUTROPHILS NFR BLD AUTO: 55.4 % — SIGNIFICANT CHANGE UP (ref 43–77)
PHOSPHATE SERPL-MCNC: 4.4 MG/DL — SIGNIFICANT CHANGE UP (ref 2.4–4.7)
PLATELET # BLD AUTO: 176 K/UL — SIGNIFICANT CHANGE UP (ref 150–400)
POTASSIUM SERPL-MCNC: 4.7 MMOL/L — SIGNIFICANT CHANGE UP (ref 3.5–5.3)
POTASSIUM SERPL-SCNC: 4.7 MMOL/L — SIGNIFICANT CHANGE UP (ref 3.5–5.3)
RBC # BLD: 4.22 M/UL — SIGNIFICANT CHANGE UP (ref 3.8–5.2)
RBC # FLD: 13.2 % — SIGNIFICANT CHANGE UP (ref 10.3–14.5)
SODIUM SERPL-SCNC: 143 MMOL/L — SIGNIFICANT CHANGE UP (ref 135–145)
WBC # BLD: 6.37 K/UL — SIGNIFICANT CHANGE UP (ref 3.8–10.5)
WBC # FLD AUTO: 6.37 K/UL — SIGNIFICANT CHANGE UP (ref 3.8–10.5)

## 2023-09-30 PROCEDURE — 99232 SBSQ HOSP IP/OBS MODERATE 35: CPT

## 2023-09-30 RX ORDER — HYDROMORPHONE HYDROCHLORIDE 2 MG/ML
1 INJECTION INTRAMUSCULAR; INTRAVENOUS; SUBCUTANEOUS EVERY 4 HOURS
Refills: 0 | Status: DISCONTINUED | OUTPATIENT
Start: 2023-09-30 | End: 2023-10-02

## 2023-09-30 RX ORDER — MAGNESIUM SULFATE 500 MG/ML
2 VIAL (ML) INJECTION ONCE
Refills: 0 | Status: COMPLETED | OUTPATIENT
Start: 2023-09-30 | End: 2023-09-30

## 2023-09-30 RX ORDER — KETOROLAC TROMETHAMINE 30 MG/ML
15 SYRINGE (ML) INJECTION EVERY 6 HOURS
Refills: 0 | Status: DISCONTINUED | OUTPATIENT
Start: 2023-09-30 | End: 2023-10-01

## 2023-09-30 RX ADMIN — Medication 15 MILLIGRAM(S): at 14:02

## 2023-09-30 RX ADMIN — Medication 1 PATCH: at 11:50

## 2023-09-30 RX ADMIN — Medication 1 PATCH: at 11:00

## 2023-09-30 RX ADMIN — HEPARIN SODIUM 5000 UNIT(S): 5000 INJECTION INTRAVENOUS; SUBCUTANEOUS at 14:06

## 2023-09-30 RX ADMIN — OXYCODONE HYDROCHLORIDE 10 MILLIGRAM(S): 5 TABLET ORAL at 03:02

## 2023-09-30 RX ADMIN — HEPARIN SODIUM 5000 UNIT(S): 5000 INJECTION INTRAVENOUS; SUBCUTANEOUS at 21:58

## 2023-09-30 RX ADMIN — Medication 81 MILLIGRAM(S): at 11:48

## 2023-09-30 RX ADMIN — OXYCODONE HYDROCHLORIDE 10 MILLIGRAM(S): 5 TABLET ORAL at 06:20

## 2023-09-30 RX ADMIN — HEPARIN SODIUM 5000 UNIT(S): 5000 INJECTION INTRAVENOUS; SUBCUTANEOUS at 05:39

## 2023-09-30 RX ADMIN — Medication 1 PATCH: at 06:58

## 2023-09-30 RX ADMIN — Medication 15 MILLIGRAM(S): at 17:17

## 2023-09-30 RX ADMIN — Medication 25 GRAM(S): at 17:17

## 2023-09-30 RX ADMIN — Medication 1 PATCH: at 20:31

## 2023-09-30 RX ADMIN — SENNA PLUS 2 TABLET(S): 8.6 TABLET ORAL at 21:57

## 2023-09-30 RX ADMIN — ATORVASTATIN CALCIUM 20 MILLIGRAM(S): 80 TABLET, FILM COATED ORAL at 21:57

## 2023-09-30 RX ADMIN — Medication 15 MILLIGRAM(S): at 18:31

## 2023-09-30 RX ADMIN — Medication 15 MILLIGRAM(S): at 23:03

## 2023-09-30 RX ADMIN — Medication 15 MILLIGRAM(S): at 11:49

## 2023-09-30 RX ADMIN — Medication 1 APPLICATION(S): at 11:51

## 2023-09-30 RX ADMIN — LISINOPRIL 2.5 MILLIGRAM(S): 2.5 TABLET ORAL at 05:39

## 2023-09-30 NOTE — PROGRESS NOTE ADULT - SUBJECTIVE AND OBJECTIVE BOX
Patient seen and examined at bedside. No acute events overnight. Reporting some  right leg pain. Denies sensation changes weakness, fever    MEDICATIONS  (STANDING):  aspirin  chewable 81 milliGRAM(s) Oral daily  atorvastatin 20 milliGRAM(s) Oral at bedtime  collagenase Ointment 1 Application(s) Topical daily  heparin   Injectable 5000 Unit(s) SubCutaneous every 8 hours  ketorolac   Injectable 15 milliGRAM(s) IV Push every 6 hours  lisinopril 2.5 milliGRAM(s) Oral daily  nicotine -  14 mG/24Hr(s) Patch 1 Patch Transdermal daily  senna 2 Tablet(s) Oral at bedtime  silver sulfADIAZINE 1% Cream 1 Application(s) Topical daily    MEDICATIONS  (PRN):  acetaminophen     Tablet .. 650 milliGRAM(s) Oral every 6 hours PRN Temp greater or equal to 38C (100.4F), Mild Pain (1 - 3)  aluminum hydroxide/magnesium hydroxide/simethicone Suspension 30 milliLiter(s) Oral every 4 hours PRN Dyspepsia  HYDROmorphone  Injectable 1 milliGRAM(s) IV Push every 4 hours PRN Severe Pain (7 - 10)  ondansetron Injectable 4 milliGRAM(s) IV Push every 6 hours PRN Nausea  oxyCODONE    IR 5 milliGRAM(s) Oral every 4 hours PRN Moderate Pain (4 - 6)  oxyCODONE    IR 10 milliGRAM(s) Oral every 4 hours PRN Severe Pain (7 - 10)      Vital Signs Last 24 Hrs  T(C): 37.1 (30 Sep 2023 08:19), Max: 37.1 (30 Sep 2023 08:19)  T(F): 98.8 (30 Sep 2023 08:19), Max: 98.8 (30 Sep 2023 08:19)  HR: 74 (30 Sep 2023 08:19) (69 - 81)  BP: 158/79 (30 Sep 2023 08:19) (123/76 - 160/87)  BP(mean): --  RR: 18 (30 Sep 2023 08:19) (18 - 18)  SpO2: 90% (30 Sep 2023 08:19) (90% - 91%)    Parameters below as of 30 Sep 2023 08:19  Patient On (Oxygen Delivery Method): room air               PHYSICAL EXAM:    General: A&0x4, NAD  Lungs: Non labored breathings  abd: Non-tender, non-distended  Heart: NRR  Neuro: no focal deflects   Extremities: Right DP/PT w/  signals. Left DP monophasic signal, left PT did not have a signal  Skin: Small, quarter size wound on lateral aspect of RLE. Mild surrounding erythema. 1+ b/l LE edema.         labs                        12.7   6.37  )-----------( 176      ( 30 Sep 2023 07:51 )             37.7   09-30    143  |  106  |  13.6  ----------------------------<  90  4.7   |  26.0  |  0.71    Ca    9.1      30 Sep 2023 07:51  Phos  4.4     09-30  Mg     1.7     09-30    TPro  5.6<L>  /  Alb  3.2<L>  /  TBili  0.4  /  DBili  x   /  AST  13  /  ALT  11  /  AlkPhos  85  09-29

## 2023-09-30 NOTE — PROGRESS NOTE ADULT - ASSESSMENT
Assessment: Patient is a 63 year old female with hx of PAD, presenting with RLE rest pain.     Plan:  -OR on 10/2 for iliac angioplasty and stent, femoral-popliteal bypass  - Pre-op 10/1  - NST reviewed  -local wound care with silvadine, elevation and dressing for RLE wound  -Serial neurovasc exams  -Pain control  used

## 2023-10-01 ENCOUNTER — TRANSCRIPTION ENCOUNTER (OUTPATIENT)
Age: 63
End: 2023-10-01

## 2023-10-01 LAB
ANION GAP SERPL CALC-SCNC: 11 MMOL/L — SIGNIFICANT CHANGE UP (ref 5–17)
BASOPHILS # BLD AUTO: 0.03 K/UL — SIGNIFICANT CHANGE UP (ref 0–0.2)
BASOPHILS NFR BLD AUTO: 0.5 % — SIGNIFICANT CHANGE UP (ref 0–2)
BUN SERPL-MCNC: 18.4 MG/DL — SIGNIFICANT CHANGE UP (ref 8–20)
CALCIUM SERPL-MCNC: 8.9 MG/DL — SIGNIFICANT CHANGE UP (ref 8.4–10.5)
CHLORIDE SERPL-SCNC: 104 MMOL/L — SIGNIFICANT CHANGE UP (ref 96–108)
CO2 SERPL-SCNC: 23 MMOL/L — SIGNIFICANT CHANGE UP (ref 22–29)
CREAT SERPL-MCNC: 0.74 MG/DL — SIGNIFICANT CHANGE UP (ref 0.5–1.3)
EGFR: 91 ML/MIN/1.73M2 — SIGNIFICANT CHANGE UP
EOSINOPHIL # BLD AUTO: 0.3 K/UL — SIGNIFICANT CHANGE UP (ref 0–0.5)
EOSINOPHIL NFR BLD AUTO: 5.2 % — SIGNIFICANT CHANGE UP (ref 0–6)
GLUCOSE SERPL-MCNC: 102 MG/DL — HIGH (ref 70–99)
HCT VFR BLD CALC: 36.3 % — SIGNIFICANT CHANGE UP (ref 34.5–45)
HGB BLD-MCNC: 11.8 G/DL — SIGNIFICANT CHANGE UP (ref 11.5–15.5)
IMM GRANULOCYTES NFR BLD AUTO: 0.5 % — SIGNIFICANT CHANGE UP (ref 0–0.9)
LYMPHOCYTES # BLD AUTO: 1.35 K/UL — SIGNIFICANT CHANGE UP (ref 1–3.3)
LYMPHOCYTES # BLD AUTO: 23.4 % — SIGNIFICANT CHANGE UP (ref 13–44)
MAGNESIUM SERPL-MCNC: 1.9 MG/DL — SIGNIFICANT CHANGE UP (ref 1.6–2.6)
MCHC RBC-ENTMCNC: 29.1 PG — SIGNIFICANT CHANGE UP (ref 27–34)
MCHC RBC-ENTMCNC: 32.5 GM/DL — SIGNIFICANT CHANGE UP (ref 32–36)
MCV RBC AUTO: 89.4 FL — SIGNIFICANT CHANGE UP (ref 80–100)
MONOCYTES # BLD AUTO: 0.53 K/UL — SIGNIFICANT CHANGE UP (ref 0–0.9)
MONOCYTES NFR BLD AUTO: 9.2 % — SIGNIFICANT CHANGE UP (ref 2–14)
NEUTROPHILS # BLD AUTO: 3.53 K/UL — SIGNIFICANT CHANGE UP (ref 1.8–7.4)
NEUTROPHILS NFR BLD AUTO: 61.2 % — SIGNIFICANT CHANGE UP (ref 43–77)
PHOSPHATE SERPL-MCNC: 4.1 MG/DL — SIGNIFICANT CHANGE UP (ref 2.4–4.7)
PLATELET # BLD AUTO: 167 K/UL — SIGNIFICANT CHANGE UP (ref 150–400)
POTASSIUM SERPL-MCNC: 4.5 MMOL/L — SIGNIFICANT CHANGE UP (ref 3.5–5.3)
POTASSIUM SERPL-SCNC: 4.5 MMOL/L — SIGNIFICANT CHANGE UP (ref 3.5–5.3)
RBC # BLD: 4.06 M/UL — SIGNIFICANT CHANGE UP (ref 3.8–5.2)
RBC # FLD: 12.6 % — SIGNIFICANT CHANGE UP (ref 10.3–14.5)
SODIUM SERPL-SCNC: 138 MMOL/L — SIGNIFICANT CHANGE UP (ref 135–145)
WBC # BLD: 5.77 K/UL — SIGNIFICANT CHANGE UP (ref 3.8–10.5)
WBC # FLD AUTO: 5.77 K/UL — SIGNIFICANT CHANGE UP (ref 3.8–10.5)

## 2023-10-01 PROCEDURE — 99232 SBSQ HOSP IP/OBS MODERATE 35: CPT

## 2023-10-01 RX ORDER — SODIUM CHLORIDE 9 MG/ML
1000 INJECTION, SOLUTION INTRAVENOUS
Refills: 0 | Status: DISCONTINUED | OUTPATIENT
Start: 2023-10-01 | End: 2023-10-02

## 2023-10-01 RX ORDER — MAGNESIUM SULFATE 500 MG/ML
1 VIAL (ML) INJECTION ONCE
Refills: 0 | Status: COMPLETED | OUTPATIENT
Start: 2023-10-01 | End: 2023-10-01

## 2023-10-01 RX ADMIN — Medication 81 MILLIGRAM(S): at 11:12

## 2023-10-01 RX ADMIN — Medication 100 GRAM(S): at 11:13

## 2023-10-01 RX ADMIN — Medication 15 MILLIGRAM(S): at 05:38

## 2023-10-01 RX ADMIN — ATORVASTATIN CALCIUM 20 MILLIGRAM(S): 80 TABLET, FILM COATED ORAL at 21:03

## 2023-10-01 RX ADMIN — Medication 1 APPLICATION(S): at 11:13

## 2023-10-01 RX ADMIN — HEPARIN SODIUM 5000 UNIT(S): 5000 INJECTION INTRAVENOUS; SUBCUTANEOUS at 21:03

## 2023-10-01 RX ADMIN — OXYCODONE HYDROCHLORIDE 10 MILLIGRAM(S): 5 TABLET ORAL at 18:55

## 2023-10-01 RX ADMIN — Medication 1 PATCH: at 19:05

## 2023-10-01 RX ADMIN — Medication 15 MILLIGRAM(S): at 02:11

## 2023-10-01 RX ADMIN — HEPARIN SODIUM 5000 UNIT(S): 5000 INJECTION INTRAVENOUS; SUBCUTANEOUS at 05:38

## 2023-10-01 RX ADMIN — LISINOPRIL 2.5 MILLIGRAM(S): 2.5 TABLET ORAL at 05:38

## 2023-10-01 RX ADMIN — Medication 1 PATCH: at 07:12

## 2023-10-01 RX ADMIN — HYDROMORPHONE HYDROCHLORIDE 1 MILLIGRAM(S): 2 INJECTION INTRAMUSCULAR; INTRAVENOUS; SUBCUTANEOUS at 21:06

## 2023-10-01 RX ADMIN — HEPARIN SODIUM 5000 UNIT(S): 5000 INJECTION INTRAVENOUS; SUBCUTANEOUS at 13:16

## 2023-10-01 RX ADMIN — HYDROMORPHONE HYDROCHLORIDE 1 MILLIGRAM(S): 2 INJECTION INTRAMUSCULAR; INTRAVENOUS; SUBCUTANEOUS at 22:06

## 2023-10-01 RX ADMIN — Medication 1 PATCH: at 11:12

## 2023-10-01 RX ADMIN — Medication 1 PATCH: at 11:22

## 2023-10-01 RX ADMIN — Medication 15 MILLIGRAM(S): at 06:35

## 2023-10-01 NOTE — PROGRESS NOTE ADULT - SUBJECTIVE AND OBJECTIVE BOX
Patient seen and examined at bedside. No acute events overnight.   Pain medications slighlty altered with apparent improvement    MEDICATIONS  (STANDING):  aspirin  chewable 81 milliGRAM(s) Oral daily  atorvastatin 20 milliGRAM(s) Oral at bedtime  collagenase Ointment 1 Application(s) Topical daily  heparin   Injectable 5000 Unit(s) SubCutaneous every 8 hours  ketorolac   Injectable 15 milliGRAM(s) IV Push every 6 hours  lisinopril 2.5 milliGRAM(s) Oral daily  nicotine -  14 mG/24Hr(s) Patch 1 Patch Transdermal daily  senna 2 Tablet(s) Oral at bedtime  silver sulfADIAZINE 1% Cream 1 Application(s) Topical daily    MEDICATIONS  (PRN):  acetaminophen     Tablet .. 650 milliGRAM(s) Oral every 6 hours PRN Temp greater or equal to 38C (100.4F), Mild Pain (1 - 3)  aluminum hydroxide/magnesium hydroxide/simethicone Suspension 30 milliLiter(s) Oral every 4 hours PRN Dyspepsia  HYDROmorphone  Injectable 1 milliGRAM(s) IV Push every 4 hours PRN Severe Pain (7 - 10)  ondansetron Injectable 4 milliGRAM(s) IV Push every 6 hours PRN Nausea  oxyCODONE    IR 10 milliGRAM(s) Oral every 4 hours PRN Severe Pain (7 - 10)  oxyCODONE    IR 5 milliGRAM(s) Oral every 4 hours PRN Moderate Pain (4 - 6)    Vital Signs Last 24 Hrs  T(C): 36.6 (01 Oct 2023 04:35), Max: 37.1 (30 Sep 2023 08:19)  T(F): 97.9 (01 Oct 2023 04:35), Max: 98.8 (30 Sep 2023 08:19)  HR: 76 (01 Oct 2023 04:35) (72 - 78)  BP: 153/85 (01 Oct 2023 04:35) (115/76 - 162/78)  BP(mean): --  RR: 18 (01 Oct 2023 04:35) (18 - 18)  SpO2: 91% (01 Oct 2023 04:35) (90% - 95%)    Parameters below as of 01 Oct 2023 04:35  Patient On (Oxygen Delivery Method): room air                   PHYSICAL EXAM:    General: A&0x4, NAD                    Lungs: Non labored breathings  abd: Non-tender, non-distended  Heart: NRR  Neuro: no focal deflects   Extremities: Right DP/PT w/  signals. Left DP monophasic signal  Skin: Small, quarter size wound on lateral aspect of RLE. Mild surrounding erythema. 1+ b/l LE edema.         labs                            12.7   6.37  )-----------( 176      ( 30 Sep 2023 07:51 )             37.7     09-30    143  |  106  |  13.6  ----------------------------<  90  4.7   |  26.0  |  0.71    Ca    9.1      30 Sep 2023 07:51  Phos  4.4     09-30  Mg     1.7     09-30    TPro  5.6<L>  /  Alb  3.2<L>  /  TBili  0.4  /  DBili  x   /  AST  13  /  ALT  11  /  AlkPhos  85  09-29

## 2023-10-01 NOTE — PATIENT PROFILE ADULT - FALL HARM RISK - HARM RISK INTERVENTIONS
Assistance with ambulation/Assistance OOB with selected safe patient handling equipment/Communicate Risk of Fall with Harm to all staff/Discuss with provider need for PT consult/Monitor gait and stability/Provide patient with walking aids - walker, cane, crutches/Reinforce activity limits and safety measures with patient and family/Tailored Fall Risk Interventions/Use of alarms - bed, chair and/or voice tab/Visual Cue: Yellow wristband and red socks/Bed in lowest position, wheels locked, appropriate side rails in place/Call bell, personal items and telephone in reach/Instruct patient to call for assistance before getting out of bed or chair/Non-slip footwear when patient is out of bed/Junction to call system/Physically safe environment - no spills, clutter or unnecessary equipment/Purposeful Proactive Rounding/Room/bathroom lighting operational, light cord in reach

## 2023-10-01 NOTE — PATIENT PROFILE ADULT - FUNCTIONAL ASSESSMENT - DAILY ACTIVITY 1.
Hysterectomy at [facility] on 4/8/22 with rectocele and cystocele repair.  Has had bowen in since surgery with 1 failed attempt on 4/9/22 to void any significant amount on her own after bowen was removed for a few hours.  Bowen re-inserted since 4/9/22 and surgeon instructed her to have it removed today and do a trial to void.     3 = A little assistance

## 2023-10-01 NOTE — PROGRESS NOTE ADULT - PROBLEM SELECTOR PROBLEM 1
R/O PAD (peripheral artery disease)

## 2023-10-01 NOTE — PROGRESS NOTE ADULT - ASSESSMENT
Assessment: Patient is a 63 year old female with hx of PAD, presenting with RLE rest pain.     Plan:  -OR on 10/2 for iliac angioplasty and stent, femoral-popliteal bypass  - Pre-op 10/1  - NST reviewed  -local wound care with silvadine, elevation and dressing for RLE wound  -Serial neurovasc exams  -Pain control

## 2023-10-01 NOTE — PROGRESS NOTE ADULT - ATTENDING COMMENTS
leg pain the same, no motor or sensory deficits  cardiology clearance appreciated  OR Monday leg pain the same, no motor or sensory deficits  OR Monday, preop today

## 2023-10-02 ENCOUNTER — TRANSCRIPTION ENCOUNTER (OUTPATIENT)
Age: 63
End: 2023-10-02

## 2023-10-02 ENCOUNTER — RESULT REVIEW (OUTPATIENT)
Age: 63
End: 2023-10-02

## 2023-10-02 LAB
ANION GAP SERPL CALC-SCNC: 11 MMOL/L — SIGNIFICANT CHANGE UP (ref 5–17)
APTT BLD: 33.7 SEC — SIGNIFICANT CHANGE UP (ref 24.5–35.6)
BASOPHILS # BLD AUTO: 0.04 K/UL — SIGNIFICANT CHANGE UP (ref 0–0.2)
BASOPHILS NFR BLD AUTO: 0.7 % — SIGNIFICANT CHANGE UP (ref 0–2)
BLD GP AB SCN SERPL QL: SIGNIFICANT CHANGE UP
BUN SERPL-MCNC: 16 MG/DL — SIGNIFICANT CHANGE UP (ref 8–20)
CALCIUM SERPL-MCNC: 8.5 MG/DL — SIGNIFICANT CHANGE UP (ref 8.4–10.5)
CHLORIDE SERPL-SCNC: 105 MMOL/L — SIGNIFICANT CHANGE UP (ref 96–108)
CO2 SERPL-SCNC: 23 MMOL/L — SIGNIFICANT CHANGE UP (ref 22–29)
CREAT SERPL-MCNC: 0.69 MG/DL — SIGNIFICANT CHANGE UP (ref 0.5–1.3)
EGFR: 97 ML/MIN/1.73M2 — SIGNIFICANT CHANGE UP
EOSINOPHIL # BLD AUTO: 0.34 K/UL — SIGNIFICANT CHANGE UP (ref 0–0.5)
EOSINOPHIL NFR BLD AUTO: 5.9 % — SIGNIFICANT CHANGE UP (ref 0–6)
GLUCOSE SERPL-MCNC: 99 MG/DL — SIGNIFICANT CHANGE UP (ref 70–99)
HCT VFR BLD CALC: 34.6 % — SIGNIFICANT CHANGE UP (ref 34.5–45)
HGB BLD-MCNC: 11.3 G/DL — LOW (ref 11.5–15.5)
IMM GRANULOCYTES NFR BLD AUTO: 0.3 % — SIGNIFICANT CHANGE UP (ref 0–0.9)
INR BLD: 0.94 RATIO — SIGNIFICANT CHANGE UP (ref 0.85–1.18)
LYMPHOCYTES # BLD AUTO: 1.9 K/UL — SIGNIFICANT CHANGE UP (ref 1–3.3)
LYMPHOCYTES # BLD AUTO: 33 % — SIGNIFICANT CHANGE UP (ref 13–44)
MAGNESIUM SERPL-MCNC: 1.7 MG/DL — SIGNIFICANT CHANGE UP (ref 1.6–2.6)
MCHC RBC-ENTMCNC: 29 PG — SIGNIFICANT CHANGE UP (ref 27–34)
MCHC RBC-ENTMCNC: 32.7 GM/DL — SIGNIFICANT CHANGE UP (ref 32–36)
MCV RBC AUTO: 88.7 FL — SIGNIFICANT CHANGE UP (ref 80–100)
MONOCYTES # BLD AUTO: 0.57 K/UL — SIGNIFICANT CHANGE UP (ref 0–0.9)
MONOCYTES NFR BLD AUTO: 9.9 % — SIGNIFICANT CHANGE UP (ref 2–14)
NEUTROPHILS # BLD AUTO: 2.88 K/UL — SIGNIFICANT CHANGE UP (ref 1.8–7.4)
NEUTROPHILS NFR BLD AUTO: 50.2 % — SIGNIFICANT CHANGE UP (ref 43–77)
PHOSPHATE SERPL-MCNC: 3.6 MG/DL — SIGNIFICANT CHANGE UP (ref 2.4–4.7)
PLATELET # BLD AUTO: 169 K/UL — SIGNIFICANT CHANGE UP (ref 150–400)
POTASSIUM SERPL-MCNC: 4.3 MMOL/L — SIGNIFICANT CHANGE UP (ref 3.5–5.3)
POTASSIUM SERPL-SCNC: 4.3 MMOL/L — SIGNIFICANT CHANGE UP (ref 3.5–5.3)
PROTHROM AB SERPL-ACNC: 10.5 SEC — SIGNIFICANT CHANGE UP (ref 9.5–13)
RBC # BLD: 3.9 M/UL — SIGNIFICANT CHANGE UP (ref 3.8–5.2)
RBC # FLD: 12.7 % — SIGNIFICANT CHANGE UP (ref 10.3–14.5)
SARS-COV-2 RNA SPEC QL NAA+PROBE: SIGNIFICANT CHANGE UP
SODIUM SERPL-SCNC: 139 MMOL/L — SIGNIFICANT CHANGE UP (ref 135–145)
WBC # BLD: 5.75 K/UL — SIGNIFICANT CHANGE UP (ref 3.8–10.5)
WBC # FLD AUTO: 5.75 K/UL — SIGNIFICANT CHANGE UP (ref 3.8–10.5)

## 2023-10-02 PROCEDURE — 88304 TISSUE EXAM BY PATHOLOGIST: CPT | Mod: 26

## 2023-10-02 PROCEDURE — 88311 DECALCIFY TISSUE: CPT | Mod: 26

## 2023-10-02 PROCEDURE — 75710 ARTERY X-RAYS ARM/LEG: CPT | Mod: 26,GC,59

## 2023-10-02 PROCEDURE — 37226: CPT | Mod: GC,RT

## 2023-10-02 PROCEDURE — 35355 RECHANNELING OF ARTERY: CPT | Mod: GC,RT

## 2023-10-02 PROCEDURE — 93970 EXTREMITY STUDY: CPT | Mod: 26

## 2023-10-02 DEVICE — KIT A-LINE 1LUM 20G X 12CM SAFE KIT: Type: IMPLANTABLE DEVICE | Status: FUNCTIONAL

## 2023-10-02 DEVICE — FLOSEAL WITH RECOTHROM THROMBIN 10ML: Type: IMPLANTABLE DEVICE | Status: FUNCTIONAL

## 2023-10-02 DEVICE — IMPLANTABLE DEVICE: Type: IMPLANTABLE DEVICE | Status: FUNCTIONAL

## 2023-10-02 DEVICE — CLIP APPLIER COVIDIEN SURGICLIP III 9" SM: Type: IMPLANTABLE DEVICE | Status: FUNCTIONAL

## 2023-10-02 DEVICE — INTRO MICROPUNC MPIS 5FRX10CM ECHO NDL: Type: IMPLANTABLE DEVICE | Status: FUNCTIONAL

## 2023-10-02 DEVICE — CATH ANGIO GLIDECATH ANGLE TAPER 5FR X 65CM: Type: IMPLANTABLE DEVICE | Status: FUNCTIONAL

## 2023-10-02 DEVICE — SHEATH INTRODUCER TERUMO PINNACLE PERIPHERAL 7FR X 10CM X 0.035" MINI WIRE: Type: IMPLANTABLE DEVICE | Status: FUNCTIONAL

## 2023-10-02 DEVICE — PATCH PERICARDIAL PHOTOFIX .8X8CM: Type: IMPLANTABLE DEVICE | Status: FUNCTIONAL

## 2023-10-02 DEVICE — GUIDEWIRE GLIDEWIRE ANGLED TIP 0.035" X 180CM STIFF: Type: IMPLANTABLE DEVICE | Status: FUNCTIONAL

## 2023-10-02 DEVICE — GUIDEWIRE RADIFOCUS GLIDEWIRE ANGLED 0.035" X 260CM STIFF: Type: IMPLANTABLE DEVICE | Status: FUNCTIONAL

## 2023-10-02 RX ORDER — FENTANYL CITRATE 50 UG/ML
50 INJECTION INTRAVENOUS
Refills: 0 | Status: DISCONTINUED | OUTPATIENT
Start: 2023-10-02 | End: 2023-10-03

## 2023-10-02 RX ORDER — HYDROMORPHONE HYDROCHLORIDE 2 MG/ML
0.5 INJECTION INTRAMUSCULAR; INTRAVENOUS; SUBCUTANEOUS ONCE
Refills: 0 | Status: DISCONTINUED | OUTPATIENT
Start: 2023-10-02 | End: 2023-10-02

## 2023-10-02 RX ORDER — HYDROMORPHONE HYDROCHLORIDE 2 MG/ML
1 INJECTION INTRAMUSCULAR; INTRAVENOUS; SUBCUTANEOUS EVERY 4 HOURS
Refills: 0 | Status: DISCONTINUED | OUTPATIENT
Start: 2023-10-02 | End: 2023-10-04

## 2023-10-02 RX ORDER — SENNA PLUS 8.6 MG/1
2 TABLET ORAL AT BEDTIME
Refills: 0 | Status: DISCONTINUED | OUTPATIENT
Start: 2023-10-02 | End: 2023-10-05

## 2023-10-02 RX ORDER — CLOPIDOGREL BISULFATE 75 MG/1
75 TABLET, FILM COATED ORAL DAILY
Refills: 0 | Status: DISCONTINUED | OUTPATIENT
Start: 2023-10-03 | End: 2023-10-05

## 2023-10-02 RX ORDER — OXYCODONE HYDROCHLORIDE 5 MG/1
10 TABLET ORAL EVERY 4 HOURS
Refills: 0 | Status: DISCONTINUED | OUTPATIENT
Start: 2023-10-02 | End: 2023-10-04

## 2023-10-02 RX ORDER — CEFAZOLIN SODIUM 1 G
2000 VIAL (EA) INJECTION ONCE
Refills: 0 | Status: DISCONTINUED | OUTPATIENT
Start: 2023-10-02 | End: 2023-10-02

## 2023-10-02 RX ORDER — ATORVASTATIN CALCIUM 80 MG/1
20 TABLET, FILM COATED ORAL AT BEDTIME
Refills: 0 | Status: DISCONTINUED | OUTPATIENT
Start: 2023-10-02 | End: 2023-10-05

## 2023-10-02 RX ORDER — OXYCODONE HYDROCHLORIDE 5 MG/1
5 TABLET ORAL EVERY 4 HOURS
Refills: 0 | Status: DISCONTINUED | OUTPATIENT
Start: 2023-10-02 | End: 2023-10-04

## 2023-10-02 RX ORDER — LISINOPRIL 2.5 MG/1
2.5 TABLET ORAL DAILY
Refills: 0 | Status: DISCONTINUED | OUTPATIENT
Start: 2023-10-02 | End: 2023-10-05

## 2023-10-02 RX ORDER — ACETAMINOPHEN 500 MG
975 TABLET ORAL EVERY 6 HOURS
Refills: 0 | Status: DISCONTINUED | OUTPATIENT
Start: 2023-10-02 | End: 2023-10-03

## 2023-10-02 RX ORDER — ONDANSETRON 8 MG/1
4 TABLET, FILM COATED ORAL ONCE
Refills: 0 | Status: DISCONTINUED | OUTPATIENT
Start: 2023-10-02 | End: 2023-10-03

## 2023-10-02 RX ORDER — NICOTINE POLACRILEX 2 MG
1 GUM BUCCAL DAILY
Refills: 0 | Status: DISCONTINUED | OUTPATIENT
Start: 2023-10-02 | End: 2023-10-05

## 2023-10-02 RX ORDER — ACETAMINOPHEN 500 MG
650 TABLET ORAL EVERY 6 HOURS
Refills: 0 | Status: DISCONTINUED | OUTPATIENT
Start: 2023-10-02 | End: 2023-10-02

## 2023-10-02 RX ORDER — SODIUM CHLORIDE 9 MG/ML
1000 INJECTION, SOLUTION INTRAVENOUS
Refills: 0 | Status: DISCONTINUED | OUTPATIENT
Start: 2023-10-02 | End: 2023-10-03

## 2023-10-02 RX ORDER — ASPIRIN/CALCIUM CARB/MAGNESIUM 324 MG
81 TABLET ORAL DAILY
Refills: 0 | Status: DISCONTINUED | OUTPATIENT
Start: 2023-10-02 | End: 2023-10-05

## 2023-10-02 RX ORDER — ONDANSETRON 8 MG/1
4 TABLET, FILM COATED ORAL EVERY 6 HOURS
Refills: 0 | Status: DISCONTINUED | OUTPATIENT
Start: 2023-10-02 | End: 2023-10-02

## 2023-10-02 RX ORDER — HEPARIN SODIUM 5000 [USP'U]/ML
5000 INJECTION INTRAVENOUS; SUBCUTANEOUS EVERY 8 HOURS
Refills: 0 | Status: DISCONTINUED | OUTPATIENT
Start: 2023-10-02 | End: 2023-10-05

## 2023-10-02 RX ORDER — CLOPIDOGREL BISULFATE 75 MG/1
300 TABLET, FILM COATED ORAL ONCE
Refills: 0 | Status: COMPLETED | OUTPATIENT
Start: 2023-10-02 | End: 2023-10-02

## 2023-10-02 RX ORDER — COLLAGENASE CLOSTRIDIUM HIST. 250 UNIT/G
1 OINTMENT (GRAM) TOPICAL DAILY
Refills: 0 | Status: DISCONTINUED | OUTPATIENT
Start: 2023-10-02 | End: 2023-10-03

## 2023-10-02 RX ADMIN — FENTANYL CITRATE 50 MICROGRAM(S): 50 INJECTION INTRAVENOUS at 23:33

## 2023-10-02 RX ADMIN — HEPARIN SODIUM 5000 UNIT(S): 5000 INJECTION INTRAVENOUS; SUBCUTANEOUS at 14:36

## 2023-10-02 RX ADMIN — LISINOPRIL 2.5 MILLIGRAM(S): 2.5 TABLET ORAL at 05:27

## 2023-10-02 RX ADMIN — Medication 1 PATCH: at 07:30

## 2023-10-02 RX ADMIN — OXYCODONE HYDROCHLORIDE 10 MILLIGRAM(S): 5 TABLET ORAL at 09:30

## 2023-10-02 RX ADMIN — SODIUM CHLORIDE 100 MILLILITER(S): 9 INJECTION, SOLUTION INTRAVENOUS at 08:31

## 2023-10-02 RX ADMIN — Medication 1 PATCH: at 12:09

## 2023-10-02 RX ADMIN — HYDROMORPHONE HYDROCHLORIDE 1 MILLIGRAM(S): 2 INJECTION INTRAMUSCULAR; INTRAVENOUS; SUBCUTANEOUS at 23:57

## 2023-10-02 RX ADMIN — HYDROMORPHONE HYDROCHLORIDE 0.5 MILLIGRAM(S): 2 INJECTION INTRAMUSCULAR; INTRAVENOUS; SUBCUTANEOUS at 12:30

## 2023-10-02 RX ADMIN — Medication 81 MILLIGRAM(S): at 12:08

## 2023-10-02 RX ADMIN — HYDROMORPHONE HYDROCHLORIDE 1 MILLIGRAM(S): 2 INJECTION INTRAMUSCULAR; INTRAVENOUS; SUBCUTANEOUS at 11:00

## 2023-10-02 RX ADMIN — SODIUM CHLORIDE 100 MILLILITER(S): 9 INJECTION, SOLUTION INTRAVENOUS at 00:23

## 2023-10-02 RX ADMIN — HEPARIN SODIUM 5000 UNIT(S): 5000 INJECTION INTRAVENOUS; SUBCUTANEOUS at 05:27

## 2023-10-02 RX ADMIN — HYDROMORPHONE HYDROCHLORIDE 0.5 MILLIGRAM(S): 2 INJECTION INTRAMUSCULAR; INTRAVENOUS; SUBCUTANEOUS at 12:08

## 2023-10-02 RX ADMIN — CLOPIDOGREL BISULFATE 300 MILLIGRAM(S): 75 TABLET, FILM COATED ORAL at 23:33

## 2023-10-02 RX ADMIN — HYDROMORPHONE HYDROCHLORIDE 1 MILLIGRAM(S): 2 INJECTION INTRAMUSCULAR; INTRAVENOUS; SUBCUTANEOUS at 02:07

## 2023-10-02 RX ADMIN — HYDROMORPHONE HYDROCHLORIDE 1 MILLIGRAM(S): 2 INJECTION INTRAMUSCULAR; INTRAVENOUS; SUBCUTANEOUS at 10:23

## 2023-10-02 RX ADMIN — HYDROMORPHONE HYDROCHLORIDE 1 MILLIGRAM(S): 2 INJECTION INTRAMUSCULAR; INTRAVENOUS; SUBCUTANEOUS at 01:07

## 2023-10-02 RX ADMIN — OXYCODONE HYDROCHLORIDE 10 MILLIGRAM(S): 5 TABLET ORAL at 08:28

## 2023-10-02 NOTE — PROGRESS NOTE ADULT - SUBJECTIVE AND OBJECTIVE BOX
Patient seen and examined at bedside. LAKESHA, doing well with stable RLE rest pain.    MEDICATIONS  (STANDING):  aspirin  chewable 81 milliGRAM(s) Oral daily  atorvastatin 20 milliGRAM(s) Oral at bedtime  collagenase Ointment 1 Application(s) Topical daily  heparin   Injectable 5000 Unit(s) SubCutaneous every 8 hours  lactated ringers. 1000 milliLiter(s) (100 mL/Hr) IV Continuous <Continuous>  lisinopril 2.5 milliGRAM(s) Oral daily  nicotine -  14 mG/24Hr(s) Patch 1 Patch Transdermal daily  senna 2 Tablet(s) Oral at bedtime  silver sulfADIAZINE 1% Cream 1 Application(s) Topical daily    MEDICATIONS  (PRN):  acetaminophen     Tablet .. 650 milliGRAM(s) Oral every 6 hours PRN Temp greater or equal to 38C (100.4F), Mild Pain (1 - 3)  aluminum hydroxide/magnesium hydroxide/simethicone Suspension 30 milliLiter(s) Oral every 4 hours PRN Dyspepsia  HYDROmorphone  Injectable 1 milliGRAM(s) IV Push every 4 hours PRN Severe Pain (7 - 10)  ondansetron Injectable 4 milliGRAM(s) IV Push every 6 hours PRN Nausea  oxyCODONE    IR 10 milliGRAM(s) Oral every 4 hours PRN Severe Pain (7 - 10)  oxyCODONE    IR 5 milliGRAM(s) Oral every 4 hours PRN Moderate Pain (4 - 6)      Vital Signs Last 24 Hrs  T(C): 36.6 (02 Oct 2023 00:39), Max: 36.8 (01 Oct 2023 10:54)  T(F): 97.9 (02 Oct 2023 00:39), Max: 98.3 (01 Oct 2023 20:28)  HR: 83 (02 Oct 2023 00:39) (68 - 83)  BP: 147/72 (02 Oct 2023 00:39) (102/82 - 153/85)  BP(mean): 89 (01 Oct 2023 20:28) (89 - 94)  RR: 16 (02 Oct 2023 00:39) (16 - 18)  SpO2: 91% (02 Oct 2023 00:39) (91% - 94%)    Parameters below as of 02 Oct 2023 00:39  Patient On (Oxygen Delivery Method): room air                            11.8   5.77  )-----------( 167      ( 01 Oct 2023 06:51 )             36.3   10-01    138  |  104  |  18.4  ----------------------------<  102<H>  4.5   |  23.0  |  0.74    Ca    8.9      01 Oct 2023 06:51  Phos  4.1     10-01  Mg     1.9     10-01        PHYSICAL EXAM:  General: Resting comfortably in NAD  Lungs: Non labored breathings  abd: Non-tender, non-distended  Heart: NRR  Neuro: no focal deflects   Extremities: Right DP/PT w/  signals. Left DP monophasic signal  Skin: Small, quarter size wound on lateral aspect of RLE. Mild surrounding erythema. 1+ b/l LE edema

## 2023-10-02 NOTE — BRIEF OPERATIVE NOTE - OPERATION/FINDINGS
R femoral artery endarterectomy , R femoral angioplasty and stent placement    Post op : doppler AT , DP signals R groin cutdown   R iliac and femoral artery endarterectomy , R femoral angioplasty and stent placement    Post op : doppler AT , DP signals R groin cutdown   R femoral artery endarterectomy , R femoral angioplasty and stent placement    Post op : doppler AT , DP signals

## 2023-10-02 NOTE — PROGRESS NOTE ADULT - ASSESSMENT
A- Patient is a 62 y/o F presented with RLE rest pain and a non healing wound. CTA showed calcifications/plaques in the common illiac arteres, heavily calcified internal illiacs.  Distal right external illiac evaluation limited by artifact from right hip arthroplasty.      Plan- NPO   OR today  Pain manage clinically  Examine post op    A- Patient is a 64 y/o F presented with RLE rest pain and a non healing wound. CTA showed calcifications/plaques in the common illiac arteres, heavily calcified internal illiacs.  Distal right external illiac evaluation limited by artifact from right hip arthroplasty.      Plan- NPO   OR today  Pain manage clinically  Examine post op       -- Follow up vein mapping  -- plan for operative intervention today at 430pm    changes made to the above note where applicable,  patient seen with the Student

## 2023-10-02 NOTE — PROGRESS NOTE ADULT - SUBJECTIVE AND OBJECTIVE BOX
S-  62 y/o F with RLE rest pain, CLI, non healing RLE Wound. Pt examined bedside, NAEON, NPO since midnight, OR 16:30 10/2/23 for Aortogram R Illiac stenting w/possible Fem Pop bypass          MEDICATIONS  (STANDING):  aspirin  chewable 81 milliGRAM(s) Oral daily  atorvastatin 20 milliGRAM(s) Oral at bedtime  collagenase Ointment 1 Application(s) Topical daily  heparin   Injectable 5000 Unit(s) SubCutaneous every 8 hours  lactated ringers. 1000 milliLiter(s) (100 mL/Hr) IV Continuous <Continuous>  lisinopril 2.5 milliGRAM(s) Oral daily  nicotine -  14 mG/24Hr(s) Patch 1 Patch Transdermal daily  senna 2 Tablet(s) Oral at bedtime  silver sulfADIAZINE 1% Cream 1 Application(s) Topical daily    MEDICATIONS  (PRN):  acetaminophen     Tablet .. 650 milliGRAM(s) Oral every 6 hours PRN Temp greater or equal to 38C (100.4F), Mild Pain (1 - 3)  aluminum hydroxide/magnesium hydroxide/simethicone Suspension 30 milliLiter(s) Oral every 4 hours PRN Dyspepsia  HYDROmorphone  Injectable 1 milliGRAM(s) IV Push every 4 hours PRN Severe Pain (7 - 10)  ondansetron Injectable 4 milliGRAM(s) IV Push every 6 hours PRN Nausea  oxyCODONE    IR 5 milliGRAM(s) Oral every 4 hours PRN Moderate Pain (4 - 6)  oxyCODONE    IR 10 milliGRAM(s) Oral every 4 hours PRN Severe Pain (7 - 10)                          11.3   5.75  )-----------( 169      ( 02 Oct 2023 05:01 )             34.6   10-02    139  |  105  |  16.0  ----------------------------<  99  4.3   |  23.0  |  0.69    Ca    8.5      02 Oct 2023 05:01  Phos  3.6     10-02  Mg     1.7     10-02        O-  Vital Signs Last 24 Hrs  T(C): 37.2 (02 Oct 2023 03:51), Max: 37.2 (02 Oct 2023 03:51)  T(F): 98.9 (02 Oct 2023 03:51), Max: 98.9 (02 Oct 2023 03:51)  HR: 80 (02 Oct 2023 03:51) (68 - 83)  BP: 134/80 (02 Oct 2023 03:51) (102/82 - 147/72)  BP(mean): 89 (01 Oct 2023 20:28) (89 - 94)  RR: 18 (02 Oct 2023 03:51) (16 - 18)  SpO2: 91% (02 Oct 2023 03:51) (91% - 94%)    Parameters below as of 02 Oct 2023 03:51  Patient On (Oxygen Delivery Method): room air    Gen- Pt sleeping in bed, comfortable, no acute distress  Cardio- NRR  Pulm- Non labored breathing , NR  Extremities- +2 DP L, DP,PT non palpable R pulses not found with doppler  Moving all extremities well.  Non healing wound R lateral calf.   S-  64 y/o F with RLE rest pain, CLI, non healing RLE Wound. Pt examined bedside, NAEON, NPO since midnight, OR 16:30 10/2/23 for R Femoral endarterectomy, stenting w/possible Fem Pop bypass          MEDICATIONS  (STANDING):  aspirin  chewable 81 milliGRAM(s) Oral daily  atorvastatin 20 milliGRAM(s) Oral at bedtime  collagenase Ointment 1 Application(s) Topical daily  heparin   Injectable 5000 Unit(s) SubCutaneous every 8 hours  lactated ringers. 1000 milliLiter(s) (100 mL/Hr) IV Continuous <Continuous>  lisinopril 2.5 milliGRAM(s) Oral daily  nicotine -  14 mG/24Hr(s) Patch 1 Patch Transdermal daily  senna 2 Tablet(s) Oral at bedtime  silver sulfADIAZINE 1% Cream 1 Application(s) Topical daily    MEDICATIONS  (PRN):  acetaminophen     Tablet .. 650 milliGRAM(s) Oral every 6 hours PRN Temp greater or equal to 38C (100.4F), Mild Pain (1 - 3)  aluminum hydroxide/magnesium hydroxide/simethicone Suspension 30 milliLiter(s) Oral every 4 hours PRN Dyspepsia  HYDROmorphone  Injectable 1 milliGRAM(s) IV Push every 4 hours PRN Severe Pain (7 - 10)  ondansetron Injectable 4 milliGRAM(s) IV Push every 6 hours PRN Nausea  oxyCODONE    IR 5 milliGRAM(s) Oral every 4 hours PRN Moderate Pain (4 - 6)  oxyCODONE    IR 10 milliGRAM(s) Oral every 4 hours PRN Severe Pain (7 - 10)                          11.3   5.75  )-----------( 169      ( 02 Oct 2023 05:01 )             34.6   10-02    139  |  105  |  16.0  ----------------------------<  99  4.3   |  23.0  |  0.69    Ca    8.5      02 Oct 2023 05:01  Phos  3.6     10-02  Mg     1.7     10-02        O-  Vital Signs Last 24 Hrs  T(C): 37.2 (02 Oct 2023 03:51), Max: 37.2 (02 Oct 2023 03:51)  T(F): 98.9 (02 Oct 2023 03:51), Max: 98.9 (02 Oct 2023 03:51)  HR: 80 (02 Oct 2023 03:51) (68 - 83)  BP: 134/80 (02 Oct 2023 03:51) (102/82 - 147/72)  BP(mean): 89 (01 Oct 2023 20:28) (89 - 94)  RR: 18 (02 Oct 2023 03:51) (16 - 18)  SpO2: 91% (02 Oct 2023 03:51) (91% - 94%)    Parameters below as of 02 Oct 2023 03:51  Patient On (Oxygen Delivery Method): room air    Gen- Pt sleeping in bed, comfortable, no acute distress  Cardio- NRR  Pulm- Non labored breathing , NR  Extremities- +2 DP L.   monophasic, DP,PT R signals found. Moving all extremities well.  Non healing wound R lateral calf.

## 2023-10-02 NOTE — PROGRESS NOTE ADULT - ASSESSMENT
Assessment: Patient is a 63 year old female with hx of PAD, presenting with RLE rest pain.     Plan:  -OR today, 10/2 for iliac angioplasty and stent, femoral-popliteal bypass  - Pre-op 10/1  - NST reviewed  -local wound care with silvadine, elevation and dressing for RLE wound  -Serial neurovasc exams, remain unchanged  -Pain control

## 2023-10-02 NOTE — BRIEF OPERATIVE NOTE - NSICDXBRIEFPROCEDURE_GEN_ALL_CORE_FT
PROCEDURES:  Endarterectomy, common femoral 02-Oct-2023 23:13:07  Mor Kelly  Angioplasty, artery, femoral 02-Oct-2023 23:13:18  Mor Kelly  Femoral artery stent placement 02-Oct-2023 23:13:34  Mor Kelly

## 2023-10-03 ENCOUNTER — TRANSCRIPTION ENCOUNTER (OUTPATIENT)
Age: 63
End: 2023-10-03

## 2023-10-03 LAB
ANION GAP SERPL CALC-SCNC: 10 MMOL/L — SIGNIFICANT CHANGE UP (ref 5–17)
ANION GAP SERPL CALC-SCNC: 13 MMOL/L — SIGNIFICANT CHANGE UP (ref 5–17)
BUN SERPL-MCNC: 11.4 MG/DL — SIGNIFICANT CHANGE UP (ref 8–20)
BUN SERPL-MCNC: 12.1 MG/DL — SIGNIFICANT CHANGE UP (ref 8–20)
CALCIUM SERPL-MCNC: 8.6 MG/DL — SIGNIFICANT CHANGE UP (ref 8.4–10.5)
CALCIUM SERPL-MCNC: 8.8 MG/DL — SIGNIFICANT CHANGE UP (ref 8.4–10.5)
CHLORIDE SERPL-SCNC: 102 MMOL/L — SIGNIFICANT CHANGE UP (ref 96–108)
CHLORIDE SERPL-SCNC: 105 MMOL/L — SIGNIFICANT CHANGE UP (ref 96–108)
CO2 SERPL-SCNC: 22 MMOL/L — SIGNIFICANT CHANGE UP (ref 22–29)
CO2 SERPL-SCNC: 23 MMOL/L — SIGNIFICANT CHANGE UP (ref 22–29)
CREAT SERPL-MCNC: 0.69 MG/DL — SIGNIFICANT CHANGE UP (ref 0.5–1.3)
CREAT SERPL-MCNC: 0.73 MG/DL — SIGNIFICANT CHANGE UP (ref 0.5–1.3)
EGFR: 92 ML/MIN/1.73M2 — SIGNIFICANT CHANGE UP
EGFR: 97 ML/MIN/1.73M2 — SIGNIFICANT CHANGE UP
GLUCOSE SERPL-MCNC: 157 MG/DL — HIGH (ref 70–99)
GLUCOSE SERPL-MCNC: 158 MG/DL — HIGH (ref 70–99)
HCT VFR BLD CALC: 34.8 % — SIGNIFICANT CHANGE UP (ref 34.5–45)
HCT VFR BLD CALC: 36 % — SIGNIFICANT CHANGE UP (ref 34.5–45)
HGB BLD-MCNC: 11.6 G/DL — SIGNIFICANT CHANGE UP (ref 11.5–15.5)
HGB BLD-MCNC: 11.9 G/DL — SIGNIFICANT CHANGE UP (ref 11.5–15.5)
MAGNESIUM SERPL-MCNC: 1.6 MG/DL — LOW (ref 1.8–2.6)
MCHC RBC-ENTMCNC: 29.3 PG — SIGNIFICANT CHANGE UP (ref 27–34)
MCHC RBC-ENTMCNC: 29.5 PG — SIGNIFICANT CHANGE UP (ref 27–34)
MCHC RBC-ENTMCNC: 33.1 GM/DL — SIGNIFICANT CHANGE UP (ref 32–36)
MCHC RBC-ENTMCNC: 33.3 GM/DL — SIGNIFICANT CHANGE UP (ref 32–36)
MCV RBC AUTO: 88.5 FL — SIGNIFICANT CHANGE UP (ref 80–100)
MCV RBC AUTO: 88.7 FL — SIGNIFICANT CHANGE UP (ref 80–100)
PHOSPHATE SERPL-MCNC: 4.2 MG/DL — SIGNIFICANT CHANGE UP (ref 2.4–4.7)
PLATELET # BLD AUTO: 167 K/UL — SIGNIFICANT CHANGE UP (ref 150–400)
PLATELET # BLD AUTO: 178 K/UL — SIGNIFICANT CHANGE UP (ref 150–400)
POTASSIUM SERPL-MCNC: 4 MMOL/L — SIGNIFICANT CHANGE UP (ref 3.5–5.3)
POTASSIUM SERPL-MCNC: 4.4 MMOL/L — SIGNIFICANT CHANGE UP (ref 3.5–5.3)
POTASSIUM SERPL-SCNC: 4 MMOL/L — SIGNIFICANT CHANGE UP (ref 3.5–5.3)
POTASSIUM SERPL-SCNC: 4.4 MMOL/L — SIGNIFICANT CHANGE UP (ref 3.5–5.3)
RBC # BLD: 3.93 M/UL — SIGNIFICANT CHANGE UP (ref 3.8–5.2)
RBC # BLD: 4.06 M/UL — SIGNIFICANT CHANGE UP (ref 3.8–5.2)
RBC # FLD: 12.7 % — SIGNIFICANT CHANGE UP (ref 10.3–14.5)
RBC # FLD: 12.9 % — SIGNIFICANT CHANGE UP (ref 10.3–14.5)
SODIUM SERPL-SCNC: 137 MMOL/L — SIGNIFICANT CHANGE UP (ref 135–145)
SODIUM SERPL-SCNC: 138 MMOL/L — SIGNIFICANT CHANGE UP (ref 135–145)
WBC # BLD: 6.89 K/UL — SIGNIFICANT CHANGE UP (ref 3.8–10.5)
WBC # BLD: 8.07 K/UL — SIGNIFICANT CHANGE UP (ref 3.8–10.5)
WBC # FLD AUTO: 6.89 K/UL — SIGNIFICANT CHANGE UP (ref 3.8–10.5)
WBC # FLD AUTO: 8.07 K/UL — SIGNIFICANT CHANGE UP (ref 3.8–10.5)

## 2023-10-03 RX ORDER — KETOROLAC TROMETHAMINE 30 MG/ML
15 SYRINGE (ML) INJECTION ONCE
Refills: 0 | Status: DISCONTINUED | OUTPATIENT
Start: 2023-10-03 | End: 2023-10-04

## 2023-10-03 RX ORDER — COLLAGENASE CLOSTRIDIUM HIST. 250 UNIT/G
1 OINTMENT (GRAM) TOPICAL
Qty: 1 | Refills: 0
Start: 2023-10-03 | End: 2023-10-30

## 2023-10-03 RX ORDER — COLLAGENASE CLOSTRIDIUM HIST. 250 UNIT/G
1 OINTMENT (GRAM) TOPICAL DAILY
Refills: 0 | Status: DISCONTINUED | OUTPATIENT
Start: 2023-10-03 | End: 2023-10-05

## 2023-10-03 RX ORDER — SODIUM CHLORIDE 9 MG/ML
1000 INJECTION INTRAMUSCULAR; INTRAVENOUS; SUBCUTANEOUS ONCE
Refills: 0 | Status: COMPLETED | OUTPATIENT
Start: 2023-10-03 | End: 2023-10-03

## 2023-10-03 RX ORDER — ACETAMINOPHEN 500 MG
975 TABLET ORAL EVERY 6 HOURS
Refills: 0 | Status: DISCONTINUED | OUTPATIENT
Start: 2023-10-03 | End: 2023-10-05

## 2023-10-03 RX ADMIN — Medication 975 MILLIGRAM(S): at 14:06

## 2023-10-03 RX ADMIN — Medication 1 PATCH: at 22:12

## 2023-10-03 RX ADMIN — Medication 81 MILLIGRAM(S): at 13:35

## 2023-10-03 RX ADMIN — OXYCODONE HYDROCHLORIDE 10 MILLIGRAM(S): 5 TABLET ORAL at 18:43

## 2023-10-03 RX ADMIN — Medication 975 MILLIGRAM(S): at 08:29

## 2023-10-03 RX ADMIN — Medication 1 PATCH: at 12:09

## 2023-10-03 RX ADMIN — Medication 975 MILLIGRAM(S): at 22:06

## 2023-10-03 RX ADMIN — ATORVASTATIN CALCIUM 20 MILLIGRAM(S): 80 TABLET, FILM COATED ORAL at 21:06

## 2023-10-03 RX ADMIN — Medication 1 APPLICATION(S): at 13:38

## 2023-10-03 RX ADMIN — HEPARIN SODIUM 5000 UNIT(S): 5000 INJECTION INTRAVENOUS; SUBCUTANEOUS at 06:58

## 2023-10-03 RX ADMIN — Medication 975 MILLIGRAM(S): at 02:26

## 2023-10-03 RX ADMIN — CLOPIDOGREL BISULFATE 75 MILLIGRAM(S): 75 TABLET, FILM COATED ORAL at 13:36

## 2023-10-03 RX ADMIN — HEPARIN SODIUM 5000 UNIT(S): 5000 INJECTION INTRAVENOUS; SUBCUTANEOUS at 21:06

## 2023-10-03 RX ADMIN — HEPARIN SODIUM 5000 UNIT(S): 5000 INJECTION INTRAVENOUS; SUBCUTANEOUS at 13:34

## 2023-10-03 RX ADMIN — Medication 975 MILLIGRAM(S): at 21:06

## 2023-10-03 RX ADMIN — LISINOPRIL 2.5 MILLIGRAM(S): 2.5 TABLET ORAL at 08:28

## 2023-10-03 RX ADMIN — Medication 1 PATCH: at 13:34

## 2023-10-03 RX ADMIN — Medication 975 MILLIGRAM(S): at 03:26

## 2023-10-03 RX ADMIN — Medication 975 MILLIGRAM(S): at 13:36

## 2023-10-03 RX ADMIN — OXYCODONE HYDROCHLORIDE 10 MILLIGRAM(S): 5 TABLET ORAL at 18:18

## 2023-10-03 NOTE — DISCHARGE NOTE PROVIDER - NSDCFUADDINST_GEN_ALL_CORE_FT
collagenase ointment to be applied to the right calf wound daily, followed by basic local wound coverage (tegaderm/bandaid)

## 2023-10-03 NOTE — PROGRESS NOTE ADULT - ATTENDING COMMENTS
Patient doing well postop has excellent DP signal.  Right groin is soft nontender.  She reports that her rest pain is essentially gone.  Ulcer is clean with some fibrinous material.  We will start collagenase to wound we will keep on antibiotics 1 more day given there was some erythema around the site although this appears to be improved.

## 2023-10-03 NOTE — DISCHARGE NOTE PROVIDER - NSDCCPCAREPLAN_GEN_ALL_CORE_FT
PRINCIPAL DISCHARGE DIAGNOSIS  Diagnosis: Leg pain, right  Assessment and Plan of Treatment: acute on chronic arterial insufficency

## 2023-10-03 NOTE — PROGRESS NOTE ADULT - ASSESSMENT
A-  Patient is a 64 y/o F POD 1 from a R CFEA, SFA angio,stent for CLI, Non healing wound and rest pain in the R LE. Patient is doing well, minimal pain and resting well post op.     P-  -Discharge planning  - Post-op checks  -Continue ACT  -Continue home meds A-  Patient is a 64 y/o F POD 1 from a R CFEA, SFA angio,stent for CLI, Non healing wound and rest pain in the R LE. Patient is doing well, minimal pain and resting well post op.     P-  -Discharge planning  - Neurovasc checks  -Continue DAPT and dvt ppx  -Pain control  -Local wound care with silvadine   -Continue home meds

## 2023-10-03 NOTE — DISCHARGE NOTE PROVIDER - CARE PROVIDER_API CALL
Shaheen Blair  Vascular Surgery  [address], [address]  [place] [address]  Phone: [phone]  Fax: [phone]  Follow Up Time: 2 weeks

## 2023-10-03 NOTE — CHART NOTE - NSCHARTNOTEFT_GEN_A_CORE
Cardiology consulted.  Patient has previously established care with [facility] group (Dr. Nolan). Vascular team made aware.   Will sign off.  Please reconsult if needed.
Post-op check:    Pt is a 62 yo F s/p Common femoral endarterectomy. femoral artery angioplasty, and femoral artery stent placement    Vitals:  Vital Signs Last 24 Hrs  T(C): 36.7 (03 Oct 2023 03:35), Max: 37.3 (02 Oct 2023 16:41)  T(F): 98.1 (03 Oct 2023 03:35), Max: 99.2 (02 Oct 2023 16:41)  HR: 73 (03 Oct 2023 03:35) (67 - 80)  BP: 148/83 (03 Oct 2023 03:35) (117/80 - 172/76)  BP(mean): 88 (03 Oct 2023 01:28) (76 - 107)  RR: 16 (03 Oct 2023 03:35) (13 - 20)  SpO2: 97% (03 Oct 2023 03:35) (91% - 98%)    Parameters below as of 03 Oct 2023 03:35  Patient On (Oxygen Delivery Method): nasal cannula  O2 Flow (L/min): 2    Patient resting in bed. Pt denies pain at this time. Denies nausea, vomiting, SOB, chest pain. Patient resting comfortably in bed    Physical exam:  General: alert, patient resting in bed comfortably, in NAD  Resp: equal chest rise bilaterally, nonlabored breathing  Cardio: RRR  Abdomen: soft, NT, ND  Extremities: moving all extremities spontaneously, R leg - positive signals of R AT and DP, Left DP monophasic signal    Plan:   -Continue HSQ q8  -Continue aspirin 81mg today 10/3  -Start clopidogrel 75mg today 10/3  -continue diet as tolerated  -pain control PRN  -encourage OOB, ambulation  -DVT ppx: SCDs

## 2023-10-03 NOTE — DISCHARGE NOTE PROVIDER - NSDCCPTREATMENT_GEN_ALL_CORE_FT
PRINCIPAL PROCEDURE  Procedure: Endarterectomy, common femoral  Findings and Treatment:       SECONDARY PROCEDURE  Procedure: Femoral artery stent placement  Findings and Treatment:

## 2023-10-03 NOTE — DISCHARGE NOTE PROVIDER - NSDCMRMEDTOKEN_GEN_ALL_CORE_FT
aspirin 81 mg oral tablet: 1 tab(s) orally once a day  collagenase 250 units/g topical ointment: Apply topically to affected area once a day  Lipitor 20 mg oral tablet: 1 tab(s) orally once a day  lisinopril 2.5 mg oral tablet: 1 tab(s) orally once a day  oxyCODONE 5 mg oral tablet: 1 tab(s) orally every 4 hours, As needed, Moderate Pain (4 - 6) MDD:6   aspirin 81 mg oral tablet: 1 tab(s) orally once a day  cephalexin 500 mg oral capsule: 1 cap(s) orally 4 times a day  clopidogrel 75 mg oral tablet: 1 tab(s) orally once a day  collagenase 250 units/g topical ointment: Apply topically to affected area once a day   mg oral tablet: 1 tab(s) orally every 6 hours  Lipitor 20 mg oral tablet: 1 tab(s) orally once a day  lisinopril 2.5 mg oral tablet: 1 tab(s) orally once a day  nicotine 14 mg/24 hr transdermal film, extended release: 14 mg/24h transdermal

## 2023-10-03 NOTE — DISCHARGE NOTE PROVIDER - HOSPITAL COURSE
Patient presented to the hospital 9/28/2023 for complaints of persistent, worsening pain to her right lower extremity and non healing right lateral calf wound.  It was determined that patient was experiencing acute on chronic arterial insufficiency which required surgical intervention.  After cardiology risk stratification was performed, on 10/2 reperfusion intervention was performed. Right common femoral endarterectomy and stenting of the Superficial femoral artery was completed with no reported intraoperative complications.  Post operatively the patient progressed well with no adverse acute changes in perfusion.  Currently patient is stable for discharge to home with outpatient follow up.  Patient is to continue antiplatelets and cease tobacco use.    Daily wound care to continue with collagenase ointment application to Right calf ulcer 2.5 cm in circumference, .5 cm deep Patient presented to the hospital 9/28/2023 for complaints of persistent, worsening pain to her right lower extremity and non healing right lateral calf wound.  It was determined that patient was experiencing acute on chronic arterial insufficiency which required surgical intervention.  After cardiology risk stratification was performed, on 10/2 reperfusion intervention was performed. Right common femoral endarterectomy and stenting of the Superficial femoral artery was completed with no reported intraoperative complications.  Post operatively the patient progressed well with no adverse acute changes in perfusion.  POD 2 the patient developed fever and hypertension.  Complete fever workup was negative, normotension achieved with fluid challenge  and cessation of opiates. Currently patient is stable for discharge to home with outpatient follow up.  Patient is to continue antiplatelets and cease tobacco use.    Daily wound care to continue with collagenase ointment application to Right calf ulcer 2.5 cm in circumference, .5 cm deep    Betadine swab around the periphery of the right groin wound and calf wound prior to apply covering adhesive dressing.  (Dressing supplies given to the patient)

## 2023-10-03 NOTE — PROGRESS NOTE ADULT - SUBJECTIVE AND OBJECTIVE BOX
S- 64 y/o F POD 1 from Common Femoral Endarterectomy,  SFA Angio and stent for hx of CLI, R LE with rest pain and non healing wound.  Pt was examined bedside, NAEON, doing well.    MEDICATIONS  (STANDING):  acetaminophen     Tablet .. 975 milliGRAM(s) Oral every 6 hours  aspirin  chewable 81 milliGRAM(s) Oral daily  atorvastatin 20 milliGRAM(s) Oral at bedtime  clopidogrel Tablet 75 milliGRAM(s) Oral daily  collagenase Ointment 1 Application(s) Topical daily  heparin   Injectable 5000 Unit(s) SubCutaneous every 8 hours  lisinopril 2.5 milliGRAM(s) Oral daily  nicotine -  14 mG/24Hr(s) Patch 1 Patch Transdermal daily  senna 2 Tablet(s) Oral at bedtime  silver sulfADIAZINE 1% Cream 1 Application(s) Topical daily    MEDICATIONS  (PRN):  aluminum hydroxide/magnesium hydroxide/simethicone Suspension 30 milliLiter(s) Oral every 4 hours PRN Dyspepsia  HYDROmorphone  Injectable 1 milliGRAM(s) IV Push every 4 hours PRN Severe Pain (7 - 10)  oxyCODONE    IR 5 milliGRAM(s) Oral every 4 hours PRN Moderate Pain (4 - 6)  oxyCODONE    IR 10 milliGRAM(s) Oral every 4 hours PRN Severe Pain (7 - 10)                          11.9   8.07  )-----------( 178      ( 03 Oct 2023 00:05 )             36.0   10-03    137  |  102  |  12.1  ----------------------------<  157<H>  4.0   |  22.0  |  0.69    Ca    8.6      03 Oct 2023 00:05  Phos  3.6     10-02  Mg     1.7     10-02    O- Vital Signs Last 24 Hrs  T(C): 36.9 (03 Oct 2023 04:44), Max: 37.3 (02 Oct 2023 16:41)  T(F): 98.5 (03 Oct 2023 04:44), Max: 99.2 (02 Oct 2023 16:41)  HR: 73 (03 Oct 2023 04:44) (67 - 80)  BP: 145/79 (03 Oct 2023 04:44) (117/80 - 172/76)  BP(mean): 88 (03 Oct 2023 01:28) (76 - 107)  RR: 17 (03 Oct 2023 04:44) (13 - 20)  SpO2: 93% (03 Oct 2023 04:44) (91% - 98%)    Parameters below as of 03 Oct 2023 03:35  Patient On (Oxygen Delivery Method): nasal cannula  O2 Flow (L/min): 2    Exam-  Gen: pt resting in bed, is A&O x3, NAD, minimal pain  Resp: non labored, normal rate  Cardiac: RRR  Abd- Soft, NT ND  Neuro- no deficits  Ext- Moving all extremities spontaneously, sensation intact. R DP monophasic signals, R AT biphasic  S- 64 y/o F POD 1 from Common Femoral Endarterectomy,  SFA stent for hx of CLI, R LE with rest pain and non healing wound.  Pt was examined bedside, NAEON, doing well.    MEDICATIONS  (STANDING):  acetaminophen     Tablet .. 975 milliGRAM(s) Oral every 6 hours  aspirin  chewable 81 milliGRAM(s) Oral daily  atorvastatin 20 milliGRAM(s) Oral at bedtime  clopidogrel Tablet 75 milliGRAM(s) Oral daily  collagenase Ointment 1 Application(s) Topical daily  heparin   Injectable 5000 Unit(s) SubCutaneous every 8 hours  lisinopril 2.5 milliGRAM(s) Oral daily  nicotine -  14 mG/24Hr(s) Patch 1 Patch Transdermal daily  senna 2 Tablet(s) Oral at bedtime  silver sulfADIAZINE 1% Cream 1 Application(s) Topical daily    MEDICATIONS  (PRN):  aluminum hydroxide/magnesium hydroxide/simethicone Suspension 30 milliLiter(s) Oral every 4 hours PRN Dyspepsia  HYDROmorphone  Injectable 1 milliGRAM(s) IV Push every 4 hours PRN Severe Pain (7 - 10)  oxyCODONE    IR 5 milliGRAM(s) Oral every 4 hours PRN Moderate Pain (4 - 6)  oxyCODONE    IR 10 milliGRAM(s) Oral every 4 hours PRN Severe Pain (7 - 10)                          11.9   8.07  )-----------( 178      ( 03 Oct 2023 00:05 )             36.0   10-03    137  |  102  |  12.1  ----------------------------<  157<H>  4.0   |  22.0  |  0.69    Ca    8.6      03 Oct 2023 00:05  Phos  3.6     10-02  Mg     1.7     10-02    O- Vital Signs Last 24 Hrs  T(C): 36.9 (03 Oct 2023 04:44), Max: 37.3 (02 Oct 2023 16:41)  T(F): 98.5 (03 Oct 2023 04:44), Max: 99.2 (02 Oct 2023 16:41)  HR: 73 (03 Oct 2023 04:44) (67 - 80)  BP: 145/79 (03 Oct 2023 04:44) (117/80 - 172/76)  BP(mean): 88 (03 Oct 2023 01:28) (76 - 107)  RR: 17 (03 Oct 2023 04:44) (13 - 20)  SpO2: 93% (03 Oct 2023 04:44) (91% - 98%)    Parameters below as of 03 Oct 2023 03:35  Patient On (Oxygen Delivery Method): nasal cannula  O2 Flow (L/min): 2    Exam-  Gen: pt resting in bed, is A&O x3, NAD, minimal pain  Resp: non labored, normal rate  Cardiac: RRR  Abd- Soft, NT ND  Neuro- no deficits  Ext- Moving all extremities spontaneously, sensation intact. R DP monophasic signals, R AT biphasic

## 2023-10-04 LAB
ANION GAP SERPL CALC-SCNC: 9 MMOL/L — SIGNIFICANT CHANGE UP (ref 5–17)
APPEARANCE UR: CLEAR — SIGNIFICANT CHANGE UP
BACTERIA # UR AUTO: ABNORMAL
BASOPHILS # BLD AUTO: 0.01 K/UL — SIGNIFICANT CHANGE UP (ref 0–0.2)
BASOPHILS # BLD AUTO: 0.01 K/UL — SIGNIFICANT CHANGE UP (ref 0–0.2)
BASOPHILS NFR BLD AUTO: 0.1 % — SIGNIFICANT CHANGE UP (ref 0–2)
BASOPHILS NFR BLD AUTO: 0.1 % — SIGNIFICANT CHANGE UP (ref 0–2)
BILIRUB UR-MCNC: NEGATIVE — SIGNIFICANT CHANGE UP
BUN SERPL-MCNC: 19.2 MG/DL — SIGNIFICANT CHANGE UP (ref 8–20)
CALCIUM SERPL-MCNC: 8.4 MG/DL — SIGNIFICANT CHANGE UP (ref 8.4–10.5)
CHLORIDE SERPL-SCNC: 104 MMOL/L — SIGNIFICANT CHANGE UP (ref 96–108)
CO2 SERPL-SCNC: 23 MMOL/L — SIGNIFICANT CHANGE UP (ref 22–29)
COLOR SPEC: YELLOW — SIGNIFICANT CHANGE UP
CREAT SERPL-MCNC: 1.15 MG/DL — SIGNIFICANT CHANGE UP (ref 0.5–1.3)
DIFF PNL FLD: ABNORMAL
EGFR: 54 ML/MIN/1.73M2 — LOW
EOSINOPHIL # BLD AUTO: 0.59 K/UL — HIGH (ref 0–0.5)
EOSINOPHIL # BLD AUTO: 0.75 K/UL — HIGH (ref 0–0.5)
EOSINOPHIL NFR BLD AUTO: 5.7 % — SIGNIFICANT CHANGE UP (ref 0–6)
EOSINOPHIL NFR BLD AUTO: 7 % — HIGH (ref 0–6)
EPI CELLS # UR: SIGNIFICANT CHANGE UP
GLUCOSE SERPL-MCNC: 114 MG/DL — HIGH (ref 70–99)
GLUCOSE UR QL: NEGATIVE MG/DL — SIGNIFICANT CHANGE UP
HCT VFR BLD CALC: 32.3 % — LOW (ref 34.5–45)
HCT VFR BLD CALC: 34.2 % — LOW (ref 34.5–45)
HGB BLD-MCNC: 10.5 G/DL — LOW (ref 11.5–15.5)
HGB BLD-MCNC: 11.1 G/DL — LOW (ref 11.5–15.5)
IMM GRANULOCYTES NFR BLD AUTO: 0.6 % — SIGNIFICANT CHANGE UP (ref 0–0.9)
IMM GRANULOCYTES NFR BLD AUTO: 0.6 % — SIGNIFICANT CHANGE UP (ref 0–0.9)
KETONES UR-MCNC: NEGATIVE — SIGNIFICANT CHANGE UP
LEUKOCYTE ESTERASE UR-ACNC: ABNORMAL
LYMPHOCYTES # BLD AUTO: 0.61 K/UL — LOW (ref 1–3.3)
LYMPHOCYTES # BLD AUTO: 0.66 K/UL — LOW (ref 1–3.3)
LYMPHOCYTES # BLD AUTO: 5.9 % — LOW (ref 13–44)
LYMPHOCYTES # BLD AUTO: 6.2 % — LOW (ref 13–44)
MAGNESIUM SERPL-MCNC: 1.5 MG/DL — LOW (ref 1.8–2.6)
MCHC RBC-ENTMCNC: 29.2 PG — SIGNIFICANT CHANGE UP (ref 27–34)
MCHC RBC-ENTMCNC: 29.4 PG — SIGNIFICANT CHANGE UP (ref 27–34)
MCHC RBC-ENTMCNC: 32.5 GM/DL — SIGNIFICANT CHANGE UP (ref 32–36)
MCHC RBC-ENTMCNC: 32.5 GM/DL — SIGNIFICANT CHANGE UP (ref 32–36)
MCV RBC AUTO: 90 FL — SIGNIFICANT CHANGE UP (ref 80–100)
MCV RBC AUTO: 90.5 FL — SIGNIFICANT CHANGE UP (ref 80–100)
MONOCYTES # BLD AUTO: 0.36 K/UL — SIGNIFICANT CHANGE UP (ref 0–0.9)
MONOCYTES # BLD AUTO: 0.61 K/UL — SIGNIFICANT CHANGE UP (ref 0–0.9)
MONOCYTES NFR BLD AUTO: 3.5 % — SIGNIFICANT CHANGE UP (ref 2–14)
MONOCYTES NFR BLD AUTO: 5.7 % — SIGNIFICANT CHANGE UP (ref 2–14)
NEUTROPHILS # BLD AUTO: 8.64 K/UL — HIGH (ref 1.8–7.4)
NEUTROPHILS # BLD AUTO: 8.68 K/UL — HIGH (ref 1.8–7.4)
NEUTROPHILS NFR BLD AUTO: 80.4 % — HIGH (ref 43–77)
NEUTROPHILS NFR BLD AUTO: 84.2 % — HIGH (ref 43–77)
NITRITE UR-MCNC: NEGATIVE — SIGNIFICANT CHANGE UP
PH UR: 6 — SIGNIFICANT CHANGE UP (ref 5–8)
PHOSPHATE SERPL-MCNC: 2.7 MG/DL — SIGNIFICANT CHANGE UP (ref 2.4–4.7)
PLATELET # BLD AUTO: 150 K/UL — SIGNIFICANT CHANGE UP (ref 150–400)
PLATELET # BLD AUTO: 157 K/UL — SIGNIFICANT CHANGE UP (ref 150–400)
POTASSIUM SERPL-MCNC: 4.1 MMOL/L — SIGNIFICANT CHANGE UP (ref 3.5–5.3)
POTASSIUM SERPL-SCNC: 4.1 MMOL/L — SIGNIFICANT CHANGE UP (ref 3.5–5.3)
PROT UR-MCNC: 30 MG/DL
RBC # BLD: 3.57 M/UL — LOW (ref 3.8–5.2)
RBC # BLD: 3.8 M/UL — SIGNIFICANT CHANGE UP (ref 3.8–5.2)
RBC # FLD: 13.2 % — SIGNIFICANT CHANGE UP (ref 10.3–14.5)
RBC # FLD: 13.4 % — SIGNIFICANT CHANGE UP (ref 10.3–14.5)
RBC CASTS # UR COMP ASSIST: ABNORMAL /HPF (ref 0–4)
SARS-COV-2 RNA SPEC QL NAA+PROBE: SIGNIFICANT CHANGE UP
SODIUM SERPL-SCNC: 136 MMOL/L — SIGNIFICANT CHANGE UP (ref 135–145)
SP GR SPEC: 1.01 — SIGNIFICANT CHANGE UP (ref 1.01–1.02)
UROBILINOGEN FLD QL: NEGATIVE MG/DL — SIGNIFICANT CHANGE UP
WBC # BLD: 10.31 K/UL — SIGNIFICANT CHANGE UP (ref 3.8–10.5)
WBC # BLD: 10.73 K/UL — HIGH (ref 3.8–10.5)
WBC # FLD AUTO: 10.31 K/UL — SIGNIFICANT CHANGE UP (ref 3.8–10.5)
WBC # FLD AUTO: 10.73 K/UL — HIGH (ref 3.8–10.5)
WBC UR QL: SIGNIFICANT CHANGE UP /HPF (ref 0–5)

## 2023-10-04 PROCEDURE — 71045 X-RAY EXAM CHEST 1 VIEW: CPT | Mod: 26

## 2023-10-04 PROCEDURE — 93970 EXTREMITY STUDY: CPT | Mod: 26

## 2023-10-04 RX ORDER — PIPERACILLIN AND TAZOBACTAM 4; .5 G/20ML; G/20ML
3.38 INJECTION, POWDER, LYOPHILIZED, FOR SOLUTION INTRAVENOUS EVERY 8 HOURS
Refills: 0 | Status: DISCONTINUED | OUTPATIENT
Start: 2023-10-04 | End: 2023-10-05

## 2023-10-04 RX ORDER — MAGNESIUM SULFATE 500 MG/ML
2 VIAL (ML) INJECTION ONCE
Refills: 0 | Status: COMPLETED | OUTPATIENT
Start: 2023-10-04 | End: 2023-10-04

## 2023-10-04 RX ORDER — PIPERACILLIN AND TAZOBACTAM 4; .5 G/20ML; G/20ML
3.38 INJECTION, POWDER, LYOPHILIZED, FOR SOLUTION INTRAVENOUS ONCE
Refills: 0 | Status: COMPLETED | OUTPATIENT
Start: 2023-10-04 | End: 2023-10-04

## 2023-10-04 RX ADMIN — ATORVASTATIN CALCIUM 20 MILLIGRAM(S): 80 TABLET, FILM COATED ORAL at 23:02

## 2023-10-04 RX ADMIN — PIPERACILLIN AND TAZOBACTAM 25 GRAM(S): 4; .5 INJECTION, POWDER, LYOPHILIZED, FOR SOLUTION INTRAVENOUS at 17:10

## 2023-10-04 RX ADMIN — Medication 85 MILLIMOLE(S): at 12:25

## 2023-10-04 RX ADMIN — Medication 81 MILLIGRAM(S): at 12:25

## 2023-10-04 RX ADMIN — Medication 975 MILLIGRAM(S): at 23:06

## 2023-10-04 RX ADMIN — Medication 975 MILLIGRAM(S): at 06:01

## 2023-10-04 RX ADMIN — CLOPIDOGREL BISULFATE 75 MILLIGRAM(S): 75 TABLET, FILM COATED ORAL at 12:25

## 2023-10-04 RX ADMIN — Medication 975 MILLIGRAM(S): at 07:01

## 2023-10-04 RX ADMIN — HEPARIN SODIUM 5000 UNIT(S): 5000 INJECTION INTRAVENOUS; SUBCUTANEOUS at 13:28

## 2023-10-04 RX ADMIN — SENNA PLUS 2 TABLET(S): 8.6 TABLET ORAL at 23:03

## 2023-10-04 RX ADMIN — Medication 15 MILLIGRAM(S): at 01:03

## 2023-10-04 RX ADMIN — PIPERACILLIN AND TAZOBACTAM 200 GRAM(S): 4; .5 INJECTION, POWDER, LYOPHILIZED, FOR SOLUTION INTRAVENOUS at 06:01

## 2023-10-04 RX ADMIN — Medication 975 MILLIGRAM(S): at 17:37

## 2023-10-04 RX ADMIN — Medication 15 MILLIGRAM(S): at 00:03

## 2023-10-04 RX ADMIN — Medication 1 PATCH: at 19:00

## 2023-10-04 RX ADMIN — Medication 1 APPLICATION(S): at 12:38

## 2023-10-04 RX ADMIN — PIPERACILLIN AND TAZOBACTAM 25 GRAM(S): 4; .5 INJECTION, POWDER, LYOPHILIZED, FOR SOLUTION INTRAVENOUS at 08:18

## 2023-10-04 RX ADMIN — HEPARIN SODIUM 5000 UNIT(S): 5000 INJECTION INTRAVENOUS; SUBCUTANEOUS at 23:02

## 2023-10-04 RX ADMIN — Medication 25 GRAM(S): at 08:18

## 2023-10-04 RX ADMIN — Medication 1 PATCH: at 13:34

## 2023-10-04 RX ADMIN — Medication 975 MILLIGRAM(S): at 12:25

## 2023-10-04 RX ADMIN — Medication 1 PATCH: at 12:25

## 2023-10-04 RX ADMIN — Medication 975 MILLIGRAM(S): at 12:55

## 2023-10-04 RX ADMIN — PIPERACILLIN AND TAZOBACTAM 25 GRAM(S): 4; .5 INJECTION, POWDER, LYOPHILIZED, FOR SOLUTION INTRAVENOUS at 23:02

## 2023-10-04 RX ADMIN — SODIUM CHLORIDE 1000 MILLILITER(S): 9 INJECTION INTRAMUSCULAR; INTRAVENOUS; SUBCUTANEOUS at 00:03

## 2023-10-04 RX ADMIN — Medication 1 PATCH: at 07:47

## 2023-10-04 RX ADMIN — Medication 975 MILLIGRAM(S): at 17:09

## 2023-10-04 NOTE — PROGRESS NOTE ADULT - ATTENDING COMMENTS
pt doing well this am  apparently was hypotensive last night.  Normotensive this morning.  Was febrile last night on exam right groin is soft there is no appreciable hematoma hemoglobin was 10.5 we will recheck this afternoon to make sure that it is stable.  She does have some blanching erythema around the wound antibiotics were escalated given prosthetic patch.  UA is equivocal will await culture.  DVT study negative.  Reports feeling fine with no pain in the foot and mild pain in the groin.  We will obviously keep today recheck labs and potentially discharge tomorrow on p.o. antibiotics for the right lower extremity wound.

## 2023-10-04 NOTE — PHYSICAL THERAPY INITIAL EVALUATION ADULT - LEVEL OF CONSCIOUSNESS, REHAB EVAL
After Visit Summary   2/19/2018    Joce Mccoy    MRN: [id number]           Patient Information     Date Of Birth          1984        Visit Information        Provider Department      2/19/2018 1:30 PM Pharmacist, Nicholas Thomas [facility] Preoperative [facility] [place]        Today's Diagnoses     Preop examination    -  1       Follow-ups after your visit        Your next 10 appointments already scheduled     Feb 19, 2018  3:00 PM CST   (Arrive by 2:45 PM)   PAC RN ASSESSMENT with Nicholas Pac Rn   [facility] Preoperative [facility] [place] ([facility])    [address] [place] [address]  [address] [facility] [address]   [phone]            Feb 19, 2018  3:20 PM CST   (Arrive by 3:05 PM)   PAC Anesthesia Consult with Nicholas Pac Anesthesiologist   [facility] Preoperative [facility] [place] ([facility])    [address] [place] [id number]   [phone]            Feb 19, 2018  3:30 PM CST   LAB with NICHOLAS LAB   [facility] Lab ([facility])    [address] [place] [id number]   [phone]           Please do not eat 10-12 hours before your appointment if you are coming in fasting for labs on lipids, cholesterol, or glucose (sugar). This does not apply to pregnant women. Water, hot tea and black coffee (with nothing added) are okay. Do not drink other fluids, diet soda or chew gum.            Feb 23, 2018   Procedure with Paul Carlson MD   [facility] ([facility])    [address] [place] [address]  [address] [facility] [id number] [phone]           Located in the Clinics and Surgery Center at [address] [place] [address].   parking is very convenient and highly recommended.  is a $6 flat rate fee.  Both  and self parkers should enter the main arrival plaza from [facility]; parking attendants will direct you  based on your parking preference.            2018  1:00 PM CDT   (Arrive by 12:45 PM)   Post-Op with Angelic Pizarro PA-C   [facility] ([facility])    [address] [address] [phone]              Who to contact     Please call your clinic at [phone] to:    Ask questions about your health    Make or cancel appointments    Discuss your medicines    Learn about your test results    Speak to your doctor            Additional Information About Your Visit        [employer] Information     [employer] is an electronic gateway that provides easy, online access to your medical records. With [employer], you can request a clinic appointment, read your test results, renew a prescription or communicate with your care team.     To sign up for [employer] visit the website at [email or web address]   You will be asked to enter the access code listed below, as well as some personal information. Please follow the directions to create your username and password.     Your access code is: [email or web address]  Expires: 2018  6:31 AM     Your access code will  in 90 days. If you need help or a new code, please contact your [facility] or call [phone] for assistance.        Care EveryWhere ID     This is your Care EveryWhere ID. This could be used by other organizations to access your [facility] medical records  [id number]         Blood Pressure from Last 3 Encounters:   18 128/86   17 100/59   17 91/60    Weight from Last 3 Encounters:   18 100.7 kg (222 lb)   17 106.6 kg (235 lb)   17 108.9 kg (240 lb)              Today, you had the following     No orders found for display         Today's Medication Changes          These changes are accurate as of 18  2:46 PM.  If you have any questions, ask your nurse or doctor.               These  medicines have changed or have updated prescriptions.        Dose/Directions    docusate sodium 283 MG enema   Commonly known as:  ENEMEEZ   This may have changed:  Another medication with the same name was removed. Continue taking this medication, and follow the directions you see here.   Changed by:  Nicholas Schwarz        Dose:  1 enema   Place 1 enema rectally daily as needed for constipation   Refills:  0         Stop taking these medicines if you haven't already. Please contact your care team if you have questions.     acetylcysteine 20 % nebulizer solution   Commonly known as:  MUCOMYST   Stopped by:  Pharmacist, Uc Pac           albuterol (2.5 MG/3ML) 0.083% neb solution   Stopped by:  Pharmacist, Uc Pac           artificial saliva Soln solution   Stopped by:  Pharmacist, Uc Pac           bisacodyl 10 MG Suppository   Commonly known as:  DULCOLAX   Stopped by:  Pharmacist, Uc Pac           carboxymethylcellulose 0.5 % Soln ophthalmic solution   Commonly known as:  REFRESH PLUS   Stopped by:  Nicholas Schwarz           Cranberry 500 MG Caps   Stopped by:  Pharmacist, Uc Pac           CULTURELLE PO   Stopped by:  Pharmacist, Uc Pac           DiphenhydrAMINE HCl (Sleep) 50 MG Tabs   Stopped by:  Pharmacist, Uc Pac           enoxaparin 40 MG/0.4ML injection   Commonly known as:  LOVENOX   Stopped by:  Nicholas Schwarz           ERGOCALCIFEROL PO   Stopped by:  Pharmacist, Uc Pac           eucerin cream   Stopped by:  Nicholas Schwarz           GUAR GUM PO   Stopped by:  Nicholas Schwarz           MELATONIN PO   Stopped by:  Pharmacist, Uc Pac           ondansetron 8 MG tablet   Commonly known as:  ZOFRAN   Stopped by:  Pharmacist, Uc Pac           oxyCODONE IR 5 MG tablet   Commonly known as:  ROXICODONE   Stopped by:  Pharmacist, Uc Pac           PARoxetine 30 MG tablet   Commonly known as:  PAXIL   Stopped by:  Pharmacist, Uc Pac           PEPCID PO   Stopped by:  Nicholas Schwarz            phenol-menthol 14.5 MG lozenge   Stopped by:  Pharmacist, Uc Pac           polyethylene glycol Packet   Commonly known as:  MIRALAX/GLYCOLAX   Stopped by:  Pharmacist, Uc Pac           polyethylene glycol powder   Commonly known as:  MIRALAX/GLYCOLAX   Stopped by:  Pharmacist, Uc Pac           PROSOURCE TF Liqd   Stopped by:  Pharmacist, Uc Pac           ranitidine 75 MG/5ML syrup   Commonly known as:  ZANTAC   Stopped by:  Pharmacist, Uc Pac           senna 8.6 MG tablet   Commonly known as:  SENOKOT   Stopped by:  Pharmacist, Uc Pac           senna-docusate 8.6-50 MG per tablet   Commonly known as:  SENOKOT-S;PERICOLACE   Stopped by:  Pharmacist, Uc Pac           simethicone 125 MG Chew chewable tablet   Commonly known as:  MYLICON   Stopped by:  Pharmacist, Uc Pac           TYLENOL PO   Stopped by:  Pharmacist, Uc Pac           ULTRAM PO   Stopped by:  Nicholas Schwarz           Water Oral Liqd   Stopped by:  Nicholas Schwarz                    Primary Care Provider Fax #    Provider Not In System [phone]                Equal Access to Services     [facility]: Hadii noah ku hadasho Soomaali, waaxda luqadaha, qaybta kaalmada adeegyada, waxay pauline young . So [facility] [phone].    ATENCIÓN: Si habla español, tiene a soliz disposición servicios gratuitos de asistencia lingüística. Llame al [phone].    We comply with applicable federal civil rights laws and [place] laws. We do not discriminate on the basis of race, color, national origin, age, disability, sex, sexual orientation, or gender identity.            Thank you!     Thank you for choosing [facility]  for your care. Our goal is always to provide you with excellent care. Hearing back from our patients is one way we can continue to improve our services. Please take a few minutes to complete the written survey that you may receive in the mail after your visit with us. Thank you!             Your  Updated Medication List - Protect others around you: Learn how to safely use, store and throw away your medicines at www.disposemymeds.org.          This list is accurate as of 2/19/18  2:46 PM.  Always use your most recent med list.                   Brand Name Dispense Instructions for use Diagnosis    BACLOFEN PO      Take 20 mg by mouth 4 times daily        CYMBALTA PO      Take 90 mg by mouth daily        docusate sodium 283 MG enema    ENEMEEZ     Place 1 enema rectally daily as needed for constipation        HYDROmorphone 4 MG tablet    DILAUDID     Take 4 mg by mouth every 6 hours as needed for pain Does not use daily        IBUPROFEN PO      Take 400 mg by mouth every 6 hours as needed        LORazepam 0.5 MG tablet    ATIVAN     Take 0.5 mg by mouth        LYRICA PO      Take 300 mg by mouth 2 times daily        MIDODRINE HCL PO      Take 5 mg by mouth 2 times daily        NORTRIPTYLINE HCL PO      Take 25 mg by mouth At Bedtime        oxybutynin 10 MG 24 hr tablet    DITROPAN XL    180 tablet    Take 2 tablets (20 mg) by mouth daily    Neurogenic bladder, Cervical spinal cord injury, sequela (H)       Sodium Chloride 0.65 % Soln      Instill 2 sprays into each notrils every hour as needed.        TRAZODONE HCL PO      Take 25 mg by mouth nightly as needed        vancomycin 50 mg/mL Liqd solution    VANOCIN     Take 125 mg by mouth        VITAMIN C PO      Take 500 mg by mouth daily as needed           alert

## 2023-10-04 NOTE — PROGRESS NOTE ADULT - ASSESSMENT
A: Patient is a 62 y/o F presented with a RLE non healing wound, and RLE rest pain. POD 2 from R FEA angioplasty and stent placement. Her rest pain has since resolved. Overnight pt had a 102F temp which is now resolved and hypotension overnight. Urine, blood Cultures, covid swab ordered, chest xray ordered.      P:   -F/U Xray and cultures  -Hold lisinopril, opiates  - Continue DAPT and dvt ppx  -Local wound care with silvadine  -Continue home meds- not lisinopril  - Pain control  - Complete Fever w/u  - OOB today A: Patient is a 62 y/o F presented with a RLE non healing wound, and RLE rest pain. POD 2 from R Common femoral Artery endarterectomy, SFA  angioplasty and stent placement. Her rest pain has since resolved. Overnight pt had a 102F temp which is now resolved and hypotension overnight. Urine, blood Cultures, covid swab ordered, chest xray ordered.      P:   -F/U Xray and cultures  -Hold lisinopril, opiates  - Continue DAPT and dvt ppx (hold am dose)  -Local wound care with collagenase   -Continue home meds, hold lisinopril  - Pain control with Tylenol only   - Complete Fever w/u  - OOB today

## 2023-10-04 NOTE — PROGRESS NOTE ADULT - SUBJECTIVE AND OBJECTIVE BOX
S: 63/yo F POD 1 s/p R Femoral Endarterectomy, R F angioplasty and stent placement for CLI, RLE non healing wound and rest pain.   Overnight pt had 1 temperature of 102 F and recurrent hypotension. Rest pain resolved, RLE wound healing well.    MEDICATIONS  (STANDING):  acetaminophen     Tablet .. 975 milliGRAM(s) Oral every 6 hours  aspirin  chewable 81 milliGRAM(s) Oral daily  atorvastatin 20 milliGRAM(s) Oral at bedtime  clopidogrel Tablet 75 milliGRAM(s) Oral daily  collagenase Ointment 1 Application(s) Topical daily  heparin   Injectable 5000 Unit(s) SubCutaneous every 8 hours  lisinopril 2.5 milliGRAM(s) Oral daily  nicotine -  14 mG/24Hr(s) Patch 1 Patch Transdermal daily  piperacillin/tazobactam IVPB.- 3.375 Gram(s) IV Intermittent once  piperacillin/tazobactam IVPB.- 3.375 Gram(s) IV Intermittent once  piperacillin/tazobactam IVPB.. 3.375 Gram(s) IV Intermittent every 8 hours  senna 2 Tablet(s) Oral at bedtime    MEDICATIONS  (PRN):  aluminum hydroxide/magnesium hydroxide/simethicone Suspension 30 milliLiter(s) Oral every 4 hours PRN Dyspepsia                          10.5   10.31 )-----------( 150      ( 04 Oct 2023 05:11 )             32.3   10-04    136  |  104  |  19.2  ----------------------------<  114<H>  4.1   |  23.0  |  1.15    Ca    8.4      04 Oct 2023 05:11  Phos  2.7     10-04  Mg     1.5     10-04        O- Vital Signs Last 24 Hrs  T(C): 36.9 (04 Oct 2023 04:55), Max: 39 (03 Oct 2023 20:21)  T(F): 98.4 (04 Oct 2023 04:55), Max: 102.2 (03 Oct 2023 20:21)  HR: 83 (04 Oct 2023 04:55) (75 - 95)  BP: 86/54 (04 Oct 2023 04:55) (80/45 - 148/69)  BP(mean): --  RR: 18 (04 Oct 2023 04:55) (15 - 18)  SpO2: 91% (04 Oct 2023 04:55) (90% - 96%)    Parameters below as of 04 Oct 2023 04:55  Patient On (Oxygen Delivery Method): room air    Gen: Pt sitting in bed comfortably, NAD  Cardiac: RRR  Pulm: Non labored breathing, RR  MSK: moving all extremities well, sensation intact. RLE wound healing well, R groin op site healing well. Biphasic DP pulses B/FL     S: 63/yo F POD 1 s/p R Common Femoral Endarterectomy, R SFA angioplasty and stent placement for CLI, RLE non healing wound and rest pain.   Overnight pt had 1 temperature of 102 F and recurrent hypotension. Rest pain resolved.  IV bolus given for hypotension, but had to be discontinued due to patient having SOB      MEDICATIONS  (STANDING):  acetaminophen     Tablet .. 975 milliGRAM(s) Oral every 6 hours  aspirin  chewable 81 milliGRAM(s) Oral daily  atorvastatin 20 milliGRAM(s) Oral at bedtime  clopidogrel Tablet 75 milliGRAM(s) Oral daily  collagenase Ointment 1 Application(s) Topical daily  heparin   Injectable 5000 Unit(s) SubCutaneous every 8 hours  lisinopril 2.5 milliGRAM(s) Oral daily  nicotine -  14 mG/24Hr(s) Patch 1 Patch Transdermal daily  piperacillin/tazobactam IVPB.- 3.375 Gram(s) IV Intermittent once  piperacillin/tazobactam IVPB.- 3.375 Gram(s) IV Intermittent once  piperacillin/tazobactam IVPB.. 3.375 Gram(s) IV Intermittent every 8 hours  senna 2 Tablet(s) Oral at bedtime    MEDICATIONS  (PRN):  aluminum hydroxide/magnesium hydroxide/simethicone Suspension 30 milliLiter(s) Oral every 4 hours PRN Dyspepsia                          10.5   10.31 )-----------( 150      ( 04 Oct 2023 05:11 )             32.3   10-04    136  |  104  |  19.2  ----------------------------<  114<H>  4.1   |  23.0  |  1.15    Ca    8.4      04 Oct 2023 05:11  Phos  2.7     10-04  Mg     1.5     10-04        O- Vital Signs Last 24 Hrs  T(C): 36.9 (04 Oct 2023 04:55), Max: 39 (03 Oct 2023 20:21)  T(F): 98.4 (04 Oct 2023 04:55), Max: 102.2 (03 Oct 2023 20:21)  HR: 83 (04 Oct 2023 04:55) (75 - 95)  BP: 86/54 (04 Oct 2023 04:55) (80/45 - 148/69)  BP(mean): --  RR: 18 (04 Oct 2023 04:55) (15 - 18)  SpO2: 91% (04 Oct 2023 04:55) (90% - 96%)    Parameters below as of 04 Oct 2023 04:55  Patient On (Oxygen Delivery Method): room air    Gen: Pt sitting in bed comfortably, NAD  Cardiac: RRR  Pulm: Non labored breathing, RR  MSK: moving all extremities well, sensation intact. RLE wound healing well, R groin op site healing well. Biphasic DP pulses B/FL

## 2023-10-05 ENCOUNTER — TRANSCRIPTION ENCOUNTER (OUTPATIENT)
Age: 63
End: 2023-10-05

## 2023-10-05 VITALS
TEMPERATURE: 98 F | DIASTOLIC BLOOD PRESSURE: 74 MMHG | RESPIRATION RATE: 18 BRPM | HEART RATE: 90 BPM | OXYGEN SATURATION: 98 % | SYSTOLIC BLOOD PRESSURE: 144 MMHG

## 2023-10-05 LAB
ANION GAP SERPL CALC-SCNC: 11 MMOL/L — SIGNIFICANT CHANGE UP (ref 5–17)
BASOPHILS # BLD AUTO: 0.01 K/UL — SIGNIFICANT CHANGE UP (ref 0–0.2)
BASOPHILS NFR BLD AUTO: 0.1 % — SIGNIFICANT CHANGE UP (ref 0–2)
BUN SERPL-MCNC: 11.8 MG/DL — SIGNIFICANT CHANGE UP (ref 8–20)
CALCIUM SERPL-MCNC: 8 MG/DL — LOW (ref 8.4–10.5)
CHLORIDE SERPL-SCNC: 105 MMOL/L — SIGNIFICANT CHANGE UP (ref 96–108)
CO2 SERPL-SCNC: 23 MMOL/L — SIGNIFICANT CHANGE UP (ref 22–29)
CREAT SERPL-MCNC: 0.66 MG/DL — SIGNIFICANT CHANGE UP (ref 0.5–1.3)
EGFR: 99 ML/MIN/1.73M2 — SIGNIFICANT CHANGE UP
EOSINOPHIL # BLD AUTO: 0.69 K/UL — HIGH (ref 0–0.5)
EOSINOPHIL NFR BLD AUTO: 9.4 % — HIGH (ref 0–6)
GLUCOSE SERPL-MCNC: 102 MG/DL — HIGH (ref 70–99)
HCT VFR BLD CALC: 32.4 % — LOW (ref 34.5–45)
HGB BLD-MCNC: 10.6 G/DL — LOW (ref 11.5–15.5)
IMM GRANULOCYTES NFR BLD AUTO: 0.3 % — SIGNIFICANT CHANGE UP (ref 0–0.9)
LYMPHOCYTES # BLD AUTO: 0.49 K/UL — LOW (ref 1–3.3)
LYMPHOCYTES # BLD AUTO: 6.7 % — LOW (ref 13–44)
MAGNESIUM SERPL-MCNC: 1.7 MG/DL — SIGNIFICANT CHANGE UP (ref 1.6–2.6)
MCHC RBC-ENTMCNC: 29.2 PG — SIGNIFICANT CHANGE UP (ref 27–34)
MCHC RBC-ENTMCNC: 32.7 GM/DL — SIGNIFICANT CHANGE UP (ref 32–36)
MCV RBC AUTO: 89.3 FL — SIGNIFICANT CHANGE UP (ref 80–100)
MONOCYTES # BLD AUTO: 0.31 K/UL — SIGNIFICANT CHANGE UP (ref 0–0.9)
MONOCYTES NFR BLD AUTO: 4.2 % — SIGNIFICANT CHANGE UP (ref 2–14)
NEUTROPHILS # BLD AUTO: 5.79 K/UL — SIGNIFICANT CHANGE UP (ref 1.8–7.4)
NEUTROPHILS NFR BLD AUTO: 79.3 % — HIGH (ref 43–77)
PHOSPHATE SERPL-MCNC: 2.4 MG/DL — SIGNIFICANT CHANGE UP (ref 2.4–4.7)
PLATELET # BLD AUTO: 161 K/UL — SIGNIFICANT CHANGE UP (ref 150–400)
POTASSIUM SERPL-MCNC: 3.9 MMOL/L — SIGNIFICANT CHANGE UP (ref 3.5–5.3)
POTASSIUM SERPL-SCNC: 3.9 MMOL/L — SIGNIFICANT CHANGE UP (ref 3.5–5.3)
RBC # BLD: 3.63 M/UL — LOW (ref 3.8–5.2)
RBC # FLD: 13.5 % — SIGNIFICANT CHANGE UP (ref 10.3–14.5)
SODIUM SERPL-SCNC: 139 MMOL/L — SIGNIFICANT CHANGE UP (ref 135–145)
WBC # BLD: 7.31 K/UL — SIGNIFICANT CHANGE UP (ref 3.8–10.5)
WBC # FLD AUTO: 7.31 K/UL — SIGNIFICANT CHANGE UP (ref 3.8–10.5)

## 2023-10-05 PROCEDURE — 71045 X-RAY EXAM CHEST 1 VIEW: CPT

## 2023-10-05 PROCEDURE — 86922 COMPATIBILITY TEST ANTIGLOB: CPT

## 2023-10-05 PROCEDURE — 85610 PROTHROMBIN TIME: CPT

## 2023-10-05 PROCEDURE — 75635 CT ANGIO ABDOMINAL ARTERIES: CPT

## 2023-10-05 PROCEDURE — 83735 ASSAY OF MAGNESIUM: CPT

## 2023-10-05 PROCEDURE — 84100 ASSAY OF PHOSPHORUS: CPT

## 2023-10-05 PROCEDURE — 80048 BASIC METABOLIC PNL TOTAL CA: CPT

## 2023-10-05 PROCEDURE — 87040 BLOOD CULTURE FOR BACTERIA: CPT

## 2023-10-05 PROCEDURE — 93005 ELECTROCARDIOGRAM TRACING: CPT

## 2023-10-05 PROCEDURE — A9500: CPT

## 2023-10-05 PROCEDURE — C1725: CPT

## 2023-10-05 PROCEDURE — 93017 CV STRESS TEST TRACING ONLY: CPT

## 2023-10-05 PROCEDURE — C1889: CPT

## 2023-10-05 PROCEDURE — 88311 DECALCIFY TISSUE: CPT

## 2023-10-05 PROCEDURE — 81001 URINALYSIS AUTO W/SCOPE: CPT

## 2023-10-05 PROCEDURE — 86900 BLOOD TYPING SEROLOGIC ABO: CPT

## 2023-10-05 PROCEDURE — 93306 TTE W/DOPPLER COMPLETE: CPT

## 2023-10-05 PROCEDURE — 36415 COLL VENOUS BLD VENIPUNCTURE: CPT

## 2023-10-05 PROCEDURE — C9399: CPT

## 2023-10-05 PROCEDURE — 93970 EXTREMITY STUDY: CPT

## 2023-10-05 PROCEDURE — 80053 COMPREHEN METABOLIC PANEL: CPT

## 2023-10-05 PROCEDURE — 85025 COMPLETE CBC W/AUTO DIFF WBC: CPT

## 2023-10-05 PROCEDURE — 86901 BLOOD TYPING SEROLOGIC RH(D): CPT

## 2023-10-05 PROCEDURE — C1874: CPT

## 2023-10-05 PROCEDURE — 85027 COMPLETE CBC AUTOMATED: CPT

## 2023-10-05 PROCEDURE — 88304 TISSUE EXAM BY PATHOLOGIST: CPT

## 2023-10-05 PROCEDURE — 93971 EXTREMITY STUDY: CPT

## 2023-10-05 PROCEDURE — 93926 LOWER EXTREMITY STUDY: CPT

## 2023-10-05 PROCEDURE — 86850 RBC ANTIBODY SCREEN: CPT

## 2023-10-05 PROCEDURE — 99285 EMERGENCY DEPT VISIT HI MDM: CPT

## 2023-10-05 PROCEDURE — C1887: CPT

## 2023-10-05 PROCEDURE — C1769: CPT

## 2023-10-05 PROCEDURE — 76000 FLUOROSCOPY <1 HR PHYS/QHP: CPT

## 2023-10-05 PROCEDURE — 85730 THROMBOPLASTIN TIME PARTIAL: CPT

## 2023-10-05 PROCEDURE — 87635 SARS-COV-2 COVID-19 AMP PRB: CPT

## 2023-10-05 PROCEDURE — 78452 HT MUSCLE IMAGE SPECT MULT: CPT

## 2023-10-05 PROCEDURE — C1894: CPT

## 2023-10-05 PROCEDURE — 93923 UPR/LXTR ART STDY 3+ LVLS: CPT

## 2023-10-05 RX ORDER — COLLAGENASE CLOSTRIDIUM HIST. 250 UNIT/G
1 OINTMENT (GRAM) TOPICAL
Qty: 1 | Refills: 0
Start: 2023-10-05 | End: 2023-11-01

## 2023-10-05 RX ORDER — NICOTINE POLACRILEX 2 MG
14 GUM BUCCAL
Qty: 30 | Refills: 0
Start: 2023-10-05 | End: 2023-11-03

## 2023-10-05 RX ORDER — IBUPROFEN 200 MG
1 TABLET ORAL
Qty: 20 | Refills: 0
Start: 2023-10-05 | End: 2023-10-09

## 2023-10-05 RX ORDER — CLOPIDOGREL BISULFATE 75 MG/1
1 TABLET, FILM COATED ORAL
Qty: 30 | Refills: 3
Start: 2023-10-05 | End: 2024-02-01

## 2023-10-05 RX ORDER — CEPHALEXIN 500 MG
1 CAPSULE ORAL
Qty: 28 | Refills: 0
Start: 2023-10-05 | End: 2023-10-11

## 2023-10-05 RX ADMIN — Medication 975 MILLIGRAM(S): at 06:57

## 2023-10-05 RX ADMIN — HEPARIN SODIUM 5000 UNIT(S): 5000 INJECTION INTRAVENOUS; SUBCUTANEOUS at 05:57

## 2023-10-05 RX ADMIN — Medication 975 MILLIGRAM(S): at 13:05

## 2023-10-05 RX ADMIN — CLOPIDOGREL BISULFATE 75 MILLIGRAM(S): 75 TABLET, FILM COATED ORAL at 13:05

## 2023-10-05 RX ADMIN — PIPERACILLIN AND TAZOBACTAM 25 GRAM(S): 4; .5 INJECTION, POWDER, LYOPHILIZED, FOR SOLUTION INTRAVENOUS at 05:57

## 2023-10-05 RX ADMIN — Medication 1 PATCH: at 13:06

## 2023-10-05 RX ADMIN — Medication 1 PATCH: at 07:30

## 2023-10-05 RX ADMIN — Medication 81 MILLIGRAM(S): at 13:05

## 2023-10-05 RX ADMIN — Medication 975 MILLIGRAM(S): at 00:06

## 2023-10-05 RX ADMIN — LISINOPRIL 2.5 MILLIGRAM(S): 2.5 TABLET ORAL at 05:58

## 2023-10-05 RX ADMIN — Medication 1 PATCH: at 13:08

## 2023-10-05 RX ADMIN — Medication 975 MILLIGRAM(S): at 05:57

## 2023-10-05 NOTE — DISCHARGE NOTE NURSING/CASE MANAGEMENT/SOCIAL WORK - NSDCPEWEB_GEN_ALL_CORE
[facility] for Tobacco Control website --- [email or web address] website --- [email or web address]

## 2023-10-05 NOTE — PROGRESS NOTE ADULT - ASSESSMENT
A-Patient is a 62 y/o F presented with a RLE non healing wound, and RLE rest pain. POD 3 from R Common femoral Artery endarterectomy, SFA  angioplasty and stent placement. Her rest pain has since resolved, mild pain in groin. Normotensive over night, abx for slight blanching erythema around groin op site. No hematoma, Hb trending up from 10.5 to 11.1.      P-  -Continue home meds  - Pain control with Tylenol only   -Local wound care with collagenase   -Discharge planning w/ po abx   A-Patient is a 62 y/o F presented with a RLE non healing wound, and RLE rest pain. POD 3 from R Common femoral Artery endarterectomy, SFA  angioplasty and stent placement. Her rest pain has since resolved, mild pain in groin. Normotensive over night, abx for slight blanching erythema around groin op site. No hematoma, Hb trending up from 10.5 to 11.1.      P-  -Continue home meds  - Pain control with Tylenol only   -Local wound care with collagenase   -Discharge planning w/ po abx, will await attending's follow up eval       --- Patient seen with student, adjustment made to the above note where applicable

## 2023-10-05 NOTE — PROGRESS NOTE ADULT - SUBJECTIVE AND OBJECTIVE BOX
S- 64 y/o F POD 3  R Common Femoral Endarterectomy, R SFA angioplasty and stent placement for CLI, RLE non healing wound and rest pain. Pt is doing well, ambulating, normotensive, normothermic overnight, NAEON    MEDICATIONS  (STANDING):  acetaminophen     Tablet .. 975 milliGRAM(s) Oral every 6 hours  aspirin  chewable 81 milliGRAM(s) Oral daily  atorvastatin 20 milliGRAM(s) Oral at bedtime  clopidogrel Tablet 75 milliGRAM(s) Oral daily  collagenase Ointment 1 Application(s) Topical daily  heparin   Injectable 5000 Unit(s) SubCutaneous every 8 hours  lisinopril 2.5 milliGRAM(s) Oral daily  nicotine -  14 mG/24Hr(s) Patch 1 Patch Transdermal daily  piperacillin/tazobactam IVPB.. 3.375 Gram(s) IV Intermittent every 8 hours  senna 2 Tablet(s) Oral at bedtime    MEDICATIONS  (PRN):  aluminum hydroxide/magnesium hydroxide/simethicone Suspension 30 milliLiter(s) Oral every 4 hours PRN Dyspepsia                   11.1   10.73 )-----------( 157      ( 04 Oct 2023 11:30 )             34.2   10-04    136  |  104  |  19.2  ----------------------------<  114<H>  4.1   |  23.0  |  1.15    Ca    8.4      04 Oct 2023 05:11  Phos  2.7     10-04  Mg     1.5     10-04      O- Vital Signs Last 24 Hrs  T(C): 37 (05 Oct 2023 05:09), Max: 37.4 (04 Oct 2023 07:55)  T(F): 98.6 (05 Oct 2023 05:09), Max: 99.3 (04 Oct 2023 07:55)  HR: 90 (05 Oct 2023 05:57) (77 - 90)  BP: 130/70 (05 Oct 2023 05:57) (104/55 - 143/64)  BP(mean): --  RR: 18 (05 Oct 2023 05:09) (18 - 20)  SpO2: 95% (05 Oct 2023 05:09) (93% - 98%)    Parameters below as of 05 Oct 2023 05:09  Patient On (Oxygen Delivery Method): nasal cannula  O2 Flow (L/min): 1    Gen: Pt sitting in bed comfortably, NAD  Cardiac: RRR  Pulm: Non labored breathing, RR  MSK: moving all extremities well, sensation intact. RLE wound healing well, R groin op site healing well with slight blanching erythema. Biphasic DP pulses B/FL     S- 64 y/o F POD 3  R Common Femoral Endarterectomy, R SFA angioplasty and stent placement for CLI, RLE non healing wound and rest pain. Pt is doing well, ambulating, normotensive, normothermic overnight, NAEON    MEDICATIONS  (STANDING):  acetaminophen     Tablet .. 975 milliGRAM(s) Oral every 6 hours  aspirin  chewable 81 milliGRAM(s) Oral daily  atorvastatin 20 milliGRAM(s) Oral at bedtime  clopidogrel Tablet 75 milliGRAM(s) Oral daily  collagenase Ointment 1 Application(s) Topical daily  heparin   Injectable 5000 Unit(s) SubCutaneous every 8 hours  lisinopril 2.5 milliGRAM(s) Oral daily  nicotine -  14 mG/24Hr(s) Patch 1 Patch Transdermal daily  piperacillin/tazobactam IVPB.. 3.375 Gram(s) IV Intermittent every 8 hours  senna 2 Tablet(s) Oral at bedtime    MEDICATIONS  (PRN):  aluminum hydroxide/magnesium hydroxide/simethicone Suspension 30 milliLiter(s) Oral every 4 hours PRN Dyspepsia                   11.1   10.73 )-----------( 157      ( 04 Oct 2023 11:30 )             34.2   10-04    136  |  104  |  19.2  ----------------------------<  114<H>  4.1   |  23.0  |  1.15    Ca    8.4      04 Oct 2023 05:11  Phos  2.7     10-04  Mg     1.5     10-04      O- Vital Signs Last 24 Hrs  T(C): 37 (05 Oct 2023 05:09), Max: 37.4 (04 Oct 2023 07:55)  T(F): 98.6 (05 Oct 2023 05:09), Max: 99.3 (04 Oct 2023 07:55)  HR: 90 (05 Oct 2023 05:57) (77 - 90)  BP: 130/70 (05 Oct 2023 05:57) (104/55 - 143/64)  BP(mean): --  RR: 18 (05 Oct 2023 05:09) (18 - 20)  SpO2: 95% (05 Oct 2023 05:09) (93% - 98%)    Parameters below as of 05 Oct 2023 05:09  Patient On (Oxygen Delivery Method): nasal cannula  O2 Flow (L/min): 1    Gen: Pt sitting in bed comfortably, NAD  Cardiac: RRR  Pulm: Non labored breathing, RR  MSK: moving all extremities well, sensation intact. RLE groin intact with staples, no surrounding erythema or drainage, R calf wound with fibrinous exudates, surrounding blanching erythema. Biphasic DP pulses B/FL

## 2023-10-05 NOTE — PROGRESS NOTE ADULT - PROVIDER SPECIALTY LIST ADULT
Vascular Surgery
Cardiology
Vascular Surgery

## 2023-10-05 NOTE — DISCHARGE NOTE NURSING/CASE MANAGEMENT/SOCIAL WORK - NSDCPEFALRISK_GEN_ALL_CORE
For information on Fall & Injury Prevention, visit: [email or web address] OR  [email or web address] OR  https://www.cdc.gov/steadi/patient.html

## 2023-10-05 NOTE — DISCHARGE NOTE NURSING/CASE MANAGEMENT/SOCIAL WORK - NSDCPEEMAIL_GEN_ALL_CORE
[facility] for Tobacco Control email [email or web address]

## 2023-10-05 NOTE — DISCHARGE NOTE NURSING/CASE MANAGEMENT/SOCIAL WORK - PATIENT PORTAL LINK FT
You can access the FollowMyHealth Patient Portal offered by [facility] by registering at the following website: [email or web address]. By joining [employer] [facility]’s FollowMyHealth portal, you will also be able to view your health information using other applications (apps) compatible with our system.

## 2023-10-09 LAB
CULTURE RESULTS: SIGNIFICANT CHANGE UP
CULTURE RESULTS: SIGNIFICANT CHANGE UP
SPECIMEN SOURCE: SIGNIFICANT CHANGE UP
SPECIMEN SOURCE: SIGNIFICANT CHANGE UP
SURGICAL PATHOLOGY STUDY: SIGNIFICANT CHANGE UP

## 2023-10-17 ENCOUNTER — APPOINTMENT (OUTPATIENT)
Dept: VASCULAR SURGERY | Facility: CLINIC | Age: 63
End: 2023-10-17
Payer: MEDICAID

## 2023-10-17 VITALS
HEART RATE: 78 BPM | HEIGHT: 65.5 IN | WEIGHT: 215 LBS | TEMPERATURE: 98 F | SYSTOLIC BLOOD PRESSURE: 162 MMHG | OXYGEN SATURATION: 97 % | DIASTOLIC BLOOD PRESSURE: 80 MMHG | RESPIRATION RATE: 16 BRPM | BODY MASS INDEX: 35.39 KG/M2

## 2023-10-17 DIAGNOSIS — I70.229 ATHEROSCLEROSIS OF NATIVE ARTERIES OF EXTREMITIES WITH REST PAIN, UNSPECIFIED EXTREMITY: ICD-10-CM

## 2023-10-17 PROCEDURE — 93922 UPR/L XTREMITY ART 2 LEVELS: CPT

## 2023-10-17 PROCEDURE — 99024 POSTOP FOLLOW-UP VISIT: CPT

## 2023-10-24 ENCOUNTER — APPOINTMENT (OUTPATIENT)
Dept: VASCULAR SURGERY | Facility: CLINIC | Age: 63
End: 2023-10-24
Payer: MEDICAID

## 2023-10-24 ENCOUNTER — INPATIENT (INPATIENT)
Facility: HOSPITAL | Age: 63
LOS: 5 days | Discharge: HOME | DRG: 857 | End: 2023-10-30
Attending: SURGERY | Admitting: SURGERY
Payer: MEDICAID

## 2023-10-24 ENCOUNTER — NON-APPOINTMENT (OUTPATIENT)
Age: 63
End: 2023-10-24

## 2023-10-24 VITALS
WEIGHT: 218.92 LBS | OXYGEN SATURATION: 95 % | HEIGHT: 66 IN | HEART RATE: 95 BPM | SYSTOLIC BLOOD PRESSURE: 142 MMHG | DIASTOLIC BLOOD PRESSURE: 87 MMHG | RESPIRATION RATE: 20 BRPM | TEMPERATURE: 98 F

## 2023-10-24 VITALS
HEART RATE: 90 BPM | BODY MASS INDEX: 35.88 KG/M2 | SYSTOLIC BLOOD PRESSURE: 145 MMHG | HEIGHT: 65.5 IN | DIASTOLIC BLOOD PRESSURE: 78 MMHG | RESPIRATION RATE: 14 BRPM | OXYGEN SATURATION: 97 % | WEIGHT: 218 LBS | TEMPERATURE: 97 F

## 2023-10-24 DIAGNOSIS — Z95.828 PRESENCE OF OTHER VASCULAR IMPLANTS AND GRAFTS: Chronic | ICD-10-CM

## 2023-10-24 DIAGNOSIS — Z96.641 PRESENCE OF RIGHT ARTIFICIAL HIP JOINT: Chronic | ICD-10-CM

## 2023-10-24 DIAGNOSIS — T81.30XA DISRUPTION OF WOUND, UNSPECIFIED, INITIAL ENCOUNTER: ICD-10-CM

## 2023-10-24 LAB
ALBUMIN SERPL ELPH-MCNC: 4.1 G/DL — SIGNIFICANT CHANGE UP (ref 3.3–5.2)
ALBUMIN SERPL ELPH-MCNC: 4.1 G/DL — SIGNIFICANT CHANGE UP (ref 3.3–5.2)
ALP SERPL-CCNC: 114 U/L — SIGNIFICANT CHANGE UP (ref 40–120)
ALP SERPL-CCNC: 114 U/L — SIGNIFICANT CHANGE UP (ref 40–120)
ALT FLD-CCNC: 10 U/L — SIGNIFICANT CHANGE UP
ALT FLD-CCNC: 10 U/L — SIGNIFICANT CHANGE UP
ANION GAP SERPL CALC-SCNC: 14 MMOL/L — SIGNIFICANT CHANGE UP (ref 5–17)
ANION GAP SERPL CALC-SCNC: 14 MMOL/L — SIGNIFICANT CHANGE UP (ref 5–17)
APTT BLD: 31.7 SEC — SIGNIFICANT CHANGE UP (ref 24.5–35.6)
APTT BLD: 31.7 SEC — SIGNIFICANT CHANGE UP (ref 24.5–35.6)
AST SERPL-CCNC: 16 U/L — SIGNIFICANT CHANGE UP
AST SERPL-CCNC: 16 U/L — SIGNIFICANT CHANGE UP
BASOPHILS # BLD AUTO: 0.03 K/UL — SIGNIFICANT CHANGE UP (ref 0–0.2)
BASOPHILS # BLD AUTO: 0.03 K/UL — SIGNIFICANT CHANGE UP (ref 0–0.2)
BASOPHILS NFR BLD AUTO: 0.4 % — SIGNIFICANT CHANGE UP (ref 0–2)
BASOPHILS NFR BLD AUTO: 0.4 % — SIGNIFICANT CHANGE UP (ref 0–2)
BILIRUB SERPL-MCNC: 0.3 MG/DL — LOW (ref 0.4–2)
BILIRUB SERPL-MCNC: 0.3 MG/DL — LOW (ref 0.4–2)
BUN SERPL-MCNC: 11.5 MG/DL — SIGNIFICANT CHANGE UP (ref 8–20)
BUN SERPL-MCNC: 11.5 MG/DL — SIGNIFICANT CHANGE UP (ref 8–20)
CALCIUM SERPL-MCNC: 9.4 MG/DL — SIGNIFICANT CHANGE UP (ref 8.4–10.5)
CALCIUM SERPL-MCNC: 9.4 MG/DL — SIGNIFICANT CHANGE UP (ref 8.4–10.5)
CHLORIDE SERPL-SCNC: 100 MMOL/L — SIGNIFICANT CHANGE UP (ref 96–108)
CHLORIDE SERPL-SCNC: 100 MMOL/L — SIGNIFICANT CHANGE UP (ref 96–108)
CO2 SERPL-SCNC: 24 MMOL/L — SIGNIFICANT CHANGE UP (ref 22–29)
CO2 SERPL-SCNC: 24 MMOL/L — SIGNIFICANT CHANGE UP (ref 22–29)
CREAT SERPL-MCNC: 0.9 MG/DL — SIGNIFICANT CHANGE UP (ref 0.5–1.3)
CREAT SERPL-MCNC: 0.9 MG/DL — SIGNIFICANT CHANGE UP (ref 0.5–1.3)
EGFR: 72 ML/MIN/1.73M2 — SIGNIFICANT CHANGE UP
EGFR: 72 ML/MIN/1.73M2 — SIGNIFICANT CHANGE UP
EOSINOPHIL # BLD AUTO: 0.35 K/UL — SIGNIFICANT CHANGE UP (ref 0–0.5)
EOSINOPHIL # BLD AUTO: 0.35 K/UL — SIGNIFICANT CHANGE UP (ref 0–0.5)
EOSINOPHIL NFR BLD AUTO: 4.7 % — SIGNIFICANT CHANGE UP (ref 0–6)
EOSINOPHIL NFR BLD AUTO: 4.7 % — SIGNIFICANT CHANGE UP (ref 0–6)
GLUCOSE SERPL-MCNC: 112 MG/DL — HIGH (ref 70–99)
GLUCOSE SERPL-MCNC: 112 MG/DL — HIGH (ref 70–99)
HCT VFR BLD CALC: 38.1 % — SIGNIFICANT CHANGE UP (ref 34.5–45)
HCT VFR BLD CALC: 38.1 % — SIGNIFICANT CHANGE UP (ref 34.5–45)
HGB BLD-MCNC: 12.5 G/DL — SIGNIFICANT CHANGE UP (ref 11.5–15.5)
HGB BLD-MCNC: 12.5 G/DL — SIGNIFICANT CHANGE UP (ref 11.5–15.5)
IMM GRANULOCYTES NFR BLD AUTO: 0.3 % — SIGNIFICANT CHANGE UP (ref 0–0.9)
IMM GRANULOCYTES NFR BLD AUTO: 0.3 % — SIGNIFICANT CHANGE UP (ref 0–0.9)
INR BLD: 0.92 RATIO — SIGNIFICANT CHANGE UP (ref 0.85–1.18)
INR BLD: 0.92 RATIO — SIGNIFICANT CHANGE UP (ref 0.85–1.18)
LYMPHOCYTES # BLD AUTO: 1.86 K/UL — SIGNIFICANT CHANGE UP (ref 1–3.3)
LYMPHOCYTES # BLD AUTO: 1.86 K/UL — SIGNIFICANT CHANGE UP (ref 1–3.3)
LYMPHOCYTES # BLD AUTO: 25.2 % — SIGNIFICANT CHANGE UP (ref 13–44)
LYMPHOCYTES # BLD AUTO: 25.2 % — SIGNIFICANT CHANGE UP (ref 13–44)
MCHC RBC-ENTMCNC: 29.3 PG — SIGNIFICANT CHANGE UP (ref 27–34)
MCHC RBC-ENTMCNC: 29.3 PG — SIGNIFICANT CHANGE UP (ref 27–34)
MCHC RBC-ENTMCNC: 32.8 GM/DL — SIGNIFICANT CHANGE UP (ref 32–36)
MCHC RBC-ENTMCNC: 32.8 GM/DL — SIGNIFICANT CHANGE UP (ref 32–36)
MCV RBC AUTO: 89.2 FL — SIGNIFICANT CHANGE UP (ref 80–100)
MCV RBC AUTO: 89.2 FL — SIGNIFICANT CHANGE UP (ref 80–100)
MONOCYTES # BLD AUTO: 0.6 K/UL — SIGNIFICANT CHANGE UP (ref 0–0.9)
MONOCYTES # BLD AUTO: 0.6 K/UL — SIGNIFICANT CHANGE UP (ref 0–0.9)
MONOCYTES NFR BLD AUTO: 8.1 % — SIGNIFICANT CHANGE UP (ref 2–14)
MONOCYTES NFR BLD AUTO: 8.1 % — SIGNIFICANT CHANGE UP (ref 2–14)
NEUTROPHILS # BLD AUTO: 4.52 K/UL — SIGNIFICANT CHANGE UP (ref 1.8–7.4)
NEUTROPHILS # BLD AUTO: 4.52 K/UL — SIGNIFICANT CHANGE UP (ref 1.8–7.4)
NEUTROPHILS NFR BLD AUTO: 61.3 % — SIGNIFICANT CHANGE UP (ref 43–77)
NEUTROPHILS NFR BLD AUTO: 61.3 % — SIGNIFICANT CHANGE UP (ref 43–77)
PLATELET # BLD AUTO: 235 K/UL — SIGNIFICANT CHANGE UP (ref 150–400)
PLATELET # BLD AUTO: 235 K/UL — SIGNIFICANT CHANGE UP (ref 150–400)
POTASSIUM SERPL-MCNC: 4.2 MMOL/L — SIGNIFICANT CHANGE UP (ref 3.5–5.3)
POTASSIUM SERPL-MCNC: 4.2 MMOL/L — SIGNIFICANT CHANGE UP (ref 3.5–5.3)
POTASSIUM SERPL-SCNC: 4.2 MMOL/L — SIGNIFICANT CHANGE UP (ref 3.5–5.3)
POTASSIUM SERPL-SCNC: 4.2 MMOL/L — SIGNIFICANT CHANGE UP (ref 3.5–5.3)
PROT SERPL-MCNC: 7 G/DL — SIGNIFICANT CHANGE UP (ref 6.6–8.7)
PROT SERPL-MCNC: 7 G/DL — SIGNIFICANT CHANGE UP (ref 6.6–8.7)
PROTHROM AB SERPL-ACNC: 10.2 SEC — SIGNIFICANT CHANGE UP (ref 9.5–13)
PROTHROM AB SERPL-ACNC: 10.2 SEC — SIGNIFICANT CHANGE UP (ref 9.5–13)
RBC # BLD: 4.27 M/UL — SIGNIFICANT CHANGE UP (ref 3.8–5.2)
RBC # BLD: 4.27 M/UL — SIGNIFICANT CHANGE UP (ref 3.8–5.2)
RBC # FLD: 13.6 % — SIGNIFICANT CHANGE UP (ref 10.3–14.5)
RBC # FLD: 13.6 % — SIGNIFICANT CHANGE UP (ref 10.3–14.5)
SODIUM SERPL-SCNC: 138 MMOL/L — SIGNIFICANT CHANGE UP (ref 135–145)
SODIUM SERPL-SCNC: 138 MMOL/L — SIGNIFICANT CHANGE UP (ref 135–145)
WBC # BLD: 7.38 K/UL — SIGNIFICANT CHANGE UP (ref 3.8–10.5)
WBC # BLD: 7.38 K/UL — SIGNIFICANT CHANGE UP (ref 3.8–10.5)
WBC # FLD AUTO: 7.38 K/UL — SIGNIFICANT CHANGE UP (ref 3.8–10.5)
WBC # FLD AUTO: 7.38 K/UL — SIGNIFICANT CHANGE UP (ref 3.8–10.5)

## 2023-10-24 PROCEDURE — 71045 X-RAY EXAM CHEST 1 VIEW: CPT | Mod: 26

## 2023-10-24 PROCEDURE — 99024 POSTOP FOLLOW-UP VISIT: CPT

## 2023-10-24 PROCEDURE — 99223 1ST HOSP IP/OBS HIGH 75: CPT | Mod: 24

## 2023-10-24 PROCEDURE — 99223 1ST HOSP IP/OBS HIGH 75: CPT

## 2023-10-24 PROCEDURE — 93010 ELECTROCARDIOGRAM REPORT: CPT

## 2023-10-24 PROCEDURE — 99285 EMERGENCY DEPT VISIT HI MDM: CPT

## 2023-10-24 RX ORDER — PIPERACILLIN AND TAZOBACTAM 4; .5 G/20ML; G/20ML
3.38 INJECTION, POWDER, LYOPHILIZED, FOR SOLUTION INTRAVENOUS EVERY 8 HOURS
Refills: 0 | Status: DISCONTINUED | OUTPATIENT
Start: 2023-10-25 | End: 2023-10-25

## 2023-10-24 RX ORDER — ACETAMINOPHEN 500 MG
650 TABLET ORAL EVERY 6 HOURS
Refills: 0 | Status: DISCONTINUED | OUTPATIENT
Start: 2023-10-24 | End: 2023-10-25

## 2023-10-24 RX ORDER — NICOTINE POLACRILEX 2 MG
1 GUM BUCCAL DAILY
Refills: 0 | Status: DISCONTINUED | OUTPATIENT
Start: 2023-10-24 | End: 2023-10-25

## 2023-10-24 RX ORDER — ONDANSETRON 8 MG/1
4 TABLET, FILM COATED ORAL EVERY 6 HOURS
Refills: 0 | Status: DISCONTINUED | OUTPATIENT
Start: 2023-10-24 | End: 2023-10-25

## 2023-10-24 RX ORDER — PIPERACILLIN AND TAZOBACTAM 4; .5 G/20ML; G/20ML
3.38 INJECTION, POWDER, LYOPHILIZED, FOR SOLUTION INTRAVENOUS ONCE
Refills: 0 | Status: COMPLETED | OUTPATIENT
Start: 2023-10-24 | End: 2023-10-24

## 2023-10-24 RX ORDER — SENNA PLUS 8.6 MG/1
2 TABLET ORAL AT BEDTIME
Refills: 0 | Status: DISCONTINUED | OUTPATIENT
Start: 2023-10-24 | End: 2023-10-25

## 2023-10-24 RX ORDER — OXYCODONE HYDROCHLORIDE 5 MG/1
5 TABLET ORAL EVERY 4 HOURS
Refills: 0 | Status: DISCONTINUED | OUTPATIENT
Start: 2023-10-24 | End: 2023-10-25

## 2023-10-24 RX ORDER — OXYCODONE HYDROCHLORIDE 5 MG/1
10 TABLET ORAL EVERY 4 HOURS
Refills: 0 | Status: DISCONTINUED | OUTPATIENT
Start: 2023-10-24 | End: 2023-10-25

## 2023-10-24 RX ORDER — HYDROMORPHONE HYDROCHLORIDE 2 MG/ML
0.5 INJECTION INTRAMUSCULAR; INTRAVENOUS; SUBCUTANEOUS EVERY 4 HOURS
Refills: 0 | Status: DISCONTINUED | OUTPATIENT
Start: 2023-10-24 | End: 2023-10-25

## 2023-10-24 RX ORDER — INFLUENZA VIRUS VACCINE 15; 15; 15; 15 UG/.5ML; UG/.5ML; UG/.5ML; UG/.5ML
0.5 SUSPENSION INTRAMUSCULAR ONCE
Refills: 0 | Status: DISCONTINUED | OUTPATIENT
Start: 2023-10-24 | End: 2023-10-30

## 2023-10-24 RX ADMIN — Medication 1 PATCH: at 19:51

## 2023-10-24 RX ADMIN — OXYCODONE HYDROCHLORIDE 10 MILLIGRAM(S): 5 TABLET ORAL at 16:42

## 2023-10-24 RX ADMIN — OXYCODONE HYDROCHLORIDE 10 MILLIGRAM(S): 5 TABLET ORAL at 17:03

## 2023-10-24 RX ADMIN — Medication 1 PATCH: at 16:42

## 2023-10-24 RX ADMIN — PIPERACILLIN AND TAZOBACTAM 25 GRAM(S): 4; .5 INJECTION, POWDER, LYOPHILIZED, FOR SOLUTION INTRAVENOUS at 21:04

## 2023-10-24 RX ADMIN — OXYCODONE HYDROCHLORIDE 10 MILLIGRAM(S): 5 TABLET ORAL at 23:21

## 2023-10-24 RX ADMIN — PIPERACILLIN AND TAZOBACTAM 200 GRAM(S): 4; .5 INJECTION, POWDER, LYOPHILIZED, FOR SOLUTION INTRAVENOUS at 16:43

## 2023-10-24 NOTE — CONSULT NOTE ADULT - ASSESSMENT
63F active smoker with history of HTN, PAD, obesity, s/p right femoral endarterectomy, right femoral angioplasty with stent placement on 10/2 was sent for admission for right surgical wound dehiscence    - Right femoral wound dehiscence without purulence   - For OR exploration tomorrow  - Started on empiric piperacillin-tazobactam   - obtain surgical cultures   - would check MRSA nasal swab   - No leukocytosis; normal differential   - Afebrile and hemodynamically stable     d/w team     Will follow

## 2023-10-24 NOTE — ED PROVIDER NOTE - PHYSICAL EXAMINATION
Constitutional - well-developed.   Head - NCAT. Airway patent.   Eyes - PERRL.   CV - RRR. no murmur. no edema.   Pulm - CTAB.   Abd - soft, nt. no rebound. no guarding.   Neuro - A&Ox3. strength 5/5 x4. sensation intact x4. normal gait.   Skin - wounds dressed. just evaluated in office by vascular surgeon. will not reopen dressings. pulses dopplerable in the office.  MSK - normal ROM.

## 2023-10-24 NOTE — ED ADULT NURSE NOTE - OBJECTIVE STATEMENT
Aox4. Pt presenting to Emergency Department sent in by surgeon. Pt reports having follow up appointment, in which surgeon suggested Emergency Department visit due to wound dehiscence to R groin. Reports having surgery to remove blockage in R leg. Surgery site covered with dressing. Denies chest pain, SOB, abd pain, back pain, headaches, dizziness, lightheadedness, fevers, chills, nausea, vomiting, diarrhea, constipation and dysuria. Respirations even and unlabored. Skin warm to touch.

## 2023-10-24 NOTE — H&P ADULT - ASSESSMENT
s/p right common femoral endarterectomy  right groin dehiscence  unresolved right calf ulcer      -- admit to Dr. Blair   -- plan for surgical  intervention   tomorrow  -- IV abx, pain control, home meds  - nicotine patch

## 2023-10-24 NOTE — ED PROVIDER NOTE - CLINICAL SUMMARY MEDICAL DECISION MAKING FREE TEXT BOX
labs and imaging ordered. Pt sent in by Dr. Blair. DR. Blair to admit patient. ID called for consult.

## 2023-10-24 NOTE — PATIENT PROFILE ADULT - PACKS YRS CALCULATION
Hospital Day:  7d    Subjective:    Patient is a 66y old  Female who presents with a chief complaint of mechanical fall (08 Aug 2019 12:58)      Past Medical Hx:   No pertinent past medical history    Interval: No acute events overnight. Patient ambulating and feeling well. Awaiting 4A placement.    Past Sx:    Allergies:  No Known Allergies    Current Meds:   Standng Meds:  amLODIPine   Tablet 5 milliGRAM(s) Oral daily  ampicillin/sulbactam  IVPB 3 Gram(s) IV Intermittent every 6 hours  aspirin  chewable 81 milliGRAM(s) Oral daily  atorvastatin 20 milliGRAM(s) Oral at bedtime  chlorhexidine 4% Liquid 1 Application(s) Topical <User Schedule>  dextrose 5%. 1000 milliLiter(s) (50 mL/Hr) IV Continuous <Continuous>  dextrose 50% Injectable 12.5 Gram(s) IV Push once  dextrose 50% Injectable 25 Gram(s) IV Push once  dextrose 50% Injectable 25 Gram(s) IV Push once  docusate sodium 100 milliGRAM(s) Oral daily  fluconAZOLE IVPB 400 milliGRAM(s) IV Intermittent every 24 hours  heparin  Injectable 5000 Unit(s) SubCutaneous every 12 hours  insulin glargine Injectable (LANTUS) 15 Unit(s) SubCutaneous at bedtime  insulin lispro (HumaLOG) corrective regimen sliding scale   SubCutaneous three times a day before meals  insulin lispro Injectable (HumaLOG) 7 Unit(s) SubCutaneous three times a day before meals  losartan 50 milliGRAM(s) Oral daily    PRN Meds:  acetaminophen   Tablet .. 650 milliGRAM(s) Oral every 6 hours PRN Mild Pain (1 - 3)  ALPRAZolam 0.25 milliGRAM(s) Oral three times a day PRN anxiety  dextrose 40% Gel 15 Gram(s) Oral once PRN Blood Glucose LESS THAN 70 milliGRAM(s)/deciliter  glucagon  Injectable 1 milliGRAM(s) IntraMuscular once PRN Glucose LESS THAN 70 milligrams/deciliter  morphine  - Injectable 2 milliGRAM(s) IV Push every 6 hours PRN Severe Pain (7 - 10)  traMADol 50 milliGRAM(s) Oral every 4 hours PRN Moderate Pain (4 - 6)  zolpidem 5 milliGRAM(s) Oral at bedtime PRN Insomnia    HOME MEDICATIONS:  aspirin 81 mg oral tablet: 1 tab(s) orally once a day      Vital Signs:   T(F): 97.7 (08-08-19 @ 07:49), Max: 98 (08-07-19 @ 16:18)  HR: 77 (08-08-19 @ 13:15) (74 - 78)  BP: 124/60 (08-08-19 @ 13:15) (124/60 - 164/72)  RR: 18 (08-08-19 @ 13:15) (18 - 18)  SpO2: --      08-07-19 @ 07:01  -  08-08-19 @ 07:00  --------------------------------------------------------  IN: 960 mL / OUT: 520 mL / NET: 440 mL    08-08-19 @ 07:01  -  08-08-19 @ 14:30  --------------------------------------------------------  IN: 480 mL / OUT: 300 mL / NET: 180 mL        Physical Exam:   GENERAL: NAD  HEENT: NCAT  CHEST/LUNG: CTAB  HEART: Regular rate and rhythm; s1 s2 appreciated  ABDOMEN: Soft, Nontender, Nondistended; Bowel sounds present  EXTREMITIES: No LE edema b/l  NERVOUS SYSTEM:  Alert & Oriented X3        Labs:                         8.2    5.99  )-----------( 295      ( 08 Aug 2019 06:20 )             26.3       08 Aug 2019 06:20    140    |  104    |  15     ----------------------------<  112    4.4     |  23     |  0.8      Ca    9.5        08 Aug 2019 06:20  Mg     1.9       08 Aug 2019 06:20    TPro  6.2    /  Alb  3.0    /  TBili  0.3    /  DBili  x      /  AST  20     /  ALT  11     /  AlkPhos  99     08 Aug 2019 06:20          Hemoglobin A1C, Whole Blood: 15.3 % (08-02-19 @ 18:37)  Hemoglobin A1C, Whole Blood: 15.5 % (08-02-19 @ 05:59)                  Culture - Blood (collected 08-02-19 @ 05:59)  Source: .Blood None  Final Report (08-07-19 @ 15:04):    No growth at 5 days.    Culture - Blood (collected 08-02-19 @ 05:59)  Source: .Blood None  Final Report (08-07-19 @ 15:04):    No growth at 5 days. Hospital Day:  7d    Subjective:    Patient is a 66y old  Female who presents with a chief complaint of mechanical fall (08 Aug 2019 12:58)      Past Medical Hx:   No pertinent past medical history    Interval: No acute events overnight. Patient ambulating and feeling well. Awaiting 4A placement.    Past Sx:    Allergies:  No Known Allergies    Current Meds:   Standng Meds:  amLODIPine   Tablet 5 milliGRAM(s) Oral daily  ampicillin/sulbactam  IVPB 3 Gram(s) IV Intermittent every 6 hours  aspirin  chewable 81 milliGRAM(s) Oral daily  atorvastatin 20 milliGRAM(s) Oral at bedtime  chlorhexidine 4% Liquid 1 Application(s) Topical <User Schedule>  dextrose 5%. 1000 milliLiter(s) (50 mL/Hr) IV Continuous <Continuous>  dextrose 50% Injectable 12.5 Gram(s) IV Push once  dextrose 50% Injectable 25 Gram(s) IV Push once  dextrose 50% Injectable 25 Gram(s) IV Push once  docusate sodium 100 milliGRAM(s) Oral daily  fluconAZOLE IVPB 400 milliGRAM(s) IV Intermittent every 24 hours  heparin  Injectable 5000 Unit(s) SubCutaneous every 12 hours  insulin glargine Injectable (LANTUS) 15 Unit(s) SubCutaneous at bedtime  insulin lispro (HumaLOG) corrective regimen sliding scale   SubCutaneous three times a day before meals  insulin lispro Injectable (HumaLOG) 7 Unit(s) SubCutaneous three times a day before meals  losartan 50 milliGRAM(s) Oral daily    PRN Meds:  acetaminophen   Tablet .. 650 milliGRAM(s) Oral every 6 hours PRN Mild Pain (1 - 3)  ALPRAZolam 0.25 milliGRAM(s) Oral three times a day PRN anxiety  dextrose 40% Gel 15 Gram(s) Oral once PRN Blood Glucose LESS THAN 70 milliGRAM(s)/deciliter  glucagon  Injectable 1 milliGRAM(s) IntraMuscular once PRN Glucose LESS THAN 70 milligrams/deciliter  morphine  - Injectable 2 milliGRAM(s) IV Push every 6 hours PRN Severe Pain (7 - 10)  traMADol 50 milliGRAM(s) Oral every 4 hours PRN Moderate Pain (4 - 6)  zolpidem 5 milliGRAM(s) Oral at bedtime PRN Insomnia    HOME MEDICATIONS:  aspirin 81 mg oral tablet: 1 tab(s) orally once a day      Vital Signs:   T(F): 97.7 (08-08-19 @ 07:49), Max: 98 (08-07-19 @ 16:18)  HR: 77 (08-08-19 @ 13:15) (74 - 78)  BP: 124/60 (08-08-19 @ 13:15) (124/60 - 164/72)  RR: 18 (08-08-19 @ 13:15) (18 - 18)  SpO2: --      08-07-19 @ 07:01  -  08-08-19 @ 07:00  --------------------------------------------------------  IN: 960 mL / OUT: 520 mL / NET: 440 mL    08-08-19 @ 07:01  -  08-08-19 @ 14:30  --------------------------------------------------------  IN: 480 mL / OUT: 300 mL / NET: 180 mL        Physical Exam:   GENERAL: NAD  HEENT: NCAT  CHEST/LUNG: CTAB  HEART: Regular rate and rhythm; s1 s2 appreciated  ABDOMEN: Soft, Nontender, Nondistended; Bowel sounds present  EXTREMITIES: No LE edema b/l  NERVOUS SYSTEM:  Alert & Oriented X3        Labs:                         8.2    5.99  )-----------( 295      ( 08 Aug 2019 06:20 )             26.3       08 Aug 2019 06:20    140    |  104    |  15     ----------------------------<  112    4.4     |  23     |  0.8      Ca    9.5        08 Aug 2019 06:20  Mg     1.9       08 Aug 2019 06:20    TPro  6.2    /  Alb  3.0    /  TBili  0.3    /  DBili  x      /  AST  20     /  ALT  11     /  AlkPhos  99     08 Aug 2019 06:20          Hemoglobin A1C, Whole Blood: 15.3 % (08-02-19 @ 18:37)  Hemoglobin A1C, Whole Blood: 15.5 % (08-02-19 @ 05:59)      Culture - Blood (collected 08-02-19 @ 05:59)  Source: .Blood None  Final Report (08-07-19 @ 15:04):    No growth at 5 days.    Culture - Blood (collected 08-02-19 @ 05:59)  Source: .Blood None  Final Report (08-07-19 @ 15:04):    No growth at 5 days. 30

## 2023-10-24 NOTE — H&P ADULT - HISTORY OF PRESENT ILLNESS
s/p right femoral endarterectomy, known to our service right groin dehiscence, persistent right calf wound

## 2023-10-24 NOTE — PATIENT PROFILE ADULT - NSPROIMPLANTSMEDDEV_GEN_A_NUR
Problem: Occupational Therapy Goal  Goal: Occupational Therapy Goal  Goals to be met by:  2 weeks 9/13/17     Patient will increase functional independence with ADLs by performing:    UE Dressing with Supervision.  LE Dressing with Supervision.  Grooming while standing with Supervision.  Toileting from toilet with Supervision for hygiene and clothing management.   Stand pivot transfers with Supervision.  Toilet transfer to toilet with Supervision.     Outcome: Ongoing (interventions implemented as appropriate)  Goals remain appropriate.         Artificial joint

## 2023-10-24 NOTE — CONSULT NOTE ADULT - SUBJECTIVE AND OBJECTIVE BOX
[facility]  INFECTIOUS DISEASES at [place] [facility] [place]  =====================================================                               Ye Ken MD*    Ibeth Wakefield MD*                             Jay Mcdonnell MD*     Miriam De Jesus MD*            Diplomates American Board of Internal Medicine & Infectious Diseases                * [place] Office - Appt - Tel  [phone] Fax [phone]                * [place] Office - Appt - Tel [phone] Fax [phone]                                  Hospital Consult line:  [phone]  =====================================================      [id number]  KARLA TOTH        CC: Patient is a 63y old  Female who presents with a chief complaint of       HPI: 63y  Female  s/p right femoral endarterectomy, right femoral angioplasty with stent placement on 10/2 was sent for admission for right surgical wound dehiscence.   Patient denies drainage; she noted the wound to be opened but didn't know she had to report it. Not on antibiotics at home. Has an additional small wound on distal leg that she attributes to a bud bite. No fever or chills.     ID input request for wound dehiscence.   ______________________________________________________  PAST MEDICAL & SURGICAL HISTORY:  HLD (hyperlipidemia)    PAD (peripheral artery disease)    Smoker active    Osteomyelitis  LLE leg    Allergic rhinitis, seasonal    PVD (peripheral vascular disease)    History of hip replacement, total, right    S/P femoral-femoral bypass surgery  with stents      Social History:  Active smoker  Lives alone     FAMILY HISTORY:  FH: diabetes mellitus (Father)    FH: CVA (cerebrovascular accident) (Mother)        ______________________________________________________  Allergies    vancomycin (Rash; Flushing; Hives)    Intolerances        ______________________________________________________  MEDICATIONS:  Antibiotics:  piperacillin/tazobactam IVPB.- 3.375 Gram(s) IV Intermittent once    Other medications:  multivitamin 1 Tablet(s) Oral daily  nicotine - 21 mG/24Hr(s) Patch 1 Patch Transdermal daily    ______________________________________________________  REVIEW OF SYSTEMS:  CONSTITUTIONAL:  No fever or chills  HEENT:  No diplopia or blurred vision.  No earache, sore throat or runny nose.  CARDIOVASCULAR:  No chest pain  RESPIRATORY:  No cough, shortness of breath  GASTROINTESTINAL:  No nausea, vomiting, abdominal pain or diarrhea.  GENITOURINARY:  No dysuria, frequency or urgency. No blood in urine  MUSCULOSKELETAL:  no joint aches, no muscle pain  SKIN:  as per HPI   NEUROLOGIC:  No headaches, seizures  PSYCHIATRIC:  No disorder of thought or mood.  ENDOCRINE:  No heat or cold intolerance  HEMATOLOGICAL:  No easy bruising or bleeding.     _____________________________________________________  PHYSICAL EXAM:  GEN: AAOX4, in NAD   HEENT: normocephalic and atraumatic.  PERRL.  Anicteric sclerae. Moist mucous membranes. No mucosal lesions. No nasal discharge.   NECK: Supple. No palpable neck masses or LN  LUNGS: eupneic, CTA B/L, no adventitious sounds  HEART: RRR, no m/r/g  ABDOMEN: Soft, NT, ND, obese, no palpable masses.  +BS.    :  no Simental catheter  MSK: No joint deformity or swelling, clubbing or cyanosis   LYMPH: no palpable cervical, supraclavicular lymph nodes  NEUROLOGIC: Grossly no focal deficits   PSYCHIATRIC: Appropriate affect and mood.  SKIN: right inguinal wound packed (did not want dressing removal) with areas of skin irritation probably to adhesives. No purulence. Small wound/ulceration on lateral calf above right ankle with purulent base and mild surrounding erythema and induration (this is not related to any surgical wound)  LINES: PIV     ______________________________________________________  Height (cm): 167.6 (10-24 @ 13:34)  Weight (kg): 99.3 (10-24 @ 13:34)  BMI (kg/m2): 35.4 (10-24 @ 13:34)  BSA (m2): 2.08 (10-24 @ 13:34)    Vitals:  T(F): 97.9 (24 Oct 2023 16:05), Max: 98 (24 Oct 2023 13:34)  HR: 81 (24 Oct 2023 16:05)  BP: 123/79 (24 Oct 2023 15:32)  RR: 20 (24 Oct 2023 16:05)  SpO2: 98% (24 Oct 2023 16:05) (95% - 98%)  temp max in last 48H T(F): , Max: 98 (10-24-23 @ 13:34)    Current Antibiotics:  piperacillin/tazobactam IVPB.- 3.375 Gram(s) IV Intermittent once    Other medications:  multivitamin 1 Tablet(s) Oral daily  nicotine - 21 mG/24Hr(s) Patch 1 Patch Transdermal daily                            12.5   7.38  )-----------( 235      ( 24 Oct 2023 14:45 )             38.1     10-24    138  |  100  |  11.5  ----------------------------<  112<H>  4.2   |  24.0  |  0.90    Ca    9.4      24 Oct 2023 14:45    TPro  7.0  /  Alb  4.1  /  TBili  0.3<L>  /  DBili  x   /  AST  16  /  ALT  10  /  AlkPhos  114  10-24    RECENT CULTURES:      WBC Count: 7.38 K/uL (10-24-23 @ 14:45)  Creatinine: 0.90 mg/dL (10-24-23 @ 14:45)      COVID-19 PCR: NotDetec (10-04-23 @ 06:00)  COVID-19 PCR: NotDetec (10-02-23 @ 10:28)    ______________________________________________________  RADIOLOGY  < from: Xray Chest 1 View- PORTABLE-Urgent (Xray Chest 1 View- PORTABLE-Urgent .) (10.24.23 @ 15:26) >  FINDINGS:  CATHETERS AND TUBES: None    PULMONARY: The visualized lungs are clear of airspace consolidations or   pleural effusions.   No pneumothorax.    HEART/VASCULAR: The heart and mediastinum size and configuration are   within normal limits.    BONES: Visualized osseous thorax intact.    IMPRESSION:   No radiographic evidence of active chest disease.    < end of copied text >

## 2023-10-24 NOTE — ED ADULT NURSE NOTE - CHIEF COMPLAINT QUOTE
Pt sent in from surgeon to evaluate surgical site.  Pt states had vascular surgery on right leg approx 2 weeks ago at [facility].  Site is not healing properly.  Denies pain.

## 2023-10-24 NOTE — H&P ADULT - NS ATTEND AMEND GEN_ALL_CORE FT
Patient with right groin dehiscence.  No overt signs of infection but she has a bovine pericardial patch angioplasty.  I am not surprised given she is smoking and continues to smoke, she has a large pannus which overrides the groin, is uncertain how much she takes care of herself at home.  Plan is for washout and debridement with possible sartorius flap may require plastics evaluation as well.

## 2023-10-24 NOTE — ED PROVIDER NOTE - OBJECTIVE STATEMENT
Pt is a 64 yo F sent in by her surgeon Dr. Blair for R groin wound dehiscence. Pt had a R femoral endarterectomy on 10-2.  At follow-up today patient was found to have wound dehiscence to the R groin with greenish drainage to the R calf surgical sites. no fever/chills. no other complaints.

## 2023-10-24 NOTE — ED ADULT TRIAGE NOTE - CHIEF COMPLAINT QUOTE
Pt sent in from surgeon to evaluate surgical site.  Pt states had vascular surgery on right leg approx 2 weeks ago at [facility].  Site is not healing properly.  Denies pain.

## 2023-10-24 NOTE — ED ADULT NURSE REASSESSMENT NOTE - NS ED NURSE REASSESS COMMENT FT1
Report given to CHANDAN Dickens. Pt moved to CDU 12L. Patient awake and alert, respirations even and unlabored, in no apparent distress.  Plan, abnormal labs, history of present illness, pending labs/tests explained, opportunity to answer questions provided.

## 2023-10-25 LAB
ANION GAP SERPL CALC-SCNC: 13 MMOL/L — SIGNIFICANT CHANGE UP (ref 5–17)
ANION GAP SERPL CALC-SCNC: 13 MMOL/L — SIGNIFICANT CHANGE UP (ref 5–17)
BASOPHILS # BLD AUTO: 0 K/UL — SIGNIFICANT CHANGE UP (ref 0–0.2)
BASOPHILS # BLD AUTO: 0 K/UL — SIGNIFICANT CHANGE UP (ref 0–0.2)
BASOPHILS NFR BLD AUTO: 0 % — SIGNIFICANT CHANGE UP (ref 0–2)
BASOPHILS NFR BLD AUTO: 0 % — SIGNIFICANT CHANGE UP (ref 0–2)
BUN SERPL-MCNC: 15.4 MG/DL — SIGNIFICANT CHANGE UP (ref 8–20)
BUN SERPL-MCNC: 15.4 MG/DL — SIGNIFICANT CHANGE UP (ref 8–20)
CALCIUM SERPL-MCNC: 8.8 MG/DL — SIGNIFICANT CHANGE UP (ref 8.4–10.5)
CALCIUM SERPL-MCNC: 8.8 MG/DL — SIGNIFICANT CHANGE UP (ref 8.4–10.5)
CHLORIDE SERPL-SCNC: 106 MMOL/L — SIGNIFICANT CHANGE UP (ref 96–108)
CHLORIDE SERPL-SCNC: 106 MMOL/L — SIGNIFICANT CHANGE UP (ref 96–108)
CO2 SERPL-SCNC: 22 MMOL/L — SIGNIFICANT CHANGE UP (ref 22–29)
CO2 SERPL-SCNC: 22 MMOL/L — SIGNIFICANT CHANGE UP (ref 22–29)
CREAT SERPL-MCNC: 0.94 MG/DL — SIGNIFICANT CHANGE UP (ref 0.5–1.3)
CREAT SERPL-MCNC: 0.94 MG/DL — SIGNIFICANT CHANGE UP (ref 0.5–1.3)
EGFR: 68 ML/MIN/1.73M2 — SIGNIFICANT CHANGE UP
EGFR: 68 ML/MIN/1.73M2 — SIGNIFICANT CHANGE UP
EOSINOPHIL # BLD AUTO: 0.57 K/UL — HIGH (ref 0–0.5)
EOSINOPHIL # BLD AUTO: 0.57 K/UL — HIGH (ref 0–0.5)
EOSINOPHIL NFR BLD AUTO: 9.6 % — HIGH (ref 0–6)
EOSINOPHIL NFR BLD AUTO: 9.6 % — HIGH (ref 0–6)
GIANT PLATELETS BLD QL SMEAR: PRESENT — SIGNIFICANT CHANGE UP
GIANT PLATELETS BLD QL SMEAR: PRESENT — SIGNIFICANT CHANGE UP
GLUCOSE SERPL-MCNC: 105 MG/DL — HIGH (ref 70–99)
GLUCOSE SERPL-MCNC: 105 MG/DL — HIGH (ref 70–99)
HCT VFR BLD CALC: 34 % — LOW (ref 34.5–45)
HCT VFR BLD CALC: 34 % — LOW (ref 34.5–45)
HGB BLD-MCNC: 11 G/DL — LOW (ref 11.5–15.5)
HGB BLD-MCNC: 11 G/DL — LOW (ref 11.5–15.5)
LYMPHOCYTES # BLD AUTO: 1.55 K/UL — SIGNIFICANT CHANGE UP (ref 1–3.3)
LYMPHOCYTES # BLD AUTO: 1.55 K/UL — SIGNIFICANT CHANGE UP (ref 1–3.3)
LYMPHOCYTES # BLD AUTO: 26.3 % — SIGNIFICANT CHANGE UP (ref 13–44)
LYMPHOCYTES # BLD AUTO: 26.3 % — SIGNIFICANT CHANGE UP (ref 13–44)
MANUAL SMEAR VERIFICATION: SIGNIFICANT CHANGE UP
MANUAL SMEAR VERIFICATION: SIGNIFICANT CHANGE UP
MCHC RBC-ENTMCNC: 29.1 PG — SIGNIFICANT CHANGE UP (ref 27–34)
MCHC RBC-ENTMCNC: 29.1 PG — SIGNIFICANT CHANGE UP (ref 27–34)
MCHC RBC-ENTMCNC: 32.4 GM/DL — SIGNIFICANT CHANGE UP (ref 32–36)
MCHC RBC-ENTMCNC: 32.4 GM/DL — SIGNIFICANT CHANGE UP (ref 32–36)
MCV RBC AUTO: 89.9 FL — SIGNIFICANT CHANGE UP (ref 80–100)
MCV RBC AUTO: 89.9 FL — SIGNIFICANT CHANGE UP (ref 80–100)
MONOCYTES # BLD AUTO: 0.72 K/UL — SIGNIFICANT CHANGE UP (ref 0–0.9)
MONOCYTES # BLD AUTO: 0.72 K/UL — SIGNIFICANT CHANGE UP (ref 0–0.9)
MONOCYTES NFR BLD AUTO: 12.3 % — SIGNIFICANT CHANGE UP (ref 2–14)
MONOCYTES NFR BLD AUTO: 12.3 % — SIGNIFICANT CHANGE UP (ref 2–14)
NEUTROPHILS # BLD AUTO: 3.05 K/UL — SIGNIFICANT CHANGE UP (ref 1.8–7.4)
NEUTROPHILS # BLD AUTO: 3.05 K/UL — SIGNIFICANT CHANGE UP (ref 1.8–7.4)
NEUTROPHILS NFR BLD AUTO: 51.8 % — SIGNIFICANT CHANGE UP (ref 43–77)
NEUTROPHILS NFR BLD AUTO: 51.8 % — SIGNIFICANT CHANGE UP (ref 43–77)
PLAT MORPH BLD: NORMAL — SIGNIFICANT CHANGE UP
PLAT MORPH BLD: NORMAL — SIGNIFICANT CHANGE UP
PLATELET # BLD AUTO: 196 K/UL — SIGNIFICANT CHANGE UP (ref 150–400)
PLATELET # BLD AUTO: 196 K/UL — SIGNIFICANT CHANGE UP (ref 150–400)
POLYCHROMASIA BLD QL SMEAR: SLIGHT — SIGNIFICANT CHANGE UP
POLYCHROMASIA BLD QL SMEAR: SLIGHT — SIGNIFICANT CHANGE UP
POTASSIUM SERPL-MCNC: 4.5 MMOL/L — SIGNIFICANT CHANGE UP (ref 3.5–5.3)
POTASSIUM SERPL-MCNC: 4.5 MMOL/L — SIGNIFICANT CHANGE UP (ref 3.5–5.3)
POTASSIUM SERPL-SCNC: 4.5 MMOL/L — SIGNIFICANT CHANGE UP (ref 3.5–5.3)
POTASSIUM SERPL-SCNC: 4.5 MMOL/L — SIGNIFICANT CHANGE UP (ref 3.5–5.3)
RBC # BLD: 3.78 M/UL — LOW (ref 3.8–5.2)
RBC # BLD: 3.78 M/UL — LOW (ref 3.8–5.2)
RBC # FLD: 13.7 % — SIGNIFICANT CHANGE UP (ref 10.3–14.5)
RBC # FLD: 13.7 % — SIGNIFICANT CHANGE UP (ref 10.3–14.5)
RBC BLD AUTO: ABNORMAL
RBC BLD AUTO: ABNORMAL
SODIUM SERPL-SCNC: 141 MMOL/L — SIGNIFICANT CHANGE UP (ref 135–145)
SODIUM SERPL-SCNC: 141 MMOL/L — SIGNIFICANT CHANGE UP (ref 135–145)
WBC # BLD: 5.89 K/UL — SIGNIFICANT CHANGE UP (ref 3.8–10.5)
WBC # BLD: 5.89 K/UL — SIGNIFICANT CHANGE UP (ref 3.8–10.5)
WBC # FLD AUTO: 5.89 K/UL — SIGNIFICANT CHANGE UP (ref 3.8–10.5)
WBC # FLD AUTO: 5.89 K/UL — SIGNIFICANT CHANGE UP (ref 3.8–10.5)

## 2023-10-25 PROCEDURE — 35860 EXPLORE LIMB VESSELS: CPT | Mod: 58

## 2023-10-25 PROCEDURE — 99222 1ST HOSP IP/OBS MODERATE 55: CPT

## 2023-10-25 RX ORDER — ATORVASTATIN CALCIUM 80 MG/1
1 TABLET, FILM COATED ORAL
Qty: 0 | Refills: 0 | DISCHARGE

## 2023-10-25 RX ORDER — HYDROMORPHONE HYDROCHLORIDE 2 MG/ML
0.5 INJECTION INTRAMUSCULAR; INTRAVENOUS; SUBCUTANEOUS EVERY 4 HOURS
Refills: 0 | Status: DISCONTINUED | OUTPATIENT
Start: 2023-10-25 | End: 2023-10-25

## 2023-10-25 RX ORDER — PIPERACILLIN AND TAZOBACTAM 4; .5 G/20ML; G/20ML
3.38 INJECTION, POWDER, LYOPHILIZED, FOR SOLUTION INTRAVENOUS EVERY 8 HOURS
Refills: 0 | Status: DISCONTINUED | OUTPATIENT
Start: 2023-10-25 | End: 2023-10-30

## 2023-10-25 RX ORDER — HYDROMORPHONE HYDROCHLORIDE 2 MG/ML
0.5 INJECTION INTRAMUSCULAR; INTRAVENOUS; SUBCUTANEOUS ONCE
Refills: 0 | Status: DISCONTINUED | OUTPATIENT
Start: 2023-10-25 | End: 2023-10-25

## 2023-10-25 RX ORDER — FENTANYL CITRATE 50 UG/ML
50 INJECTION INTRAVENOUS
Refills: 0 | Status: DISCONTINUED | OUTPATIENT
Start: 2023-10-25 | End: 2023-10-25

## 2023-10-25 RX ORDER — HYDROMORPHONE HYDROCHLORIDE 2 MG/ML
0.5 INJECTION INTRAMUSCULAR; INTRAVENOUS; SUBCUTANEOUS EVERY 4 HOURS
Refills: 0 | Status: DISCONTINUED | OUTPATIENT
Start: 2023-10-25 | End: 2023-10-30

## 2023-10-25 RX ORDER — ONDANSETRON 8 MG/1
4 TABLET, FILM COATED ORAL ONCE
Refills: 0 | Status: DISCONTINUED | OUTPATIENT
Start: 2023-10-25 | End: 2023-10-25

## 2023-10-25 RX ORDER — OXYCODONE HYDROCHLORIDE 5 MG/1
10 TABLET ORAL EVERY 4 HOURS
Refills: 0 | Status: DISCONTINUED | OUTPATIENT
Start: 2023-10-25 | End: 2023-10-25

## 2023-10-25 RX ORDER — OXYCODONE HYDROCHLORIDE 5 MG/1
10 TABLET ORAL EVERY 4 HOURS
Refills: 0 | Status: DISCONTINUED | OUTPATIENT
Start: 2023-10-25 | End: 2023-10-30

## 2023-10-25 RX ORDER — FENTANYL CITRATE 50 UG/ML
25 INJECTION INTRAVENOUS
Refills: 0 | Status: DISCONTINUED | OUTPATIENT
Start: 2023-10-25 | End: 2023-10-25

## 2023-10-25 RX ORDER — ASPIRIN/CALCIUM CARB/MAGNESIUM 324 MG
81 TABLET ORAL DAILY
Refills: 0 | Status: DISCONTINUED | OUTPATIENT
Start: 2023-10-25 | End: 2023-10-30

## 2023-10-25 RX ORDER — SENNA PLUS 8.6 MG/1
1 TABLET ORAL AT BEDTIME
Refills: 0 | Status: DISCONTINUED | OUTPATIENT
Start: 2023-10-25 | End: 2023-10-30

## 2023-10-25 RX ORDER — OXYCODONE HYDROCHLORIDE 5 MG/1
5 TABLET ORAL EVERY 4 HOURS
Refills: 0 | Status: DISCONTINUED | OUTPATIENT
Start: 2023-10-25 | End: 2023-10-25

## 2023-10-25 RX ORDER — ACETAMINOPHEN 500 MG
650 TABLET ORAL EVERY 6 HOURS
Refills: 0 | Status: DISCONTINUED | OUTPATIENT
Start: 2023-10-25 | End: 2023-10-30

## 2023-10-25 RX ORDER — ACETAMINOPHEN 500 MG
1000 TABLET ORAL ONCE
Refills: 0 | Status: COMPLETED | OUTPATIENT
Start: 2023-10-25 | End: 2023-10-25

## 2023-10-25 RX ORDER — LISINOPRIL 2.5 MG/1
1 TABLET ORAL
Refills: 0 | DISCHARGE

## 2023-10-25 RX ORDER — ATORVASTATIN CALCIUM 80 MG/1
20 TABLET, FILM COATED ORAL AT BEDTIME
Refills: 0 | Status: DISCONTINUED | OUTPATIENT
Start: 2023-10-25 | End: 2023-10-30

## 2023-10-25 RX ORDER — ASPIRIN/CALCIUM CARB/MAGNESIUM 324 MG
1 TABLET ORAL
Qty: 0 | Refills: 0 | DISCHARGE

## 2023-10-25 RX ORDER — LISINOPRIL 2.5 MG/1
2.5 TABLET ORAL DAILY
Refills: 0 | Status: DISCONTINUED | OUTPATIENT
Start: 2023-10-25 | End: 2023-10-30

## 2023-10-25 RX ORDER — NICOTINE POLACRILEX 2 MG
1 GUM BUCCAL DAILY
Refills: 0 | Status: DISCONTINUED | OUTPATIENT
Start: 2023-10-25 | End: 2023-10-30

## 2023-10-25 RX ORDER — OXYCODONE HYDROCHLORIDE 5 MG/1
5 TABLET ORAL EVERY 4 HOURS
Refills: 0 | Status: DISCONTINUED | OUTPATIENT
Start: 2023-10-25 | End: 2023-10-30

## 2023-10-25 RX ORDER — CLOPIDOGREL BISULFATE 75 MG/1
75 TABLET, FILM COATED ORAL DAILY
Refills: 0 | Status: DISCONTINUED | OUTPATIENT
Start: 2023-10-25 | End: 2023-10-30

## 2023-10-25 RX ADMIN — ATORVASTATIN CALCIUM 20 MILLIGRAM(S): 80 TABLET, FILM COATED ORAL at 21:13

## 2023-10-25 RX ADMIN — OXYCODONE HYDROCHLORIDE 10 MILLIGRAM(S): 5 TABLET ORAL at 21:50

## 2023-10-25 RX ADMIN — HYDROMORPHONE HYDROCHLORIDE 0.5 MILLIGRAM(S): 2 INJECTION INTRAMUSCULAR; INTRAVENOUS; SUBCUTANEOUS at 20:29

## 2023-10-25 RX ADMIN — HYDROMORPHONE HYDROCHLORIDE 0.5 MILLIGRAM(S): 2 INJECTION INTRAMUSCULAR; INTRAVENOUS; SUBCUTANEOUS at 23:31

## 2023-10-25 RX ADMIN — HYDROMORPHONE HYDROCHLORIDE 0.5 MILLIGRAM(S): 2 INJECTION INTRAMUSCULAR; INTRAVENOUS; SUBCUTANEOUS at 21:29

## 2023-10-25 RX ADMIN — Medication 1 PATCH: at 09:50

## 2023-10-25 RX ADMIN — PIPERACILLIN AND TAZOBACTAM 25 GRAM(S): 4; .5 INJECTION, POWDER, LYOPHILIZED, FOR SOLUTION INTRAVENOUS at 21:13

## 2023-10-25 RX ADMIN — OXYCODONE HYDROCHLORIDE 10 MILLIGRAM(S): 5 TABLET ORAL at 00:15

## 2023-10-25 RX ADMIN — PIPERACILLIN AND TAZOBACTAM 25 GRAM(S): 4; .5 INJECTION, POWDER, LYOPHILIZED, FOR SOLUTION INTRAVENOUS at 05:18

## 2023-10-25 RX ADMIN — Medication 400 MILLIGRAM(S): at 17:45

## 2023-10-25 RX ADMIN — FENTANYL CITRATE 50 MICROGRAM(S): 50 INJECTION INTRAVENOUS at 17:45

## 2023-10-25 RX ADMIN — OXYCODONE HYDROCHLORIDE 10 MILLIGRAM(S): 5 TABLET ORAL at 22:50

## 2023-10-25 RX ADMIN — FENTANYL CITRATE 25 MICROGRAM(S): 50 INJECTION INTRAVENOUS at 17:30

## 2023-10-25 RX ADMIN — FENTANYL CITRATE 50 MICROGRAM(S): 50 INJECTION INTRAVENOUS at 17:31

## 2023-10-25 RX ADMIN — FENTANYL CITRATE 50 MICROGRAM(S): 50 INJECTION INTRAVENOUS at 18:15

## 2023-10-25 RX ADMIN — OXYCODONE HYDROCHLORIDE 10 MILLIGRAM(S): 5 TABLET ORAL at 10:43

## 2023-10-25 RX ADMIN — Medication 1000 MILLIGRAM(S): at 18:15

## 2023-10-25 RX ADMIN — FENTANYL CITRATE 25 MICROGRAM(S): 50 INJECTION INTRAVENOUS at 17:09

## 2023-10-25 RX ADMIN — Medication 1 PATCH: at 10:53

## 2023-10-25 RX ADMIN — PIPERACILLIN AND TAZOBACTAM 25 GRAM(S): 4; .5 INJECTION, POWDER, LYOPHILIZED, FOR SOLUTION INTRAVENOUS at 14:21

## 2023-10-25 NOTE — BRIEF OPERATIVE NOTE - NSICDXBRIEFPROCEDURE_GEN_ALL_CORE_FT
PROCEDURES:  Debridement of wound using concurrent irrigation and suction device 25-Oct-2023 16:55:50  Ayse Covarrubias

## 2023-10-25 NOTE — BRIEF OPERATIVE NOTE - OPERATION/FINDINGS
8cm area of wound dehiscence at site of prior femoral endarterectomy  Wound thoroughly debrided and irrigated  Complex closure with interrupted suture and wound vac application

## 2023-10-25 NOTE — CONSULT NOTE ADULT - SUBJECTIVE AND OBJECTIVE BOX
KARLA TOTH  [id number]      HPI:  62 y/o female PMHx HLD, tobacco, PAD s/p left sided CFA to [place] pop bypass 7/2019 and recent right femoral endarterectomy p/w right groin dehiscence and persistent right calf wound planned for wound debridement.  Patient had evaluation with echo and nuclear stress test last month prior to endarterectomy.  Patient reports no c/o at this time unrelated to the wound.  She is able to do her ADLs without a problem.  Denies CP, SOB, palps, orthopnea, syncope.        ALLERGIES:  vancomycin (Rash; Flushing; Hives)      PAST MEDICAL & SURGICAL HISTORY:  Allergic rhinitis, seasonal  History of hip replacement, total, right  Otherwise, as noted above      MEDICATIONS:  Acetaminophen     Tablet .. 650 milliGRAM(s) Oral every 6 hours PRN  HYDROmorphone  Injectable 0.5 milliGRAM(s) IV Push every 4 hours PRN  influenza   Vaccine 0.5 milliLiter(s) IntraMuscular once  multivitamin 1 Tablet(s) Oral daily  nicotine - 21 mG/24Hr(s) Patch 1 Patch Transdermal daily  ondansetron Injectable 4 milliGRAM(s) IV Push every 6 hours PRN  oxyCODONE    IR 10 milliGRAM(s) Oral every 4 hours PRN  oxyCODONE    IR 5 milliGRAM(s) Oral every 4 hours PRN  piperacillin/tazobactam IVPB.. 3.375 Gram(s) IV Intermittent every 8 hours  senna 2 Tablet(s) Oral at bedtime PRN      SOCIAL HISTORY:  Patient denies alcohol, drug use.  +Smoker.    FAMILY HISTORY:  FH: diabetes mellitus (Father)  FH: CVA (cerebrovascular accident) (Mother)        ROS:  Patient denies cough, and other than noted above full ROS is unremarkable      PHYSICAL EXAM:  Vital Signs Last 24 Hrs  T(C): 36.8 (25 Oct 2023 07:42), Max: 36.8 (24 Oct 2023 19:21)  T(F): 98.3 (25 Oct 2023 07:42), Max: 98.3 (25 Oct 2023 07:42)  HR: 73 (25 Oct 2023 07:42) (68 - 95)  BP: 116/75 (25 Oct 2023 07:42) (115/74 - 150/83)  BP(mean): --  RR: 18 (25 Oct 2023 07:42) (17 - 20)  SpO2: 94% (25 Oct 2023 07:42) (94% - 98%)  General: Patient comfortable in NAD  HEENT: NCAT, mmm, EOMI  Neck: no JVD, no carotid bruits  CVS: nl s1, split s2, no s3, no s4, no murmur or rubs, RRR  Chest: CTA b/l  Abdomen: soft, nt/nd  Extremities: No c/c/e  Neuro: A&O x3  Psych: Normal affect      ECG:  SR with no ST-T wave changes.      LABS:                        11.0   5.89  )-----------( 196      ( 25 Oct 2023 02:24 )             34.0     10-25    141  |  106  |  15.4  ----------------------------<  105<H>  4.5   |  22.0  |  0.94    Ca    8.8      25 Oct 2023 02:24    TPro  7.0  /  Alb  4.1  /  TBili  0.3<L>  /  DBili  x   /  AST  16  /  ALT  10  /  AlkPhos  114  10-24        PT/INR - ( 24 Oct 2023 14:45 )   PT: 10.2 sec;   INR: 0.92 ratio         PTT - ( 24 Oct 2023 14:45 )  PTT:31.7 sec      RADIOLOGY:  CXR:  Clear    Nuclear STress Test:  * Normal nuclear stress test without evidence of ischemia or infarct.  * There are small, mild to moderate defects in basal anteroseptal, basal inferoseptal walls that are fixed  consistent with artifactfrom differential breast positioning.  * Normal LV size.  * Normal LV function.  * Post-stress resting myocardial perfusion gated SPECT  imaging was performed (LVEF = 60 %)  * The left ventricle was normal LV size.  * There are small, mild to moderate defects in basal anteroseptal, basal inferoseptal walls that are fixed consistent with artifact from differential breast  positioning.      Echo:   1. The left atrium is normal in size.   2. Normal wall motion. Left ventricular ejection fraction, by visual estimation, is 60 to 65%.   3. The right atrium is normal in size.   4. Normal right ventricular size and function.   5. Mild aortic valve stenosis.   6. Mild tricuspid regurgitation.   7. There is no evidence of pericardialeffusion.        Assessment:  62 y/o female PMHx HLD, tobacco, PAD s/p left sided CFA to AK pop bypass 7/2019 and recent right femoral endarterectomy p/w right groin dehiscence and persistent right calf wound planned for wound debridement.  Patient had evaluation with echo and nuclear stress test last month prior to endarterectomy.  Procedure was other wise tolerated well.  Does >4 METs without a problem.  Patient moderate risk, no CV contraindication to wound debridement.    Plan:  1. Proceed to OR.  2. Periop monitor.  3. Continue current CV meds at current doses as PTA.  4. Care per vascular.  5. OP f/u post d/c.    Will not actively follow.  Please call with additional questions.  Thanks!

## 2023-10-26 ENCOUNTER — TRANSCRIPTION ENCOUNTER (OUTPATIENT)
Age: 63
End: 2023-10-26

## 2023-10-26 LAB
ANION GAP SERPL CALC-SCNC: 14 MMOL/L — SIGNIFICANT CHANGE UP (ref 5–17)
ANION GAP SERPL CALC-SCNC: 14 MMOL/L — SIGNIFICANT CHANGE UP (ref 5–17)
BUN SERPL-MCNC: 13.4 MG/DL — SIGNIFICANT CHANGE UP (ref 8–20)
BUN SERPL-MCNC: 13.4 MG/DL — SIGNIFICANT CHANGE UP (ref 8–20)
CALCIUM SERPL-MCNC: 8.6 MG/DL — SIGNIFICANT CHANGE UP (ref 8.4–10.5)
CALCIUM SERPL-MCNC: 8.6 MG/DL — SIGNIFICANT CHANGE UP (ref 8.4–10.5)
CHLORIDE SERPL-SCNC: 105 MMOL/L — SIGNIFICANT CHANGE UP (ref 96–108)
CHLORIDE SERPL-SCNC: 105 MMOL/L — SIGNIFICANT CHANGE UP (ref 96–108)
CO2 SERPL-SCNC: 20 MMOL/L — LOW (ref 22–29)
CO2 SERPL-SCNC: 20 MMOL/L — LOW (ref 22–29)
CREAT SERPL-MCNC: 0.69 MG/DL — SIGNIFICANT CHANGE UP (ref 0.5–1.3)
CREAT SERPL-MCNC: 0.69 MG/DL — SIGNIFICANT CHANGE UP (ref 0.5–1.3)
EGFR: 97 ML/MIN/1.73M2 — SIGNIFICANT CHANGE UP
EGFR: 97 ML/MIN/1.73M2 — SIGNIFICANT CHANGE UP
GLUCOSE SERPL-MCNC: 132 MG/DL — HIGH (ref 70–99)
GLUCOSE SERPL-MCNC: 132 MG/DL — HIGH (ref 70–99)
HCT VFR BLD CALC: 33.9 % — LOW (ref 34.5–45)
HCT VFR BLD CALC: 33.9 % — LOW (ref 34.5–45)
HGB BLD-MCNC: 10.9 G/DL — LOW (ref 11.5–15.5)
HGB BLD-MCNC: 10.9 G/DL — LOW (ref 11.5–15.5)
MAGNESIUM SERPL-MCNC: 1.8 MG/DL — SIGNIFICANT CHANGE UP (ref 1.6–2.6)
MAGNESIUM SERPL-MCNC: 1.8 MG/DL — SIGNIFICANT CHANGE UP (ref 1.6–2.6)
MCHC RBC-ENTMCNC: 28.9 PG — SIGNIFICANT CHANGE UP (ref 27–34)
MCHC RBC-ENTMCNC: 28.9 PG — SIGNIFICANT CHANGE UP (ref 27–34)
MCHC RBC-ENTMCNC: 32.2 GM/DL — SIGNIFICANT CHANGE UP (ref 32–36)
MCHC RBC-ENTMCNC: 32.2 GM/DL — SIGNIFICANT CHANGE UP (ref 32–36)
MCV RBC AUTO: 89.9 FL — SIGNIFICANT CHANGE UP (ref 80–100)
MCV RBC AUTO: 89.9 FL — SIGNIFICANT CHANGE UP (ref 80–100)
PHOSPHATE SERPL-MCNC: 4.7 MG/DL — SIGNIFICANT CHANGE UP (ref 2.4–4.7)
PHOSPHATE SERPL-MCNC: 4.7 MG/DL — SIGNIFICANT CHANGE UP (ref 2.4–4.7)
PLATELET # BLD AUTO: 212 K/UL — SIGNIFICANT CHANGE UP (ref 150–400)
PLATELET # BLD AUTO: 212 K/UL — SIGNIFICANT CHANGE UP (ref 150–400)
POTASSIUM SERPL-MCNC: 4.9 MMOL/L — SIGNIFICANT CHANGE UP (ref 3.5–5.3)
POTASSIUM SERPL-MCNC: 4.9 MMOL/L — SIGNIFICANT CHANGE UP (ref 3.5–5.3)
POTASSIUM SERPL-SCNC: 4.9 MMOL/L — SIGNIFICANT CHANGE UP (ref 3.5–5.3)
POTASSIUM SERPL-SCNC: 4.9 MMOL/L — SIGNIFICANT CHANGE UP (ref 3.5–5.3)
RBC # BLD: 3.77 M/UL — LOW (ref 3.8–5.2)
RBC # BLD: 3.77 M/UL — LOW (ref 3.8–5.2)
RBC # FLD: 13.5 % — SIGNIFICANT CHANGE UP (ref 10.3–14.5)
RBC # FLD: 13.5 % — SIGNIFICANT CHANGE UP (ref 10.3–14.5)
SODIUM SERPL-SCNC: 139 MMOL/L — SIGNIFICANT CHANGE UP (ref 135–145)
SODIUM SERPL-SCNC: 139 MMOL/L — SIGNIFICANT CHANGE UP (ref 135–145)
WBC # BLD: 5.5 K/UL — SIGNIFICANT CHANGE UP (ref 3.8–10.5)
WBC # BLD: 5.5 K/UL — SIGNIFICANT CHANGE UP (ref 3.8–10.5)
WBC # FLD AUTO: 5.5 K/UL — SIGNIFICANT CHANGE UP (ref 3.8–10.5)
WBC # FLD AUTO: 5.5 K/UL — SIGNIFICANT CHANGE UP (ref 3.8–10.5)

## 2023-10-26 PROCEDURE — 99232 SBSQ HOSP IP/OBS MODERATE 35: CPT

## 2023-10-26 RX ADMIN — PIPERACILLIN AND TAZOBACTAM 25 GRAM(S): 4; .5 INJECTION, POWDER, LYOPHILIZED, FOR SOLUTION INTRAVENOUS at 13:16

## 2023-10-26 RX ADMIN — HYDROMORPHONE HYDROCHLORIDE 0.5 MILLIGRAM(S): 2 INJECTION INTRAMUSCULAR; INTRAVENOUS; SUBCUTANEOUS at 00:31

## 2023-10-26 RX ADMIN — OXYCODONE HYDROCHLORIDE 10 MILLIGRAM(S): 5 TABLET ORAL at 08:18

## 2023-10-26 RX ADMIN — PIPERACILLIN AND TAZOBACTAM 25 GRAM(S): 4; .5 INJECTION, POWDER, LYOPHILIZED, FOR SOLUTION INTRAVENOUS at 05:37

## 2023-10-26 RX ADMIN — OXYCODONE HYDROCHLORIDE 10 MILLIGRAM(S): 5 TABLET ORAL at 16:50

## 2023-10-26 RX ADMIN — HYDROMORPHONE HYDROCHLORIDE 0.5 MILLIGRAM(S): 2 INJECTION INTRAMUSCULAR; INTRAVENOUS; SUBCUTANEOUS at 21:10

## 2023-10-26 RX ADMIN — Medication 1 PATCH: at 13:17

## 2023-10-26 RX ADMIN — LISINOPRIL 2.5 MILLIGRAM(S): 2.5 TABLET ORAL at 05:38

## 2023-10-26 RX ADMIN — HYDROMORPHONE HYDROCHLORIDE 0.5 MILLIGRAM(S): 2 INJECTION INTRAMUSCULAR; INTRAVENOUS; SUBCUTANEOUS at 12:15

## 2023-10-26 RX ADMIN — OXYCODONE HYDROCHLORIDE 10 MILLIGRAM(S): 5 TABLET ORAL at 08:48

## 2023-10-26 RX ADMIN — HYDROMORPHONE HYDROCHLORIDE 0.5 MILLIGRAM(S): 2 INJECTION INTRAMUSCULAR; INTRAVENOUS; SUBCUTANEOUS at 12:45

## 2023-10-26 RX ADMIN — Medication 1 PATCH: at 10:48

## 2023-10-26 RX ADMIN — OXYCODONE HYDROCHLORIDE 10 MILLIGRAM(S): 5 TABLET ORAL at 16:20

## 2023-10-26 RX ADMIN — Medication 1 PATCH: at 08:12

## 2023-10-26 RX ADMIN — Medication 1 TABLET(S): at 13:15

## 2023-10-26 RX ADMIN — PIPERACILLIN AND TAZOBACTAM 25 GRAM(S): 4; .5 INJECTION, POWDER, LYOPHILIZED, FOR SOLUTION INTRAVENOUS at 21:11

## 2023-10-26 RX ADMIN — Medication 81 MILLIGRAM(S): at 13:16

## 2023-10-26 RX ADMIN — HYDROMORPHONE HYDROCHLORIDE 0.5 MILLIGRAM(S): 2 INJECTION INTRAMUSCULAR; INTRAVENOUS; SUBCUTANEOUS at 05:37

## 2023-10-26 RX ADMIN — ATORVASTATIN CALCIUM 20 MILLIGRAM(S): 80 TABLET, FILM COATED ORAL at 21:11

## 2023-10-27 LAB
ANION GAP SERPL CALC-SCNC: 11 MMOL/L — SIGNIFICANT CHANGE UP (ref 5–17)
ANION GAP SERPL CALC-SCNC: 11 MMOL/L — SIGNIFICANT CHANGE UP (ref 5–17)
BASOPHILS # BLD AUTO: 0.04 K/UL — SIGNIFICANT CHANGE UP (ref 0–0.2)
BASOPHILS # BLD AUTO: 0.04 K/UL — SIGNIFICANT CHANGE UP (ref 0–0.2)
BASOPHILS NFR BLD AUTO: 0.6 % — SIGNIFICANT CHANGE UP (ref 0–2)
BASOPHILS NFR BLD AUTO: 0.6 % — SIGNIFICANT CHANGE UP (ref 0–2)
BUN SERPL-MCNC: 20.6 MG/DL — HIGH (ref 8–20)
BUN SERPL-MCNC: 20.6 MG/DL — HIGH (ref 8–20)
CALCIUM SERPL-MCNC: 8.3 MG/DL — LOW (ref 8.4–10.5)
CALCIUM SERPL-MCNC: 8.3 MG/DL — LOW (ref 8.4–10.5)
CHLORIDE SERPL-SCNC: 106 MMOL/L — SIGNIFICANT CHANGE UP (ref 96–108)
CHLORIDE SERPL-SCNC: 106 MMOL/L — SIGNIFICANT CHANGE UP (ref 96–108)
CO2 SERPL-SCNC: 23 MMOL/L — SIGNIFICANT CHANGE UP (ref 22–29)
CO2 SERPL-SCNC: 23 MMOL/L — SIGNIFICANT CHANGE UP (ref 22–29)
CREAT SERPL-MCNC: 0.93 MG/DL — SIGNIFICANT CHANGE UP (ref 0.5–1.3)
CREAT SERPL-MCNC: 0.93 MG/DL — SIGNIFICANT CHANGE UP (ref 0.5–1.3)
EGFR: 69 ML/MIN/1.73M2 — SIGNIFICANT CHANGE UP
EGFR: 69 ML/MIN/1.73M2 — SIGNIFICANT CHANGE UP
EOSINOPHIL # BLD AUTO: 0.26 K/UL — SIGNIFICANT CHANGE UP (ref 0–0.5)
EOSINOPHIL # BLD AUTO: 0.26 K/UL — SIGNIFICANT CHANGE UP (ref 0–0.5)
EOSINOPHIL NFR BLD AUTO: 3.8 % — SIGNIFICANT CHANGE UP (ref 0–6)
EOSINOPHIL NFR BLD AUTO: 3.8 % — SIGNIFICANT CHANGE UP (ref 0–6)
GLUCOSE SERPL-MCNC: 99 MG/DL — SIGNIFICANT CHANGE UP (ref 70–99)
GLUCOSE SERPL-MCNC: 99 MG/DL — SIGNIFICANT CHANGE UP (ref 70–99)
HCT VFR BLD CALC: 28.9 % — LOW (ref 34.5–45)
HCT VFR BLD CALC: 28.9 % — LOW (ref 34.5–45)
HGB BLD-MCNC: 9.5 G/DL — LOW (ref 11.5–15.5)
HGB BLD-MCNC: 9.5 G/DL — LOW (ref 11.5–15.5)
IMM GRANULOCYTES NFR BLD AUTO: 0.4 % — SIGNIFICANT CHANGE UP (ref 0–0.9)
IMM GRANULOCYTES NFR BLD AUTO: 0.4 % — SIGNIFICANT CHANGE UP (ref 0–0.9)
LYMPHOCYTES # BLD AUTO: 2.12 K/UL — SIGNIFICANT CHANGE UP (ref 1–3.3)
LYMPHOCYTES # BLD AUTO: 2.12 K/UL — SIGNIFICANT CHANGE UP (ref 1–3.3)
LYMPHOCYTES # BLD AUTO: 30.6 % — SIGNIFICANT CHANGE UP (ref 13–44)
LYMPHOCYTES # BLD AUTO: 30.6 % — SIGNIFICANT CHANGE UP (ref 13–44)
MAGNESIUM SERPL-MCNC: 1.8 MG/DL — SIGNIFICANT CHANGE UP (ref 1.6–2.6)
MAGNESIUM SERPL-MCNC: 1.8 MG/DL — SIGNIFICANT CHANGE UP (ref 1.6–2.6)
MCHC RBC-ENTMCNC: 29.5 PG — SIGNIFICANT CHANGE UP (ref 27–34)
MCHC RBC-ENTMCNC: 29.5 PG — SIGNIFICANT CHANGE UP (ref 27–34)
MCHC RBC-ENTMCNC: 32.9 GM/DL — SIGNIFICANT CHANGE UP (ref 32–36)
MCHC RBC-ENTMCNC: 32.9 GM/DL — SIGNIFICANT CHANGE UP (ref 32–36)
MCV RBC AUTO: 89.8 FL — SIGNIFICANT CHANGE UP (ref 80–100)
MCV RBC AUTO: 89.8 FL — SIGNIFICANT CHANGE UP (ref 80–100)
MONOCYTES # BLD AUTO: 0.59 K/UL — SIGNIFICANT CHANGE UP (ref 0–0.9)
MONOCYTES # BLD AUTO: 0.59 K/UL — SIGNIFICANT CHANGE UP (ref 0–0.9)
MONOCYTES NFR BLD AUTO: 8.5 % — SIGNIFICANT CHANGE UP (ref 2–14)
MONOCYTES NFR BLD AUTO: 8.5 % — SIGNIFICANT CHANGE UP (ref 2–14)
NEUTROPHILS # BLD AUTO: 3.88 K/UL — SIGNIFICANT CHANGE UP (ref 1.8–7.4)
NEUTROPHILS # BLD AUTO: 3.88 K/UL — SIGNIFICANT CHANGE UP (ref 1.8–7.4)
NEUTROPHILS NFR BLD AUTO: 56.1 % — SIGNIFICANT CHANGE UP (ref 43–77)
NEUTROPHILS NFR BLD AUTO: 56.1 % — SIGNIFICANT CHANGE UP (ref 43–77)
PHOSPHATE SERPL-MCNC: 3.5 MG/DL — SIGNIFICANT CHANGE UP (ref 2.4–4.7)
PHOSPHATE SERPL-MCNC: 3.5 MG/DL — SIGNIFICANT CHANGE UP (ref 2.4–4.7)
PLATELET # BLD AUTO: 182 K/UL — SIGNIFICANT CHANGE UP (ref 150–400)
PLATELET # BLD AUTO: 182 K/UL — SIGNIFICANT CHANGE UP (ref 150–400)
POTASSIUM SERPL-MCNC: 4.1 MMOL/L — SIGNIFICANT CHANGE UP (ref 3.5–5.3)
POTASSIUM SERPL-MCNC: 4.1 MMOL/L — SIGNIFICANT CHANGE UP (ref 3.5–5.3)
POTASSIUM SERPL-SCNC: 4.1 MMOL/L — SIGNIFICANT CHANGE UP (ref 3.5–5.3)
POTASSIUM SERPL-SCNC: 4.1 MMOL/L — SIGNIFICANT CHANGE UP (ref 3.5–5.3)
RBC # BLD: 3.22 M/UL — LOW (ref 3.8–5.2)
RBC # BLD: 3.22 M/UL — LOW (ref 3.8–5.2)
RBC # FLD: 13.7 % — SIGNIFICANT CHANGE UP (ref 10.3–14.5)
RBC # FLD: 13.7 % — SIGNIFICANT CHANGE UP (ref 10.3–14.5)
SODIUM SERPL-SCNC: 140 MMOL/L — SIGNIFICANT CHANGE UP (ref 135–145)
SODIUM SERPL-SCNC: 140 MMOL/L — SIGNIFICANT CHANGE UP (ref 135–145)
WBC # BLD: 6.92 K/UL — SIGNIFICANT CHANGE UP (ref 3.8–10.5)
WBC # BLD: 6.92 K/UL — SIGNIFICANT CHANGE UP (ref 3.8–10.5)
WBC # FLD AUTO: 6.92 K/UL — SIGNIFICANT CHANGE UP (ref 3.8–10.5)
WBC # FLD AUTO: 6.92 K/UL — SIGNIFICANT CHANGE UP (ref 3.8–10.5)

## 2023-10-27 RX ORDER — HYDROMORPHONE HYDROCHLORIDE 2 MG/ML
0.5 INJECTION INTRAMUSCULAR; INTRAVENOUS; SUBCUTANEOUS ONCE
Refills: 0 | Status: DISCONTINUED | OUTPATIENT
Start: 2023-10-27 | End: 2023-10-27

## 2023-10-27 RX ORDER — NICOTINE POLACRILEX 2 MG
14 GUM BUCCAL
Qty: 30 | Refills: 0
Start: 2023-10-27 | End: 2023-11-25

## 2023-10-27 RX ADMIN — PIPERACILLIN AND TAZOBACTAM 25 GRAM(S): 4; .5 INJECTION, POWDER, LYOPHILIZED, FOR SOLUTION INTRAVENOUS at 21:18

## 2023-10-27 RX ADMIN — LISINOPRIL 2.5 MILLIGRAM(S): 2.5 TABLET ORAL at 05:20

## 2023-10-27 RX ADMIN — OXYCODONE HYDROCHLORIDE 10 MILLIGRAM(S): 5 TABLET ORAL at 10:08

## 2023-10-27 RX ADMIN — PIPERACILLIN AND TAZOBACTAM 25 GRAM(S): 4; .5 INJECTION, POWDER, LYOPHILIZED, FOR SOLUTION INTRAVENOUS at 13:01

## 2023-10-27 RX ADMIN — HYDROMORPHONE HYDROCHLORIDE 0.5 MILLIGRAM(S): 2 INJECTION INTRAMUSCULAR; INTRAVENOUS; SUBCUTANEOUS at 06:58

## 2023-10-27 RX ADMIN — HYDROMORPHONE HYDROCHLORIDE 0.5 MILLIGRAM(S): 2 INJECTION INTRAMUSCULAR; INTRAVENOUS; SUBCUTANEOUS at 16:22

## 2023-10-27 RX ADMIN — HYDROMORPHONE HYDROCHLORIDE 0.5 MILLIGRAM(S): 2 INJECTION INTRAMUSCULAR; INTRAVENOUS; SUBCUTANEOUS at 17:10

## 2023-10-27 RX ADMIN — Medication 1 PATCH: at 16:22

## 2023-10-27 RX ADMIN — HYDROMORPHONE HYDROCHLORIDE 0.5 MILLIGRAM(S): 2 INJECTION INTRAMUSCULAR; INTRAVENOUS; SUBCUTANEOUS at 17:27

## 2023-10-27 RX ADMIN — Medication 1 PATCH: at 07:00

## 2023-10-27 RX ADMIN — HYDROMORPHONE HYDROCHLORIDE 0.5 MILLIGRAM(S): 2 INJECTION INTRAMUSCULAR; INTRAVENOUS; SUBCUTANEOUS at 21:18

## 2023-10-27 RX ADMIN — HYDROMORPHONE HYDROCHLORIDE 0.5 MILLIGRAM(S): 2 INJECTION INTRAMUSCULAR; INTRAVENOUS; SUBCUTANEOUS at 04:09

## 2023-10-27 RX ADMIN — ATORVASTATIN CALCIUM 20 MILLIGRAM(S): 80 TABLET, FILM COATED ORAL at 21:18

## 2023-10-27 RX ADMIN — Medication 1 PATCH: at 04:09

## 2023-10-27 RX ADMIN — Medication 1 TABLET(S): at 13:01

## 2023-10-27 RX ADMIN — HYDROMORPHONE HYDROCHLORIDE 0.5 MILLIGRAM(S): 2 INJECTION INTRAMUSCULAR; INTRAVENOUS; SUBCUTANEOUS at 16:10

## 2023-10-27 RX ADMIN — Medication 81 MILLIGRAM(S): at 13:00

## 2023-10-27 RX ADMIN — Medication 1 PATCH: at 12:59

## 2023-10-27 RX ADMIN — OXYCODONE HYDROCHLORIDE 10 MILLIGRAM(S): 5 TABLET ORAL at 09:38

## 2023-10-27 RX ADMIN — PIPERACILLIN AND TAZOBACTAM 25 GRAM(S): 4; .5 INJECTION, POWDER, LYOPHILIZED, FOR SOLUTION INTRAVENOUS at 05:20

## 2023-10-27 NOTE — DISCHARGE NOTE PROVIDER - NSDCFUADDINST_GEN_ALL_CORE_FT
Please make an appointment with Dr. Blair to see him in the office in one week.   Please take your antibiotics as prescribed.   You will have visiting nurse services to take care of your wound vac.   You may alternate tylenol and advil for pain  Please make an appointment with Dr. Blair to see him in the office in one week.   Please take your antibiotics as prescribed.   You will have visiting nurse services to take care of your wound vac.   You may take over-the-counter pain medication for your pain. If it is very severe you may take your oxycodone as prescribed.   If wound vac fails, you may clean with saline and apply a damp non-sterile dressing to the area and change 2x per day.

## 2023-10-27 NOTE — DISCHARGE NOTE PROVIDER - NSDCHHNEEDSERVICEOTHER_GEN_ALL_CORE_FT
Wound vac changes. Every 72 hours the patient will require a vac change with a 2x2 cm piece of black sponge in the wound. Please set the suction to 125 mmhg.

## 2023-10-27 NOTE — DISCHARGE NOTE PROVIDER - NSDCMRMEDTOKEN_GEN_ALL_CORE_FT
aspirin 81 mg oral tablet: 1 tab(s) orally once a day  clopidogrel 75 mg oral tablet: 1 tab(s) orally once a day   mg oral tablet: 1 tab(s) orally every 6 hours  Lipitor 20 mg oral tablet: 1 tab(s) orally once a day  lisinopril 2.5 mg oral tablet: 1 tab(s) orally once a day   aspirin 81 mg oral tablet: 1 tab(s) orally once a day  clopidogrel 75 mg oral tablet: 1 tab(s) orally once a day   mg oral tablet: 1 tab(s) orally every 6 hours  levoFLOXacin 750 mg oral tablet: 1 tab(s) orally once a day  Lipitor 20 mg oral tablet: 1 tab(s) orally once a day  lisinopril 2.5 mg oral tablet: 1 tab(s) orally once a day   aspirin 81 mg oral tablet: 1 tab(s) orally once a day  clopidogrel 75 mg oral tablet: 1 tab(s) orally once a day   mg oral tablet: 1 tab(s) orally every 6 hours  levoFLOXacin 750 mg oral tablet: 1 tab(s) orally once a day  Lipitor 20 mg oral tablet: 1 tab(s) orally once a day  lisinopril 2.5 mg oral tablet: 1 tab(s) orally once a day  oxyCODONE 5 mg oral capsule: 1 cap(s) orally every 6 hours as needed for  severe pain MDD: 4

## 2023-10-27 NOTE — DISCHARGE NOTE PROVIDER - HOSPITAL COURSE
62yo F w/ hx of femoral endartarectomy and SFA stent placement presented w/ L groin wound dehiscence. Pt presented to Dr. Blair's office for routine postoperative visit and was found to have dehiscence of wound. No bleeding or gross purulence at that time. She was sent to [facility] for further management. On admission pt was started on IV antibiotics. On 10/25 she went to the OR for washout and wound vac placement. She was admitted to the floor postoperatively. She continued on IV zosyn. OR cultures positive for pseudomonas. ID recommended pt be discharged on oral levofloxacin 750mg daily through 11/8. She was discharged home in stable condition with visiting nurse services for wound vac management.

## 2023-10-27 NOTE — DISCHARGE NOTE PROVIDER - NSDCFUADDAPPT_GEN_ALL_CORE_FT
Please follow-up with Dr. Blair as well as your PCP in the next two weeks  Please follow-up with Dr. Blair in the office in 1 week. Please also follow up with your primary care doctor.

## 2023-10-27 NOTE — DISCHARGE NOTE PROVIDER - CARE PROVIDER_API CALL
Shaheen Blair  Vascular Surgery  [address] [place] [address], [address]  [place] [address]  Phone: [phone]  Fax: [phone]  Follow Up Time:    Shaheen Blair  Vascular Surgery  [address] [facility] [facility], [address]  [place] [address]  Phone: [phone]  Fax: [phone]  Follow Up Time: 1 week

## 2023-10-27 NOTE — DISCHARGE NOTE PROVIDER - NSDCCPCAREPLAN_GEN_ALL_CORE_FT
PRINCIPAL DISCHARGE DIAGNOSIS  Diagnosis: Wound dehiscence  Assessment and Plan of Treatment: Follow Up: Please call to make an appointment 10-14 days after discharge. Also, please call to make an appointment with your primary care physician as per your usual schedule.   Activity: May return to normal activities as tolerated, however refrain from heavy lifting >10-15 pounds.   Diet: May continue regular diet.  Medications: Please take all home medications as prescribed by your primary care doctor. You may take tylenol and ibuprofen as needed for pain.   Wound Care: Please, keep wound site clean and dry. You may shower, but do not bathe.   Patient is advised to RETURN TO THE EMERGENCY DEPARTMENT for any of the following - worsening pain, fever/chills, nausea/vomiting, alterned mental status, chest pain, shortness of breath, or any other new/worsening symptoms.

## 2023-10-27 NOTE — DISCHARGE NOTE PROVIDER - NSDCCPTREATMENT_GEN_ALL_CORE_FT
PRINCIPAL PROCEDURE  Procedure: Washout, wound, inguinal region  Findings and Treatment: Your previously made groin incision had come open and we explored and washed it out to make sure it was clean and healing appropriately.

## 2023-10-28 LAB
-  AMIKACIN: SIGNIFICANT CHANGE UP
-  AMIKACIN: SIGNIFICANT CHANGE UP
-  AZTREONAM: SIGNIFICANT CHANGE UP
-  AZTREONAM: SIGNIFICANT CHANGE UP
-  CEFEPIME: SIGNIFICANT CHANGE UP
-  CEFEPIME: SIGNIFICANT CHANGE UP
-  CEFTAZIDIME: SIGNIFICANT CHANGE UP
-  CEFTAZIDIME: SIGNIFICANT CHANGE UP
-  CIPROFLOXACIN: SIGNIFICANT CHANGE UP
-  CIPROFLOXACIN: SIGNIFICANT CHANGE UP
-  GENTAMICIN: SIGNIFICANT CHANGE UP
-  GENTAMICIN: SIGNIFICANT CHANGE UP
-  IMIPENEM: SIGNIFICANT CHANGE UP
-  IMIPENEM: SIGNIFICANT CHANGE UP
-  LEVOFLOXACIN: SIGNIFICANT CHANGE UP
-  LEVOFLOXACIN: SIGNIFICANT CHANGE UP
-  MEROPENEM: SIGNIFICANT CHANGE UP
-  MEROPENEM: SIGNIFICANT CHANGE UP
-  PIPERACILLIN/TAZOBACTAM: SIGNIFICANT CHANGE UP
-  PIPERACILLIN/TAZOBACTAM: SIGNIFICANT CHANGE UP
-  TOBRAMYCIN: SIGNIFICANT CHANGE UP
-  TOBRAMYCIN: SIGNIFICANT CHANGE UP
ANION GAP SERPL CALC-SCNC: 11 MMOL/L — SIGNIFICANT CHANGE UP (ref 5–17)
ANION GAP SERPL CALC-SCNC: 11 MMOL/L — SIGNIFICANT CHANGE UP (ref 5–17)
BUN SERPL-MCNC: 19 MG/DL — SIGNIFICANT CHANGE UP (ref 8–20)
BUN SERPL-MCNC: 19 MG/DL — SIGNIFICANT CHANGE UP (ref 8–20)
CALCIUM SERPL-MCNC: 8.6 MG/DL — SIGNIFICANT CHANGE UP (ref 8.4–10.5)
CALCIUM SERPL-MCNC: 8.6 MG/DL — SIGNIFICANT CHANGE UP (ref 8.4–10.5)
CHLORIDE SERPL-SCNC: 106 MMOL/L — SIGNIFICANT CHANGE UP (ref 96–108)
CHLORIDE SERPL-SCNC: 106 MMOL/L — SIGNIFICANT CHANGE UP (ref 96–108)
CO2 SERPL-SCNC: 25 MMOL/L — SIGNIFICANT CHANGE UP (ref 22–29)
CO2 SERPL-SCNC: 25 MMOL/L — SIGNIFICANT CHANGE UP (ref 22–29)
CREAT SERPL-MCNC: 0.94 MG/DL — SIGNIFICANT CHANGE UP (ref 0.5–1.3)
CREAT SERPL-MCNC: 0.94 MG/DL — SIGNIFICANT CHANGE UP (ref 0.5–1.3)
EGFR: 68 ML/MIN/1.73M2 — SIGNIFICANT CHANGE UP
EGFR: 68 ML/MIN/1.73M2 — SIGNIFICANT CHANGE UP
GLUCOSE SERPL-MCNC: 113 MG/DL — HIGH (ref 70–99)
GLUCOSE SERPL-MCNC: 113 MG/DL — HIGH (ref 70–99)
HCT VFR BLD CALC: 28.2 % — LOW (ref 34.5–45)
HCT VFR BLD CALC: 28.2 % — LOW (ref 34.5–45)
HGB BLD-MCNC: 9.3 G/DL — LOW (ref 11.5–15.5)
HGB BLD-MCNC: 9.3 G/DL — LOW (ref 11.5–15.5)
MAGNESIUM SERPL-MCNC: 1.7 MG/DL — LOW (ref 1.8–2.6)
MAGNESIUM SERPL-MCNC: 1.7 MG/DL — LOW (ref 1.8–2.6)
MCHC RBC-ENTMCNC: 30 PG — SIGNIFICANT CHANGE UP (ref 27–34)
MCHC RBC-ENTMCNC: 30 PG — SIGNIFICANT CHANGE UP (ref 27–34)
MCHC RBC-ENTMCNC: 33 GM/DL — SIGNIFICANT CHANGE UP (ref 32–36)
MCHC RBC-ENTMCNC: 33 GM/DL — SIGNIFICANT CHANGE UP (ref 32–36)
MCV RBC AUTO: 91 FL — SIGNIFICANT CHANGE UP (ref 80–100)
MCV RBC AUTO: 91 FL — SIGNIFICANT CHANGE UP (ref 80–100)
METHOD TYPE: SIGNIFICANT CHANGE UP
METHOD TYPE: SIGNIFICANT CHANGE UP
PHOSPHATE SERPL-MCNC: 4.5 MG/DL — SIGNIFICANT CHANGE UP (ref 2.4–4.7)
PHOSPHATE SERPL-MCNC: 4.5 MG/DL — SIGNIFICANT CHANGE UP (ref 2.4–4.7)
PLATELET # BLD AUTO: 165 K/UL — SIGNIFICANT CHANGE UP (ref 150–400)
PLATELET # BLD AUTO: 165 K/UL — SIGNIFICANT CHANGE UP (ref 150–400)
POTASSIUM SERPL-MCNC: 4.5 MMOL/L — SIGNIFICANT CHANGE UP (ref 3.5–5.3)
POTASSIUM SERPL-MCNC: 4.5 MMOL/L — SIGNIFICANT CHANGE UP (ref 3.5–5.3)
POTASSIUM SERPL-SCNC: 4.5 MMOL/L — SIGNIFICANT CHANGE UP (ref 3.5–5.3)
POTASSIUM SERPL-SCNC: 4.5 MMOL/L — SIGNIFICANT CHANGE UP (ref 3.5–5.3)
RBC # BLD: 3.1 M/UL — LOW (ref 3.8–5.2)
RBC # BLD: 3.1 M/UL — LOW (ref 3.8–5.2)
RBC # FLD: 13.6 % — SIGNIFICANT CHANGE UP (ref 10.3–14.5)
RBC # FLD: 13.6 % — SIGNIFICANT CHANGE UP (ref 10.3–14.5)
SODIUM SERPL-SCNC: 142 MMOL/L — SIGNIFICANT CHANGE UP (ref 135–145)
SODIUM SERPL-SCNC: 142 MMOL/L — SIGNIFICANT CHANGE UP (ref 135–145)
WBC # BLD: 6.48 K/UL — SIGNIFICANT CHANGE UP (ref 3.8–10.5)
WBC # BLD: 6.48 K/UL — SIGNIFICANT CHANGE UP (ref 3.8–10.5)
WBC # FLD AUTO: 6.48 K/UL — SIGNIFICANT CHANGE UP (ref 3.8–10.5)
WBC # FLD AUTO: 6.48 K/UL — SIGNIFICANT CHANGE UP (ref 3.8–10.5)

## 2023-10-28 RX ORDER — MAGNESIUM SULFATE 500 MG/ML
1 VIAL (ML) INJECTION ONCE
Refills: 0 | Status: COMPLETED | OUTPATIENT
Start: 2023-10-28 | End: 2023-10-28

## 2023-10-28 RX ADMIN — HYDROMORPHONE HYDROCHLORIDE 0.5 MILLIGRAM(S): 2 INJECTION INTRAMUSCULAR; INTRAVENOUS; SUBCUTANEOUS at 01:34

## 2023-10-28 RX ADMIN — Medication 81 MILLIGRAM(S): at 12:10

## 2023-10-28 RX ADMIN — PIPERACILLIN AND TAZOBACTAM 25 GRAM(S): 4; .5 INJECTION, POWDER, LYOPHILIZED, FOR SOLUTION INTRAVENOUS at 14:39

## 2023-10-28 RX ADMIN — CLOPIDOGREL BISULFATE 75 MILLIGRAM(S): 75 TABLET, FILM COATED ORAL at 12:18

## 2023-10-28 RX ADMIN — Medication 1 PATCH: at 12:16

## 2023-10-28 RX ADMIN — ATORVASTATIN CALCIUM 20 MILLIGRAM(S): 80 TABLET, FILM COATED ORAL at 21:31

## 2023-10-28 RX ADMIN — Medication 100 GRAM(S): at 18:34

## 2023-10-28 RX ADMIN — PIPERACILLIN AND TAZOBACTAM 25 GRAM(S): 4; .5 INJECTION, POWDER, LYOPHILIZED, FOR SOLUTION INTRAVENOUS at 21:31

## 2023-10-28 RX ADMIN — Medication 1 PATCH: at 22:16

## 2023-10-28 RX ADMIN — LISINOPRIL 2.5 MILLIGRAM(S): 2.5 TABLET ORAL at 05:02

## 2023-10-28 RX ADMIN — OXYCODONE HYDROCHLORIDE 10 MILLIGRAM(S): 5 TABLET ORAL at 08:42

## 2023-10-28 RX ADMIN — HYDROMORPHONE HYDROCHLORIDE 0.5 MILLIGRAM(S): 2 INJECTION INTRAMUSCULAR; INTRAVENOUS; SUBCUTANEOUS at 22:21

## 2023-10-28 RX ADMIN — Medication 1 TABLET(S): at 12:13

## 2023-10-28 RX ADMIN — PIPERACILLIN AND TAZOBACTAM 25 GRAM(S): 4; .5 INJECTION, POWDER, LYOPHILIZED, FOR SOLUTION INTRAVENOUS at 05:03

## 2023-10-28 RX ADMIN — OXYCODONE HYDROCHLORIDE 10 MILLIGRAM(S): 5 TABLET ORAL at 14:39

## 2023-10-29 LAB
ANION GAP SERPL CALC-SCNC: 14 MMOL/L — SIGNIFICANT CHANGE UP (ref 5–17)
ANION GAP SERPL CALC-SCNC: 14 MMOL/L — SIGNIFICANT CHANGE UP (ref 5–17)
BASOPHILS # BLD AUTO: 0.03 K/UL — SIGNIFICANT CHANGE UP (ref 0–0.2)
BASOPHILS # BLD AUTO: 0.03 K/UL — SIGNIFICANT CHANGE UP (ref 0–0.2)
BASOPHILS NFR BLD AUTO: 0.5 % — SIGNIFICANT CHANGE UP (ref 0–2)
BASOPHILS NFR BLD AUTO: 0.5 % — SIGNIFICANT CHANGE UP (ref 0–2)
BUN SERPL-MCNC: 12.6 MG/DL — SIGNIFICANT CHANGE UP (ref 8–20)
BUN SERPL-MCNC: 12.6 MG/DL — SIGNIFICANT CHANGE UP (ref 8–20)
CALCIUM SERPL-MCNC: 9 MG/DL — SIGNIFICANT CHANGE UP (ref 8.4–10.5)
CALCIUM SERPL-MCNC: 9 MG/DL — SIGNIFICANT CHANGE UP (ref 8.4–10.5)
CHLORIDE SERPL-SCNC: 103 MMOL/L — SIGNIFICANT CHANGE UP (ref 96–108)
CHLORIDE SERPL-SCNC: 103 MMOL/L — SIGNIFICANT CHANGE UP (ref 96–108)
CO2 SERPL-SCNC: 25 MMOL/L — SIGNIFICANT CHANGE UP (ref 22–29)
CO2 SERPL-SCNC: 25 MMOL/L — SIGNIFICANT CHANGE UP (ref 22–29)
CREAT SERPL-MCNC: 0.73 MG/DL — SIGNIFICANT CHANGE UP (ref 0.5–1.3)
CREAT SERPL-MCNC: 0.73 MG/DL — SIGNIFICANT CHANGE UP (ref 0.5–1.3)
EGFR: 92 ML/MIN/1.73M2 — SIGNIFICANT CHANGE UP
EGFR: 92 ML/MIN/1.73M2 — SIGNIFICANT CHANGE UP
EOSINOPHIL # BLD AUTO: 0.61 K/UL — HIGH (ref 0–0.5)
EOSINOPHIL # BLD AUTO: 0.61 K/UL — HIGH (ref 0–0.5)
EOSINOPHIL NFR BLD AUTO: 10.4 % — HIGH (ref 0–6)
EOSINOPHIL NFR BLD AUTO: 10.4 % — HIGH (ref 0–6)
GLUCOSE SERPL-MCNC: 114 MG/DL — HIGH (ref 70–99)
GLUCOSE SERPL-MCNC: 114 MG/DL — HIGH (ref 70–99)
HCT VFR BLD CALC: 32.4 % — LOW (ref 34.5–45)
HCT VFR BLD CALC: 32.4 % — LOW (ref 34.5–45)
HGB BLD-MCNC: 10.4 G/DL — LOW (ref 11.5–15.5)
HGB BLD-MCNC: 10.4 G/DL — LOW (ref 11.5–15.5)
IMM GRANULOCYTES NFR BLD AUTO: 0.3 % — SIGNIFICANT CHANGE UP (ref 0–0.9)
IMM GRANULOCYTES NFR BLD AUTO: 0.3 % — SIGNIFICANT CHANGE UP (ref 0–0.9)
LYMPHOCYTES # BLD AUTO: 1.68 K/UL — SIGNIFICANT CHANGE UP (ref 1–3.3)
LYMPHOCYTES # BLD AUTO: 1.68 K/UL — SIGNIFICANT CHANGE UP (ref 1–3.3)
LYMPHOCYTES # BLD AUTO: 28.7 % — SIGNIFICANT CHANGE UP (ref 13–44)
LYMPHOCYTES # BLD AUTO: 28.7 % — SIGNIFICANT CHANGE UP (ref 13–44)
MAGNESIUM SERPL-MCNC: 1.9 MG/DL — SIGNIFICANT CHANGE UP (ref 1.6–2.6)
MAGNESIUM SERPL-MCNC: 1.9 MG/DL — SIGNIFICANT CHANGE UP (ref 1.6–2.6)
MCHC RBC-ENTMCNC: 28.8 PG — SIGNIFICANT CHANGE UP (ref 27–34)
MCHC RBC-ENTMCNC: 28.8 PG — SIGNIFICANT CHANGE UP (ref 27–34)
MCHC RBC-ENTMCNC: 32.1 GM/DL — SIGNIFICANT CHANGE UP (ref 32–36)
MCHC RBC-ENTMCNC: 32.1 GM/DL — SIGNIFICANT CHANGE UP (ref 32–36)
MCV RBC AUTO: 89.8 FL — SIGNIFICANT CHANGE UP (ref 80–100)
MCV RBC AUTO: 89.8 FL — SIGNIFICANT CHANGE UP (ref 80–100)
MONOCYTES # BLD AUTO: 0.52 K/UL — SIGNIFICANT CHANGE UP (ref 0–0.9)
MONOCYTES # BLD AUTO: 0.52 K/UL — SIGNIFICANT CHANGE UP (ref 0–0.9)
MONOCYTES NFR BLD AUTO: 8.9 % — SIGNIFICANT CHANGE UP (ref 2–14)
MONOCYTES NFR BLD AUTO: 8.9 % — SIGNIFICANT CHANGE UP (ref 2–14)
NEUTROPHILS # BLD AUTO: 3 K/UL — SIGNIFICANT CHANGE UP (ref 1.8–7.4)
NEUTROPHILS # BLD AUTO: 3 K/UL — SIGNIFICANT CHANGE UP (ref 1.8–7.4)
NEUTROPHILS NFR BLD AUTO: 51.2 % — SIGNIFICANT CHANGE UP (ref 43–77)
NEUTROPHILS NFR BLD AUTO: 51.2 % — SIGNIFICANT CHANGE UP (ref 43–77)
PHOSPHATE SERPL-MCNC: 3.9 MG/DL — SIGNIFICANT CHANGE UP (ref 2.4–4.7)
PHOSPHATE SERPL-MCNC: 3.9 MG/DL — SIGNIFICANT CHANGE UP (ref 2.4–4.7)
PLATELET # BLD AUTO: 197 K/UL — SIGNIFICANT CHANGE UP (ref 150–400)
PLATELET # BLD AUTO: 197 K/UL — SIGNIFICANT CHANGE UP (ref 150–400)
POTASSIUM SERPL-MCNC: 4.4 MMOL/L — SIGNIFICANT CHANGE UP (ref 3.5–5.3)
POTASSIUM SERPL-MCNC: 4.4 MMOL/L — SIGNIFICANT CHANGE UP (ref 3.5–5.3)
POTASSIUM SERPL-SCNC: 4.4 MMOL/L — SIGNIFICANT CHANGE UP (ref 3.5–5.3)
POTASSIUM SERPL-SCNC: 4.4 MMOL/L — SIGNIFICANT CHANGE UP (ref 3.5–5.3)
RBC # BLD: 3.61 M/UL — LOW (ref 3.8–5.2)
RBC # BLD: 3.61 M/UL — LOW (ref 3.8–5.2)
RBC # FLD: 13.2 % — SIGNIFICANT CHANGE UP (ref 10.3–14.5)
RBC # FLD: 13.2 % — SIGNIFICANT CHANGE UP (ref 10.3–14.5)
SODIUM SERPL-SCNC: 142 MMOL/L — SIGNIFICANT CHANGE UP (ref 135–145)
SODIUM SERPL-SCNC: 142 MMOL/L — SIGNIFICANT CHANGE UP (ref 135–145)
WBC # BLD: 5.86 K/UL — SIGNIFICANT CHANGE UP (ref 3.8–10.5)
WBC # BLD: 5.86 K/UL — SIGNIFICANT CHANGE UP (ref 3.8–10.5)
WBC # FLD AUTO: 5.86 K/UL — SIGNIFICANT CHANGE UP (ref 3.8–10.5)
WBC # FLD AUTO: 5.86 K/UL — SIGNIFICANT CHANGE UP (ref 3.8–10.5)

## 2023-10-29 PROCEDURE — 99232 SBSQ HOSP IP/OBS MODERATE 35: CPT

## 2023-10-29 RX ADMIN — Medication 1 PATCH: at 08:07

## 2023-10-29 RX ADMIN — Medication 1 TABLET(S): at 16:07

## 2023-10-29 RX ADMIN — OXYCODONE HYDROCHLORIDE 10 MILLIGRAM(S): 5 TABLET ORAL at 20:46

## 2023-10-29 RX ADMIN — Medication 1 PATCH: at 12:29

## 2023-10-29 RX ADMIN — OXYCODONE HYDROCHLORIDE 10 MILLIGRAM(S): 5 TABLET ORAL at 19:26

## 2023-10-29 RX ADMIN — ATORVASTATIN CALCIUM 20 MILLIGRAM(S): 80 TABLET, FILM COATED ORAL at 21:31

## 2023-10-29 RX ADMIN — PIPERACILLIN AND TAZOBACTAM 25 GRAM(S): 4; .5 INJECTION, POWDER, LYOPHILIZED, FOR SOLUTION INTRAVENOUS at 05:03

## 2023-10-29 RX ADMIN — LISINOPRIL 2.5 MILLIGRAM(S): 2.5 TABLET ORAL at 05:03

## 2023-10-29 RX ADMIN — PIPERACILLIN AND TAZOBACTAM 25 GRAM(S): 4; .5 INJECTION, POWDER, LYOPHILIZED, FOR SOLUTION INTRAVENOUS at 16:07

## 2023-10-29 RX ADMIN — Medication 81 MILLIGRAM(S): at 12:29

## 2023-10-29 RX ADMIN — OXYCODONE HYDROCHLORIDE 10 MILLIGRAM(S): 5 TABLET ORAL at 14:51

## 2023-10-29 RX ADMIN — Medication 1 PATCH: at 19:06

## 2023-10-29 RX ADMIN — HYDROMORPHONE HYDROCHLORIDE 0.5 MILLIGRAM(S): 2 INJECTION INTRAMUSCULAR; INTRAVENOUS; SUBCUTANEOUS at 00:10

## 2023-10-29 RX ADMIN — CLOPIDOGREL BISULFATE 75 MILLIGRAM(S): 75 TABLET, FILM COATED ORAL at 12:29

## 2023-10-30 ENCOUNTER — TRANSCRIPTION ENCOUNTER (OUTPATIENT)
Age: 63
End: 2023-10-30

## 2023-10-30 VITALS
SYSTOLIC BLOOD PRESSURE: 146 MMHG | HEART RATE: 93 BPM | TEMPERATURE: 99 F | DIASTOLIC BLOOD PRESSURE: 76 MMHG | RESPIRATION RATE: 18 BRPM | OXYGEN SATURATION: 92 %

## 2023-10-30 LAB
ANION GAP SERPL CALC-SCNC: 11 MMOL/L — SIGNIFICANT CHANGE UP (ref 5–17)
ANION GAP SERPL CALC-SCNC: 11 MMOL/L — SIGNIFICANT CHANGE UP (ref 5–17)
BUN SERPL-MCNC: 15.9 MG/DL — SIGNIFICANT CHANGE UP (ref 8–20)
BUN SERPL-MCNC: 15.9 MG/DL — SIGNIFICANT CHANGE UP (ref 8–20)
CALCIUM SERPL-MCNC: 8.9 MG/DL — SIGNIFICANT CHANGE UP (ref 8.4–10.5)
CALCIUM SERPL-MCNC: 8.9 MG/DL — SIGNIFICANT CHANGE UP (ref 8.4–10.5)
CHLORIDE SERPL-SCNC: 105 MMOL/L — SIGNIFICANT CHANGE UP (ref 96–108)
CHLORIDE SERPL-SCNC: 105 MMOL/L — SIGNIFICANT CHANGE UP (ref 96–108)
CO2 SERPL-SCNC: 25 MMOL/L — SIGNIFICANT CHANGE UP (ref 22–29)
CO2 SERPL-SCNC: 25 MMOL/L — SIGNIFICANT CHANGE UP (ref 22–29)
CREAT SERPL-MCNC: 0.85 MG/DL — SIGNIFICANT CHANGE UP (ref 0.5–1.3)
CREAT SERPL-MCNC: 0.85 MG/DL — SIGNIFICANT CHANGE UP (ref 0.5–1.3)
EGFR: 77 ML/MIN/1.73M2 — SIGNIFICANT CHANGE UP
EGFR: 77 ML/MIN/1.73M2 — SIGNIFICANT CHANGE UP
GLUCOSE SERPL-MCNC: 105 MG/DL — HIGH (ref 70–99)
GLUCOSE SERPL-MCNC: 105 MG/DL — HIGH (ref 70–99)
HCT VFR BLD CALC: 31.8 % — LOW (ref 34.5–45)
HCT VFR BLD CALC: 31.8 % — LOW (ref 34.5–45)
HGB BLD-MCNC: 10.1 G/DL — LOW (ref 11.5–15.5)
HGB BLD-MCNC: 10.1 G/DL — LOW (ref 11.5–15.5)
MAGNESIUM SERPL-MCNC: 1.8 MG/DL — SIGNIFICANT CHANGE UP (ref 1.6–2.6)
MAGNESIUM SERPL-MCNC: 1.8 MG/DL — SIGNIFICANT CHANGE UP (ref 1.6–2.6)
MCHC RBC-ENTMCNC: 28.9 PG — SIGNIFICANT CHANGE UP (ref 27–34)
MCHC RBC-ENTMCNC: 28.9 PG — SIGNIFICANT CHANGE UP (ref 27–34)
MCHC RBC-ENTMCNC: 31.8 GM/DL — LOW (ref 32–36)
MCHC RBC-ENTMCNC: 31.8 GM/DL — LOW (ref 32–36)
MCV RBC AUTO: 91.1 FL — SIGNIFICANT CHANGE UP (ref 80–100)
MCV RBC AUTO: 91.1 FL — SIGNIFICANT CHANGE UP (ref 80–100)
PHOSPHATE SERPL-MCNC: 4.3 MG/DL — SIGNIFICANT CHANGE UP (ref 2.4–4.7)
PHOSPHATE SERPL-MCNC: 4.3 MG/DL — SIGNIFICANT CHANGE UP (ref 2.4–4.7)
PLATELET # BLD AUTO: 199 K/UL — SIGNIFICANT CHANGE UP (ref 150–400)
PLATELET # BLD AUTO: 199 K/UL — SIGNIFICANT CHANGE UP (ref 150–400)
POTASSIUM SERPL-MCNC: 4.4 MMOL/L — SIGNIFICANT CHANGE UP (ref 3.5–5.3)
POTASSIUM SERPL-MCNC: 4.4 MMOL/L — SIGNIFICANT CHANGE UP (ref 3.5–5.3)
POTASSIUM SERPL-SCNC: 4.4 MMOL/L — SIGNIFICANT CHANGE UP (ref 3.5–5.3)
POTASSIUM SERPL-SCNC: 4.4 MMOL/L — SIGNIFICANT CHANGE UP (ref 3.5–5.3)
RBC # BLD: 3.49 M/UL — LOW (ref 3.8–5.2)
RBC # BLD: 3.49 M/UL — LOW (ref 3.8–5.2)
RBC # FLD: 13.2 % — SIGNIFICANT CHANGE UP (ref 10.3–14.5)
RBC # FLD: 13.2 % — SIGNIFICANT CHANGE UP (ref 10.3–14.5)
SODIUM SERPL-SCNC: 141 MMOL/L — SIGNIFICANT CHANGE UP (ref 135–145)
SODIUM SERPL-SCNC: 141 MMOL/L — SIGNIFICANT CHANGE UP (ref 135–145)
WBC # BLD: 6.16 K/UL — SIGNIFICANT CHANGE UP (ref 3.8–10.5)
WBC # BLD: 6.16 K/UL — SIGNIFICANT CHANGE UP (ref 3.8–10.5)
WBC # FLD AUTO: 6.16 K/UL — SIGNIFICANT CHANGE UP (ref 3.8–10.5)
WBC # FLD AUTO: 6.16 K/UL — SIGNIFICANT CHANGE UP (ref 3.8–10.5)

## 2023-10-30 PROCEDURE — 85025 COMPLETE CBC W/AUTO DIFF WBC: CPT

## 2023-10-30 PROCEDURE — 87186 SC STD MICRODIL/AGAR DIL: CPT

## 2023-10-30 PROCEDURE — 80048 BASIC METABOLIC PNL TOTAL CA: CPT

## 2023-10-30 PROCEDURE — 86922 COMPATIBILITY TEST ANTIGLOB: CPT

## 2023-10-30 PROCEDURE — 93005 ELECTROCARDIOGRAM TRACING: CPT

## 2023-10-30 PROCEDURE — 85730 THROMBOPLASTIN TIME PARTIAL: CPT

## 2023-10-30 PROCEDURE — 87077 CULTURE AEROBIC IDENTIFY: CPT

## 2023-10-30 PROCEDURE — 99285 EMERGENCY DEPT VISIT HI MDM: CPT

## 2023-10-30 PROCEDURE — 80053 COMPREHEN METABOLIC PANEL: CPT

## 2023-10-30 PROCEDURE — 87075 CULTR BACTERIA EXCEPT BLOOD: CPT

## 2023-10-30 PROCEDURE — 85610 PROTHROMBIN TIME: CPT

## 2023-10-30 PROCEDURE — 84100 ASSAY OF PHOSPHORUS: CPT

## 2023-10-30 PROCEDURE — 71045 X-RAY EXAM CHEST 1 VIEW: CPT

## 2023-10-30 PROCEDURE — 86901 BLOOD TYPING SEROLOGIC RH(D): CPT

## 2023-10-30 PROCEDURE — 87070 CULTURE OTHR SPECIMN AEROBIC: CPT

## 2023-10-30 PROCEDURE — 83735 ASSAY OF MAGNESIUM: CPT

## 2023-10-30 PROCEDURE — 86900 BLOOD TYPING SEROLOGIC ABO: CPT

## 2023-10-30 PROCEDURE — 85027 COMPLETE CBC AUTOMATED: CPT

## 2023-10-30 PROCEDURE — 36415 COLL VENOUS BLD VENIPUNCTURE: CPT

## 2023-10-30 PROCEDURE — 99232 SBSQ HOSP IP/OBS MODERATE 35: CPT

## 2023-10-30 PROCEDURE — 86850 RBC ANTIBODY SCREEN: CPT

## 2023-10-30 PROCEDURE — 86902 BLOOD TYPE ANTIGEN DONOR EA: CPT

## 2023-10-30 PROCEDURE — C9399: CPT

## 2023-10-30 RX ORDER — OXYCODONE HYDROCHLORIDE 5 MG/1
1 TABLET ORAL
Qty: 20 | Refills: 0
Start: 2023-10-30

## 2023-10-30 RX ORDER — MAGNESIUM SULFATE 500 MG/ML
1 VIAL (ML) INJECTION ONCE
Refills: 0 | Status: COMPLETED | OUTPATIENT
Start: 2023-10-30 | End: 2023-10-30

## 2023-10-30 RX ORDER — LEVOFLOXACIN 5 MG/ML
1 INJECTION, SOLUTION INTRAVENOUS
Qty: 9 | Refills: 0
Start: 2023-10-30 | End: 2023-11-07

## 2023-10-30 RX ADMIN — OXYCODONE HYDROCHLORIDE 10 MILLIGRAM(S): 5 TABLET ORAL at 00:26

## 2023-10-30 RX ADMIN — PIPERACILLIN AND TAZOBACTAM 25 GRAM(S): 4; .5 INJECTION, POWDER, LYOPHILIZED, FOR SOLUTION INTRAVENOUS at 00:25

## 2023-10-30 RX ADMIN — OXYCODONE HYDROCHLORIDE 10 MILLIGRAM(S): 5 TABLET ORAL at 01:30

## 2023-10-30 RX ADMIN — Medication 81 MILLIGRAM(S): at 11:25

## 2023-10-30 RX ADMIN — HYDROMORPHONE HYDROCHLORIDE 0.5 MILLIGRAM(S): 2 INJECTION INTRAMUSCULAR; INTRAVENOUS; SUBCUTANEOUS at 11:25

## 2023-10-30 RX ADMIN — Medication 1 PATCH: at 07:30

## 2023-10-30 RX ADMIN — CLOPIDOGREL BISULFATE 75 MILLIGRAM(S): 75 TABLET, FILM COATED ORAL at 11:25

## 2023-10-30 RX ADMIN — Medication 1 PATCH: at 13:32

## 2023-10-30 RX ADMIN — HYDROMORPHONE HYDROCHLORIDE 0.5 MILLIGRAM(S): 2 INJECTION INTRAMUSCULAR; INTRAVENOUS; SUBCUTANEOUS at 11:40

## 2023-10-30 RX ADMIN — Medication 1 PATCH: at 13:51

## 2023-10-30 RX ADMIN — Medication 100 GRAM(S): at 08:54

## 2023-10-30 RX ADMIN — OXYCODONE HYDROCHLORIDE 10 MILLIGRAM(S): 5 TABLET ORAL at 10:55

## 2023-10-30 RX ADMIN — Medication 1 TABLET(S): at 13:51

## 2023-10-30 RX ADMIN — OXYCODONE HYDROCHLORIDE 10 MILLIGRAM(S): 5 TABLET ORAL at 05:02

## 2023-10-30 RX ADMIN — PIPERACILLIN AND TAZOBACTAM 25 GRAM(S): 4; .5 INJECTION, POWDER, LYOPHILIZED, FOR SOLUTION INTRAVENOUS at 10:29

## 2023-10-30 RX ADMIN — LISINOPRIL 2.5 MILLIGRAM(S): 2.5 TABLET ORAL at 05:01

## 2023-10-30 RX ADMIN — OXYCODONE HYDROCHLORIDE 10 MILLIGRAM(S): 5 TABLET ORAL at 09:55

## 2023-10-30 RX ADMIN — OXYCODONE HYDROCHLORIDE 10 MILLIGRAM(S): 5 TABLET ORAL at 06:04

## 2023-10-30 NOTE — DISCHARGE NOTE NURSING/CASE MANAGEMENT/SOCIAL WORK - NSDCFUADDAPPT_GEN_ALL_CORE_FT
Please follow-up with Dr. Blair in the office in 1 week. Please also follow up with your primary care doctor.

## 2023-10-30 NOTE — PROGRESS NOTE ADULT - ASSESSMENT
63F active smoker with history of HTN, PAD, obesity, s/p right femoral endarterectomy, right femoral angioplasty with stent placement on 10/2 was sent for admission for right surgical wound dehiscence    Pseudomonas aeruginosa surgical site infection   S/p wound debridement and wound VAC placement on 10/25     - Right femoral wound dehiscence without purulence   - S/p wound debridement and wound VAC placement on 10/25 >> no gross purulence   - Continue empiric piperacillin-tazobactam while hospitalized  - Surgical cultures reporting pan sensitive Pseudomonas aeruginosa  - Normal QTc  - No leukocytosis  - Afebrile and hemodynamically stable   - Wound care as per surgery   - OK to discharge on oral levofloxacin 750 mg PO daily thru 11/8     D/w VascSx and CM     ID will sign off. Please call with questions. 
Patient is a 64 y/o F with a hx of PAD and recent femoral endarterectomy now s/p groin washout for wound dehiscence 10/225.   Patient was having superficial wound bleed that required the wound vac to be removed and surgcel was placed got hemostasis.  Now wound is hemostatic, plan for wound VAC placement today will set up home wound vac too    Plan:  -Multimodal pain control  -OOB/IS as tolerated  -VNS when dc'd  - application of wound vac today  - continue antibiotics  - follow up cultures  - Hopeful dc tomorrow 
Patient is a 64 y/o F with a hx of PAD and recent femoral endarterectomy now s/p groin washout for wound dehiscence 10/25. Wound vac place on 10/27.      Plan:  - Multimodal pain control  - OOB/IS as tolerated  - VNS when dc'd  - application of wound vac Fri  - Wound vac change Mon  - Wound cultures growing rare pseudomonas
Assessment: Patient is a 62 y/o F with a hx of PAD and recent femoral endarterectomy now s/p groin washout for wound dehiscence yesterday.     Plan:  -Multimodal pain control  -OOB/IS as tolerated  -VNS when dc'd  - Daily dressing changes   - Hopeful dc tomorrow 
63F active smoker with history of HTN, PAD, obesity, s/p right femoral endarterectomy, right femoral angioplasty with stent placement on 10/2 was sent for admission for right surgical wound dehiscence      Pseudomonas aeruginosa surgical site infection   S/p wound debridement and wound VAC placement on 10/25     - Right femoral wound dehiscence without purulence   - S/p wound debridement and wound VAC placement on 10/25 >> no gross purulence   - Continue empiric piperacillin-tazobactam   - Surgical cultures reporting Pseudomonas aeruginosa  - No leukocytosis  - Afebrile and hemodynamically stable   - Wound care as per surgery     Will follow
63F active smoker with history of HTN, PAD, obesity, s/p right femoral endarterectomy, right femoral angioplasty with stent placement on 10/2 was sent for admission for right surgical wound dehiscence    - Right femoral wound dehiscence without purulence   - S/p wound debridement and wound VAC placement on 11/25 >> no gross purulence   - On empiric piperacillin-tazobactam   - Surgical cultures obtained   - No leukocytosis  - Afebrile and hemodynamically stable   - If discharge before cultures are finalized, prescribe Bactrim DS 1 tab PO BID for 1 week   - Wound care as per surgery   
Pt is a 62 yo F w PMHx of PAD and recent femoral endarterectomy is s/p groin washout for wound dehiscence on 10/25 with wound cultures growing pseudomonas aeruginosa. Wound vac was placed on 10/27.     Plan  - change wound vac dressing today  - antibiotic  - f/u cultures  -  needed for vac VNS on d/c  - multimodal pain control  - OOB/IS as tolerated  - ID recs appreciated  - DASH/TLC diet

## 2023-10-30 NOTE — DISCHARGE NOTE NURSING/CASE MANAGEMENT/SOCIAL WORK - PATIENT PORTAL LINK FT
You can access the FollowMyHealth Patient Portal offered by [facility] by registering at the following website: [email or web address]. By joining [email or web address]’s FollowMyHealth portal, you will also be able to view your health information using other applications (apps) compatible with our system.

## 2023-10-30 NOTE — PROGRESS NOTE ADULT - SUBJECTIVE AND OBJECTIVE BOX
[facility]  INFECTIOUS DISEASES at [place] and [place]  ===============================================================                               Ye Ken MD*     Ibeth Wakefield MD*                         Jay Mcdonnell MD*       Miriam De Jesus MD*            Diplomates American Board of Internal Medicine & Infectious Diseases                * [place] Office - Appt - Tel  [phone] Fax [phone]                * [place] Office - Appt - Tel [phone] Fax [phone]                                  Hospital Consult line:  [phone]  ==============================================================    KARLA TOTH [id number]    Follow up: Femoral wound dehiscence    S/p wound debridement and wound VAC placement on 11/25 >> no gross purulence   Afebrile and hemodynamically stable   Wound VAC disconnected    I have personally reviewed the labs and data; pertinent labs and data are listed in this note; please see below.     _______________________________________________________________  REVIEW OF SYSTEMS  Annoyed to be in the hospital. Removed VAC because it was itchy. Rude during eval. No fever or diarrhea.   ________________________________________________________________  Allergies:  vancomycin (Rash; Flushing; Hives)    ________________________________________________________________  PHYSICAL EXAM - exam limited, not allowed in detail   GEN: in NAD, lying in bed.   LUNGS: eupneic. CTA B/L.  : No Simental catheter  PSYCHIATRIC: rude  LINES: PIV   ________________________________________________________________  Vitals:  T(F): 97.8 (26 Oct 2023 05:14), Max: 99 (25 Oct 2023 18:15)  HR: 74 (26 Oct 2023 06:40)  BP: 129/67 (26 Oct 2023 06:40)  RR: 18 (26 Oct 2023 06:40)  SpO2: 92% (26 Oct 2023 06:40) (92% - 99%)  temp max in last 48H T(F): , Max: 99 (10-25-23 @ 18:15)    Current Antibiotics:  piperacillin/tazobactam IVPB.. 3.375 Gram(s) IV Intermittent every 8 hours    Other medications:  aspirin  chewable 81 milliGRAM(s) Oral daily  atorvastatin 20 milliGRAM(s) Oral at bedtime  clopidogrel Tablet 75 milliGRAM(s) Oral daily  influenza   Vaccine 0.5 milliLiter(s) IntraMuscular once  lisinopril 2.5 milliGRAM(s) Oral daily  multivitamin 1 Tablet(s) Oral every 24 hours  nicotine - 21 mG/24Hr(s) Patch 1 Patch Transdermal daily                            10.9   5.50  )-----------( 212      ( 26 Oct 2023 05:45 )             33.9     10-26    139  |  105  |  13.4  ----------------------------<  132<H>  4.9   |  20.0<L>  |  0.69    Ca    8.6      26 Oct 2023 05:45  Phos  4.7     10-26  Mg     1.8     10-26    TPro  7.0  /  Alb  4.1  /  TBili  0.3<L>  /  DBili  x   /  AST  16  /  ALT  10  /  AlkPhos  114  10-24    RECENT CULTURES:      WBC Count: 5.50 K/uL (10-26-23 @ 05:45)  WBC Count: 5.89 K/uL (10-25-23 @ 02:24)  WBC Count: 7.38 K/uL (10-24-23 @ 14:45)    Creatinine: 0.69 mg/dL (10-26-23 @ 05:45)  Creatinine: 0.94 mg/dL (10-25-23 @ 02:24)  Creatinine: 0.90 mg/dL (10-24-23 @ 14:45)      COVID-19 PCR: NotDetec (10-04-23 @ 06:00)  COVID-19 PCR: NotDetec (10-02-23 @ 10:28)    ________________________________________________________________  RADIOLOGY  < from: Xray Chest 1 View- PORTABLE-Urgent (Xray Chest 1 View- PORTABLE-Urgent .) (10.24.23 @ 15:26) >    FINDINGS:  CATHETERS AND TUBES: None    PULMONARY: The visualized lungs are clear of airspace consolidations or   pleural effusions.   No pneumothorax.    HEART/VASCULAR: The heart and mediastinum size and configuration are   within normal limits.    BONES: Visualized osseous thorax intact.    IMPRESSION:   No radiographic evidence of active chest disease.    < end of copied text >  
SUBJECTIVE / 24H EVENTS:  Pt evaluated at bedside this AM. Pt denies being in any acute distress. POD #5 s/p R groin washout.    MEDICATIONS  (STANDING):  aspirin  chewable 81 milliGRAM(s) Oral daily  atorvastatin 20 milliGRAM(s) Oral at bedtime  clopidogrel Tablet 75 milliGRAM(s) Oral daily  influenza   Vaccine 0.5 milliLiter(s) IntraMuscular once  lisinopril 2.5 milliGRAM(s) Oral daily  multivitamin 1 Tablet(s) Oral every 24 hours  nicotine - 21 mG/24Hr(s) Patch 1 Patch Transdermal daily  piperacillin/tazobactam IVPB.. 3.375 Gram(s) IV Intermittent every 8 hours    MEDICATIONS  (PRN):  acetaminophen     Tablet .. 650 milliGRAM(s) Oral every 6 hours PRN Temp greater or equal to 38C (100.4F), Mild Pain (1 - 3)  HYDROmorphone  Injectable 0.5 milliGRAM(s) IV Push every 4 hours PRN Severe Pain (7 - 10)  oxyCODONE    IR 10 milliGRAM(s) Oral every 4 hours PRN Severe Pain (7 - 10)  oxyCODONE    IR 5 milliGRAM(s) Oral every 4 hours PRN Moderate Pain (4 - 6)  senna 1 Tablet(s) Oral at bedtime PRN Constipation      Vital Signs Last 24 Hrs  T(C): 36.9 (30 Oct 2023 04:25), Max: 37.1 (30 Oct 2023 00:27)  T(F): 98.5 (30 Oct 2023 04:25), Max: 98.7 (30 Oct 2023 00:27)  HR: 68 (30 Oct 2023 04:25) (68 - 90)  BP: 120/72 (30 Oct 2023 04:25) (105/71 - 129/77)  BP(mean): --  RR: 18 (30 Oct 2023 04:25) (18 - 18)  SpO2: 94% (30 Oct 2023 04:25) (94% - 97%)    Parameters below as of 30 Oct 2023 04:25  Patient On (Oxygen Delivery Method): room air        Constitutional: patient resting comfortably in bed, in no acute distress  HEENT: head normocephalic and atraumatic, EOMs intact   Respiratory: respirations are unlabored, no accessory muscle use  Gastrointestinal: Abdomen soft, non-tender, non-distended, no rebound tenderness / guarding  : wound vac in place on R groin with good suction, no edema  Neurological: GCS: 15, A&O x 3  Skin: mucous membranes moist, no diaphoresis, pallor, cyanosis or jaundice      I&O's Detail      LABS:                        10.1   6.16  )-----------( 199      ( 30 Oct 2023 04:36 )             31.8     10-30    141  |  105  |  15.9  ----------------------------<  105<H>  4.4   |  25.0  |  0.85    Ca    8.9      30 Oct 2023 04:36  Phos  4.3     10-30  Mg     1.8     10-30        Urinalysis Basic - ( 30 Oct 2023 04:36 )    Color: x / Appearance: x / SG: x / pH: x  Gluc: 105 mg/dL / Ketone: x  / Bili: x / Urobili: x   Blood: x / Protein: x / Nitrite: x   Leuk Esterase: x / RBC: x / WBC x   Sq Epi: x / Non Sq Epi: x / Bacteria: x      
Subjective: Pt evaluated at bedside this AM. Resting comfortably in NAD. Wound vac in place with good suction. Will need to work on  and home vac setup.      STATUS POST:  R groin washout    POST OPERATIVE DAY #: 4    MEDICATIONS  (STANDING):  aspirin  chewable 81 milliGRAM(s) Oral daily  atorvastatin 20 milliGRAM(s) Oral at bedtime  clopidogrel Tablet 75 milliGRAM(s) Oral daily  influenza   Vaccine 0.5 milliLiter(s) IntraMuscular once  lisinopril 2.5 milliGRAM(s) Oral daily  multivitamin 1 Tablet(s) Oral every 24 hours  nicotine - 21 mG/24Hr(s) Patch 1 Patch Transdermal daily  piperacillin/tazobactam IVPB.. 3.375 Gram(s) IV Intermittent every 8 hours    MEDICATIONS  (PRN):  acetaminophen     Tablet .. 650 milliGRAM(s) Oral every 6 hours PRN Temp greater or equal to 38C (100.4F), Mild Pain (1 - 3)  HYDROmorphone  Injectable 0.5 milliGRAM(s) IV Push every 4 hours PRN Severe Pain (7 - 10)  oxyCODONE    IR 10 milliGRAM(s) Oral every 4 hours PRN Severe Pain (7 - 10)  oxyCODONE    IR 5 milliGRAM(s) Oral every 4 hours PRN Moderate Pain (4 - 6)  senna 1 Tablet(s) Oral at bedtime PRN Constipation      Vital Signs Last 24 Hrs  T(C): 36.5 (29 Oct 2023 08:02), Max: 36.9 (29 Oct 2023 04:18)  T(F): 97.7 (29 Oct 2023 08:02), Max: 98.4 (29 Oct 2023 04:18)  HR: 90 (29 Oct 2023 08:02) (70 - 90)  BP: 117/74 (29 Oct 2023 08:02) (117/74 - 131/77)  BP(mean): --  RR: 18 (29 Oct 2023 08:02) (18 - 18)  SpO2: 94% (29 Oct 2023 08:02) (93% - 94%)    Parameters below as of 29 Oct 2023 08:02  Patient On (Oxygen Delivery Method): room air        Physical Exam:    Constitutional: NAD  HEENT: PERRL, EOMI  Neck: No JVD, FROM without pain  Respiratory: Respirations non-labored, no accessory muscle use  Gastrointestinal: Soft, non-tender, non-distended  Extremities: no edema, wound vac on R groin with good suction, erythema marked at bedside  Neurological: A&O x 3; without gross deficit        LABS:                        10.4   5.86  )-----------( 197      ( 29 Oct 2023 08:33 )             32.4     10-29    142  |  103  |  12.6  ----------------------------<  114<H>  4.4   |  25.0  |  0.73    Ca    9.0      29 Oct 2023 08:33  Phos  3.9     10-29  Mg     1.9     10-29        Urinalysis Basic - ( 29 Oct 2023 08:33 )    Color: x / Appearance: x / SG: x / pH: x  Gluc: 114 mg/dL / Ketone: x  / Bili: x / Urobili: x   Blood: x / Protein: x / Nitrite: x   Leuk Esterase: x / RBC: x / WBC x   Sq Epi: x / Non Sq Epi: x / Bacteria: x        A: Patient is a 62 y/o F with a hx of PAD and recent femoral endarterectomy now s/p groin washout for wound dehiscence 10/25. Wound vac place on 10/27.    Plan:  - Multimodal pain control  - OOB/IS as tolerated  - VNS when dc'd  - Wound vac change Mon 10/30  - Wound cultures growing rare pseudomonas  - Appreciate ID recs  
INTERVAL HPI/OVERNIGHT EVENTS:    Patient evaluated at bedside. No acute distress. No acute events overnight.  Pain controlled  been ambulating  Afebrile  Dressing didn't require to be changed since yesterday    MEDICATIONS  (STANDING):  aspirin  chewable 81 milliGRAM(s) Oral daily  atorvastatin 20 milliGRAM(s) Oral at bedtime  clopidogrel Tablet 75 milliGRAM(s) Oral daily  influenza   Vaccine 0.5 milliLiter(s) IntraMuscular once  lisinopril 2.5 milliGRAM(s) Oral daily  multivitamin 1 Tablet(s) Oral every 24 hours  nicotine - 21 mG/24Hr(s) Patch 1 Patch Transdermal daily  piperacillin/tazobactam IVPB.. 3.375 Gram(s) IV Intermittent every 8 hours    MEDICATIONS  (PRN):  acetaminophen     Tablet .. 650 milliGRAM(s) Oral every 6 hours PRN Temp greater or equal to 38C (100.4F), Mild Pain (1 - 3)  HYDROmorphone  Injectable 0.5 milliGRAM(s) IV Push every 4 hours PRN Severe Pain (7 - 10)  oxyCODONE    IR 5 milliGRAM(s) Oral every 4 hours PRN Moderate Pain (4 - 6)  oxyCODONE    IR 10 milliGRAM(s) Oral every 4 hours PRN Severe Pain (7 - 10)  senna 1 Tablet(s) Oral at bedtime PRN Constipation      Vital Signs Last 24 Hrs  T(C): 36.5 (27 Oct 2023 07:46), Max: 36.9 (26 Oct 2023 21:44)  T(F): 97.7 (27 Oct 2023 07:46), Max: 98.5 (26 Oct 2023 21:44)  HR: 76 (27 Oct 2023 07:46) (67 - 90)  BP: 98/62 (27 Oct 2023 07:46) (98/61 - 114/69)  BP(mean): --  RR: 19 (27 Oct 2023 07:46) (18 - 19)  SpO2: 92% (27 Oct 2023 07:46) (91% - 94%)    Parameters below as of 27 Oct 2023 04:04  Patient On (Oxygen Delivery Method): room air      Exam:  Gen: pt lying in bed, alert, in NAD  Resp: unlabored  CVS: RRR  Abd: soft, NT, ND, pressure dressing with some strikethrough, no gross bleeding   Dressing changed at bedside surgcel removed, no signs of infection or bleeding  Ext: moving all extremities spontaneously, sensation intact. Extremities are wwp with palpable peripheral pulses        I&O's Detail    26 Oct 2023 07:01  -  27 Oct 2023 07:00  --------------------------------------------------------  IN:    Oral Fluid: 440 mL  Total IN: 440 mL    OUT:  Total OUT: 0 mL    Total NET: 440 mL          LABS:                        9.5    6.92  )-----------( 182      ( 27 Oct 2023 05:35 )             28.9     10-27    140  |  106  |  20.6<H>  ----------------------------<  99  4.1   |  23.0  |  0.93    Ca    8.3<L>      27 Oct 2023 05:35  Phos  3.5     10-27  Mg     1.8     10-27        Urinalysis Basic - ( 27 Oct 2023 05:35 )    Color: x / Appearance: x / SG: x / pH: x  Gluc: 99 mg/dL / Ketone: x  / Bili: x / Urobili: x   Blood: x / Protein: x / Nitrite: x   Leuk Esterase: x / RBC: x / WBC x   Sq Epi: x / Non Sq Epi: x / Bacteria: x        RADIOLOGY & ADDITIONAL STUDIES:
KARLA TOTH    [id number]    History:    Seen and examined   Plan for operative intervention today, npo since midnight    Patient is doing well.    Denies nausea, vomiting, chest pain, shortness of breath, abdominal pain or fever. No new complaints. No acute motor or sensory changes are reported.    Vital Signs Last 24 Hrs  T(C): 36.4 (25 Oct 2023 04:52), Max: 36.8 (24 Oct 2023 19:21)  T(F): 97.5 (25 Oct 2023 04:52), Max: 98.2 (24 Oct 2023 19:21)  HR: 72 (25 Oct 2023 04:52) (68 - 95)  BP: 150/83 (25 Oct 2023 04:52) (115/74 - 150/83)  BP(mean): --  RR: 17 (25 Oct 2023 04:52) (17 - 20)  SpO2: 96% (25 Oct 2023 04:52) (95% - 98%)    Parameters below as of 25 Oct 2023 04:52  Patient On (Oxygen Delivery Method): room air      I&O's Summary                            11.0   5.89  )-----------( 196      ( 25 Oct 2023 02:24 )             34.0     10-25    141  |  106  |  15.4  ----------------------------<  105<H>  4.5   |  22.0  |  0.94    Ca    8.8      25 Oct 2023 02:24    TPro  7.0  /  Alb  4.1  /  TBili  0.3<L>  /  DBili  x   /  AST  16  /  ALT  10  /  AlkPhos  114  10-24      MEDICATIONS  (STANDING):  influenza   Vaccine 0.5 milliLiter(s) IntraMuscular once  multivitamin 1 Tablet(s) Oral daily  nicotine - 21 mG/24Hr(s) Patch 1 Patch Transdermal daily  piperacillin/tazobactam IVPB.. 3.375 Gram(s) IV Intermittent every 8 hours    MEDICATIONS  (PRN):  acetaminophen     Tablet .. 650 milliGRAM(s) Oral every 6 hours PRN Temp greater or equal to 38C (100.4F), Mild Pain (1 - 3)  HYDROmorphone  Injectable 0.5 milliGRAM(s) IV Push every 4 hours PRN break thru pain  ondansetron Injectable 4 milliGRAM(s) IV Push every 6 hours PRN Nausea  oxyCODONE    IR 5 milliGRAM(s) Oral every 4 hours PRN Moderate Pain (4 - 6)  oxyCODONE    IR 10 milliGRAM(s) Oral every 4 hours PRN Severe Pain (7 - 10)  senna 2 Tablet(s) Oral at bedtime PRN Constipation      Physical exam:     No distress  alert and oriented   non labored breathing  abdomen is obese, soft  right groin, open, no purulence   right calf ulceration unchanged, surrounding ecchymosis present  pedal pulse, DP, palpable      Primary  Assessment:    Right groin wound dehiscence  Right persistent calf wound     •   Plan:   • plan for surgical debridement/washout, muscle flap coverage today  - pending cardiology risk stratification   - keep NPO    
[facility]  INFECTIOUS DISEASES at [place] / [place] / [place]  =======================================================                               Ye Ken MD#   Jay Mcdonnell MD*                             Ibeth Wakefield MD*   Miriam De Jesus MD*                              Professor Emeritus:  Dr Diaz Echevarria MD^            Diplomates American Board of Internal Medicine & Infectious Diseases                # [place] Office - Appt - Tel  [phone] Fax [phone]                * [place] Office - Appt - Tel [phone] Fax [phone]                      ^[place] Office - Tel  [phone] Fax [phone]                                  Hospital Consult line:  [phone]  =======================================================    KARLA TOTH [id number]    Follow up: Pseudomonas aeruginosa surgical site infection     Patient c/o being in the hospital  Refusing to believe me that she has an infection      Allergies:  vancomycin (Rash; Flushing; Hives)       REVIEW OF SYSTEMS:  CONSTITUTIONAL:  No Fever or chills  HEENT:   No diplopia or blurred vision.  No earache, sore throat or runny nose.  CARDIOVASCULAR:  No Chest Pain  RESPIRATORY:  No cough, shortness of breath  GASTROINTESTINAL:  No nausea, vomiting or diarrhea.  MUSCULOSKELETAL:  no joint aches, no muscle pain  NEUROLOGIC:  No Headaches      Physical Exam:  GEN: NAD  HEENT: normocephalic and atraumatic.   NECK: Supple.   PSYCHIATRIC: Angry   SKIN: _ wound vac      Vitals:  T(F): 98.4 (29 Oct 2023 04:18), Max: 98.4 (29 Oct 2023 04:18)  HR: 73 (29 Oct 2023 04:18)  BP: 126/74 (29 Oct 2023 04:18)  RR: 18 (29 Oct 2023 04:18)  SpO2: 93% (29 Oct 2023 04:18) (93% - 94%)  temp max in last 48H T(F): , Max: 98.4 (10-29-23 @ 04:18)    Current Antibiotics:  piperacillin/tazobactam IVPB.. 3.375 Gram(s) IV Intermittent every 8 hours    Other medications:  aspirin  chewable 81 milliGRAM(s) Oral daily  atorvastatin 20 milliGRAM(s) Oral at bedtime  clopidogrel Tablet 75 milliGRAM(s) Oral daily  influenza   Vaccine 0.5 milliLiter(s) IntraMuscular once  lisinopril 2.5 milliGRAM(s) Oral daily  multivitamin 1 Tablet(s) Oral every 24 hours  nicotine - 21 mG/24Hr(s) Patch 1 Patch Transdermal daily                            9.3    6.48  )-----------( 165      ( 28 Oct 2023 13:24 )             28.2     10-28    142  |  106  |  19.0  ----------------------------<  113<H>  4.5   |  25.0  |  0.94    Ca    8.6      28 Oct 2023 13:24  Phos  4.5     10-28  Mg     1.7     10-28      RECENT CULTURES:  10-25 @ 18:30 .Surgical Swab right groin deep tissue (swab) Pseudomonas aeruginosa    Rare Pseudomonas aeruginosa      WBC Count: 6.48 K/uL (10-28-23 @ 13:24)  WBC Count: 6.92 K/uL (10-27-23 @ 05:35)  WBC Count: 5.50 K/uL (10-26-23 @ 05:45)  WBC Count: 5.89 K/uL (10-25-23 @ 02:24)  WBC Count: 7.38 K/uL (10-24-23 @ 14:45)    Creatinine: 0.94 mg/dL (10-28-23 @ 13:24)  Creatinine: 0.93 mg/dL (10-27-23 @ 05:35)  Creatinine: 0.69 mg/dL (10-26-23 @ 05:45)  Creatinine: 0.94 mg/dL (10-25-23 @ 02:24)  Creatinine: 0.90 mg/dL (10-24-23 @ 14:45)     COVID-19 PCR: NotDetec (10-04-23 @ 06:00)  COVID-19 PCR: NotDetec (10-02-23 @ 10:28)                
VASCULAR SURGERY PROGRESS NOTE    Subjective:   Patient examined at bedside this AM. Reports some groin pain from vac. Otherwise no new issues or concerns. NAEO    Objective:  Vital Signs  T(C): 36.8 (10-28 @ 16:31), Max: 36.8 (10-28 @ 16:31)  HR: 70 (10-28 @ 16:31) (70 - 82)  BP: 123/80 (10-28 @ 16:31) (103/64 - 137/77)  RR: 18 (10-28 @ 16:31) (17 - 18)  SpO2: 94% (10-28 @ 16:31) (93% - 94%)      Physical Exam:  General: alert and oriented, NAD  Resp: airway patent, respirations unlabored  Extremities: no edema, wound vac on R groin with good suction, erythema marked at bedside  Skin: warm, dry, appropriate color      Labs:                        9.3    6.48  )-----------( 165      ( 28 Oct 2023 13:24 )             28.2   10-28    142  |  106  |  19.0  ----------------------------<  113<H>  4.5   |  25.0  |  0.94    Ca    8.6      28 Oct 2023 13:24  Phos  4.5     10-28  Mg     1.7     10-28      CAPILLARY BLOOD GLUCOSE  
[facility]  INFECTIOUS DISEASES at [place] and [place]  ===============================================================                               Ye Ken MD*     Ibeth Wakefield MD*                         Jay Mcdonnell MD*       Miriam De Jesus MD*            Diplomates American Board of Internal Medicine & Infectious Diseases                * [place] Office - Appt - Tel  [phone] Fax [phone]                * [place] Office - Appt - Tel [phone] Fax [phone]                                  Hospital Consult line:  [phone]  ==============================================================    KARLA TOTH [id number]    Follow up: wound dehiscence     Despite multiple conversations, patient still questions whether she has an infection   Afebrile, hemodynamically stable   Discharge planning in progress pending insurance approval for wound VAC     I have personally reviewed the labs and data; pertinent labs and data are listed in this note; please see below.     _______________________________________________________________  REVIEW OF SYSTEMS  Wants to leave as soon as possible. No issues with abx.   ________________________________________________________________  Allergies:  vancomycin (Rash; Flushing; Hives)    ________________________________________________________________  PHYSICAL EXAM  GEN: in NAD, obese  HEENT: Anicteric sclerae  NECK: Supple.   LUNGS: eupneic.   : No Simental catheter  EXTREMITIES: right fem wound VAC in place   LINES: PIV   ________________________________________________________________  Vitals:  T(F): 98.1 (30 Oct 2023 08:14), Max: 98.7 (30 Oct 2023 00:27)  HR: 72 (30 Oct 2023 08:14)  BP: 125/73 (30 Oct 2023 08:14)  RR: 18 (30 Oct 2023 08:14)  SpO2: 95% (30 Oct 2023 08:14) (94% - 97%)  temp max in last 48H T(F): , Max: 98.7 (10-30-23 @ 00:27)    Current Antibiotics:  piperacillin/tazobactam IVPB.. 3.375 Gram(s) IV Intermittent every 8 hours    Other medications:  aspirin  chewable 81 milliGRAM(s) Oral daily  atorvastatin 20 milliGRAM(s) Oral at bedtime  clopidogrel Tablet 75 milliGRAM(s) Oral daily  influenza   Vaccine 0.5 milliLiter(s) IntraMuscular once  lisinopril 2.5 milliGRAM(s) Oral daily  multivitamin 1 Tablet(s) Oral every 24 hours  nicotine - 21 mG/24Hr(s) Patch 1 Patch Transdermal daily                            10.1   6.16  )-----------( 199      ( 30 Oct 2023 04:36 )             31.8     10-30    141  |  105  |  15.9  ----------------------------<  105<H>  4.4   |  25.0  |  0.85    Ca    8.9      30 Oct 2023 04:36  Phos  4.3     10-30  Mg     1.8     10-30      RECENT CULTURES:  10-25 @ 18:30 .Surgical Swab right groin deep tissue (swab) Pseudomonas aeruginosa    Rare Pseudomonas aeruginosa  Culture - Surgical Swab (10.25.23 @ 18:30)    -  Amikacin: S <=16   -  Aztreonam: S <=4   -  Cefepime: S <=2   -  Ceftazidime: S <=1   -  Ciprofloxacin: S <=0.25   -  Gentamicin: S <=2   -  Imipenem: S <=1   -  Levofloxacin: S <=0.5   -  Meropenem: S <=1   -  Piperacillin/Tazobactam: S <=8   -  Tobramycin: S <=2    WBC Count: 6.16 K/uL (10-30-23 @ 04:36)  WBC Count: 5.86 K/uL (10-29-23 @ 08:33)  WBC Count: 6.48 K/uL (10-28-23 @ 13:24)  WBC Count: 6.92 K/uL (10-27-23 @ 05:35)  WBC Count: 5.50 K/uL (10-26-23 @ 05:45)    Creatinine: 0.85 mg/dL (10-30-23 @ 04:36)  Creatinine: 0.73 mg/dL (10-29-23 @ 08:33)  Creatinine: 0.94 mg/dL (10-28-23 @ 13:24)  Creatinine: 0.93 mg/dL (10-27-23 @ 05:35)  Creatinine: 0.69 mg/dL (10-26-23 @ 05:45)       COVID-19 PCR: NotDetec (10-04-23 @ 06:00)  COVID-19 PCR: NotDetec (10-02-23 @ 10:28)    ________________________________________________________________  RADIOLOGY  < from: Xray Chest 1 View- PORTABLE-Urgent (Xray Chest 1 View- PORTABLE-Urgent .) (10.24.23 @ 15:26) >  FINDINGS:  CATHETERS AND TUBES: None    PULMONARY: The visualized lungs are clear of airspace consolidations or   pleural effusions.   No pneumothorax.    HEART/VASCULAR: The heart and mediastinum size and configuration are   within normal limits.    BONES: Visualized osseous thorax intact.    IMPRESSION:   No radiographic evidence of active chest disease.    < end of copied text >  
Patient seen and examined at bedside. Cutaneous oozing from surgical site, which clogged vac overnight. Vac taken off and pressure dressing placed. Hb stable from preop.    MEDICATIONS  (STANDING):  aspirin  chewable 81 milliGRAM(s) Oral daily  atorvastatin 20 milliGRAM(s) Oral at bedtime  clopidogrel Tablet 75 milliGRAM(s) Oral daily  influenza   Vaccine 0.5 milliLiter(s) IntraMuscular once  lisinopril 2.5 milliGRAM(s) Oral daily  multivitamin 1 Tablet(s) Oral every 24 hours  nicotine - 21 mG/24Hr(s) Patch 1 Patch Transdermal daily  piperacillin/tazobactam IVPB.. 3.375 Gram(s) IV Intermittent every 8 hours    MEDICATIONS  (PRN):  acetaminophen     Tablet .. 650 milliGRAM(s) Oral every 6 hours PRN Temp greater or equal to 38C (100.4F), Mild Pain (1 - 3)  HYDROmorphone  Injectable 0.5 milliGRAM(s) IV Push every 4 hours PRN Severe Pain (7 - 10)  oxyCODONE    IR 10 milliGRAM(s) Oral every 4 hours PRN Severe Pain (7 - 10)  oxyCODONE    IR 5 milliGRAM(s) Oral every 4 hours PRN Moderate Pain (4 - 6)  senna 1 Tablet(s) Oral at bedtime PRN Constipation    Vital Signs Last 24 Hrs  T(C): 36.6 (26 Oct 2023 15:13), Max: 37.2 (25 Oct 2023 18:15)  T(F): 97.9 (26 Oct 2023 15:13), Max: 99 (25 Oct 2023 18:15)  HR: 90 (26 Oct 2023 16:03) (65 - 90)  BP: 105/58 (26 Oct 2023 16:03) (98/61 - 159/82)  BP(mean): 82 (25 Oct 2023 18:30) (82 - 88)  RR: 18 (26 Oct 2023 15:13) (15 - 20)  SpO2: 94% (26 Oct 2023 15:13) (92% - 99%)    Parameters below as of 26 Oct 2023 15:13  Patient On (Oxygen Delivery Method): room air                          10.9   5.50  )-----------( 212      ( 26 Oct 2023 05:45 )             33.9   10-26    139  |  105  |  13.4  ----------------------------<  132<H>  4.9   |  20.0<L>  |  0.69    Ca    8.6      26 Oct 2023 05:45  Phos  4.7     10-26  Mg     1.8     10-26        Exam:  Gen: pt lying in bed, alert, in NAD  Resp: unlabored  CVS: RRR  Abd: soft, NT, ND, pressure dressing with some strikethrough, no gross bleeding   Ext: moving all extremities spontaneously, sensation intact. Extremities are wwp

## 2023-10-30 NOTE — DISCHARGE NOTE NURSING/CASE MANAGEMENT/SOCIAL WORK - NSDCPEWEB_GEN_ALL_CORE
[facility] for Tobacco Control website --- [email or web address] --- [email or web address]

## 2023-10-30 NOTE — PROGRESS NOTE ADULT - PROVIDER SPECIALTY LIST ADULT
Infectious Disease
Vascular Surgery
Vascular Surgery
Infectious Disease
Infectious Disease
Vascular Surgery

## 2023-10-30 NOTE — DISCHARGE NOTE NURSING/CASE MANAGEMENT/SOCIAL WORK - NSDCPEEMAIL_GEN_ALL_CORE
[facility] for Tobacco Control email [email or web address]

## 2023-10-30 NOTE — CHART NOTE - NSCHARTNOTEFT_GEN_A_CORE
Patient seen and examined at bedside. Vac had lost suction and there was some blood tracking underneath the adherent dressing. Vac changed and seal confirmed on leak test. Aside from some moderate postoperative pain which is controlled with analgesics the patient has no complaints and is doing well.    MEDICATIONS  (STANDING):  aspirin  chewable 81 milliGRAM(s) Oral daily  atorvastatin 20 milliGRAM(s) Oral at bedtime  clopidogrel Tablet 75 milliGRAM(s) Oral daily  HYDROmorphone  Injectable 0.5 milliGRAM(s) IV Push once  influenza   Vaccine 0.5 milliLiter(s) IntraMuscular once  lisinopril 2.5 milliGRAM(s) Oral daily  multivitamin 1 Tablet(s) Oral every 24 hours  nicotine - 21 mG/24Hr(s) Patch 1 Patch Transdermal daily  piperacillin/tazobactam IVPB.. 3.375 Gram(s) IV Intermittent every 8 hours    MEDICATIONS  (PRN):  acetaminophen     Tablet .. 650 milliGRAM(s) Oral every 6 hours PRN Temp greater or equal to 38C (100.4F), Mild Pain (1 - 3)  HYDROmorphone  Injectable 0.5 milliGRAM(s) IV Push every 4 hours PRN Severe Pain (7 - 10)  oxyCODONE    IR 10 milliGRAM(s) Oral every 4 hours PRN Severe Pain (7 - 10)  oxyCODONE    IR 5 milliGRAM(s) Oral every 4 hours PRN Moderate Pain (4 - 6)  senna 1 Tablet(s) Oral at bedtime PRN Constipation    Vital Signs Last 24 Hrs  T(C): 37.1 (25 Oct 2023 20:10), Max: 37.2 (25 Oct 2023 18:15)  T(F): 98.7 (25 Oct 2023 20:10), Max: 99 (25 Oct 2023 18:15)  HR: 72 (25 Oct 2023 20:10) (72 - 94)  BP: 144/75 (25 Oct 2023 20:10) (111/65 - 159/82)  BP(mean): 82 (25 Oct 2023 18:30) (74 - 99)  RR: 20 (25 Oct 2023 20:10) (15 - 20)  SpO2: 93% (25 Oct 2023 20:10) (92% - 99%)    Parameters below as of 25 Oct 2023 20:10  Patient On (Oxygen Delivery Method): room air                          11.0   5.89  )-----------( 196      ( 25 Oct 2023 02:24 )             34.0   10-25    141  |  106  |  15.4  ----------------------------<  105<H>  4.5   |  22.0  |  0.94    Ca    8.8      25 Oct 2023 02:24    TPro  7.0  /  Alb  4.1  /  TBili  0.3<L>  /  DBili  x   /  AST  16  /  ALT  10  /  AlkPhos  114  10-24      Exam:  Gen: pt lying in bed, alert, in NAD  Resp: unlabored  CVS: RRR  Abd: soft, NT, ND. Groin wound with some mild oozing underneath vac sponge. No hematoma.   Ext: moving all extremities spontaneously, sensation intact, extremities are wwp.    Assessment: Patient is a 64 y/o F w/ a history of PAD and recent right femoral endarterectomy now s/p right groin washout for wound dehiscence.     Plan:  -Multimodal pain control  -Vac change in 72 hours  -Will need VNS  -OOB/IS as tolerated  -Regular diet  -Hopeful dc tomorrow
Pt has repeatedly refused cardiac monitoring. Has been counseled on importance of cardiac monitoring while inpatient given her comorbidities. Cardiac monitoring will be discontinued.
Right groin dressing removed and area cleansed. No active bleeding. Minimal wound edge induration with some fibrinous tissue base. only subcutaneous tissue easily visualized.  black sponge cut to wound dimensions and placed with occlusive tegaderm covering. Good seal without leak set to pressure of 50 due to patient tolerance   pre and post medication ordered

## 2023-10-31 LAB
CULTURE RESULTS: ABNORMAL
CULTURE RESULTS: ABNORMAL
ORGANISM # SPEC MICROSCOPIC CNT: ABNORMAL
ORGANISM # SPEC MICROSCOPIC CNT: ABNORMAL
ORGANISM # SPEC MICROSCOPIC CNT: SIGNIFICANT CHANGE UP
ORGANISM # SPEC MICROSCOPIC CNT: SIGNIFICANT CHANGE UP
SPECIMEN SOURCE: SIGNIFICANT CHANGE UP
SPECIMEN SOURCE: SIGNIFICANT CHANGE UP

## 2023-11-02 RX ORDER — ONDANSETRON 8 MG/1
1 TABLET, FILM COATED ORAL
Qty: 15 | Refills: 0
Start: 2023-11-02 | End: 2023-11-06

## 2023-11-07 ENCOUNTER — APPOINTMENT (OUTPATIENT)
Dept: VASCULAR SURGERY | Facility: CLINIC | Age: 63
End: 2023-11-07
Payer: MEDICAID

## 2023-11-07 VITALS
BODY MASS INDEX: 35.23 KG/M2 | HEART RATE: 94 BPM | HEIGHT: 65.5 IN | RESPIRATION RATE: 14 BRPM | SYSTOLIC BLOOD PRESSURE: 147 MMHG | WEIGHT: 214 LBS | DIASTOLIC BLOOD PRESSURE: 72 MMHG | OXYGEN SATURATION: 97 %

## 2023-11-07 DIAGNOSIS — T81.30XA DISRUPTION OF WOUND, UNSPECIFIED, INITIAL ENCOUNTER: ICD-10-CM

## 2023-11-07 PROCEDURE — 99024 POSTOP FOLLOW-UP VISIT: CPT

## 2023-12-01 ENCOUNTER — APPOINTMENT (OUTPATIENT)
Dept: VASCULAR SURGERY | Facility: CLINIC | Age: 63
End: 2023-12-01
Payer: MEDICAID

## 2023-12-01 VITALS
BODY MASS INDEX: 35.06 KG/M2 | HEIGHT: 65.5 IN | DIASTOLIC BLOOD PRESSURE: 77 MMHG | TEMPERATURE: 98 F | RESPIRATION RATE: 14 BRPM | HEART RATE: 85 BPM | OXYGEN SATURATION: 98 % | SYSTOLIC BLOOD PRESSURE: 155 MMHG | WEIGHT: 213 LBS

## 2023-12-01 DIAGNOSIS — I73.9 PERIPHERAL VASCULAR DISEASE, UNSPECIFIED: ICD-10-CM

## 2023-12-01 PROCEDURE — 99212 OFFICE O/P EST SF 10 MIN: CPT | Mod: 24

## 2023-12-01 PROCEDURE — 99024 POSTOP FOLLOW-UP VISIT: CPT

## 2023-12-11 NOTE — DISCHARGE NOTE NURSING/CASE MANAGEMENT/SOCIAL WORK - NSDCPEPAMP_GEN_ALL_CORE
76 y/o F, from home with PMHx of three previous TIAs (no residual deficits), HLD, and migraines who presents with difficulty speaking. Symptoms resolved in ED. Admitted for TIA workup. 74 y/o F, from home with PMHx of three previous TIAs (no residual deficits), HLD, and migraines who presents with difficulty speaking. Symptoms resolved in ED. Admitted for TIA workup. 76 y/o F, from home with PMHx of three previous TIAs (no residual deficits), HLD, ILR and migraines who presents with difficulty speaking. Symptoms resolved in ED. Admitted for TIA workup.  Patient had a non sustained run of A tach. Electrophysiology was consulted “[facility] for Tobacco Control” pamphlet given

## 2023-12-15 ENCOUNTER — APPOINTMENT (OUTPATIENT)
Dept: VASCULAR SURGERY | Facility: CLINIC | Age: 63
End: 2023-12-15
Payer: MEDICAID

## 2023-12-15 VITALS
TEMPERATURE: 99.2 F | HEIGHT: 65.5 IN | BODY MASS INDEX: 35.06 KG/M2 | DIASTOLIC BLOOD PRESSURE: 84 MMHG | RESPIRATION RATE: 14 BRPM | HEART RATE: 88 BPM | OXYGEN SATURATION: 96 % | SYSTOLIC BLOOD PRESSURE: 160 MMHG | WEIGHT: 213 LBS

## 2023-12-15 PROCEDURE — 99213 OFFICE O/P EST LOW 20 MIN: CPT | Mod: 24

## 2023-12-15 NOTE — ASSESSMENT
[FreeTextEntry1] : Patient with revision right lower extremity intervention.  Recent [place] PVRs showed excellent flow and calf.  She does have a palpable right DP pulse so perhaps ABIs not entirely accurate but much improved compared to preoperatively.  She is to continue wound care likely in time calf wound will heal.  Regards to left lower extremity she does have a bypass currently with good pulse we will need to continue surveillance.  Patient would follow-up in 2 months of bilateral lower extremity arterial duplex.

## 2023-12-15 NOTE — HISTORY OF PRESENT ILLNESS
[FreeTextEntry1] : Patient was recently status post right common femoral endarterectomy right SFA stents for nonhealing wound on the right lateral calf.  Subsequently required washout.  She is doing well she still has the ulcer on her right lateral calf which she is seeing wound care for.  She is putting Gillian on it.  She has a history of left lower extremity bypass with no wounds on the left lower extremity currently.

## 2023-12-15 NOTE — PHYSICAL EXAM
[Respiratory Effort] : normal respiratory effort [2+] : left 2+ [de-identified] : Appears well in no acute distress [de-identified] : Bilateral lower extremities with mild edema.  The lateral calf wound appears clean and has Gillian on top of it.  Right groin wound is essentially completely healed.

## 2024-02-16 ENCOUNTER — APPOINTMENT (OUTPATIENT)
Dept: VASCULAR SURGERY | Facility: CLINIC | Age: 64
End: 2024-02-16
Payer: MEDICAID

## 2024-02-16 VITALS
TEMPERATURE: 97.3 F | BODY MASS INDEX: 36.21 KG/M2 | SYSTOLIC BLOOD PRESSURE: 138 MMHG | OXYGEN SATURATION: 96 % | HEIGHT: 65.5 IN | HEART RATE: 81 BPM | DIASTOLIC BLOOD PRESSURE: 82 MMHG | WEIGHT: 220 LBS | RESPIRATION RATE: 14 BRPM

## 2024-02-16 DIAGNOSIS — I70.209 UNSPECIFIED ATHEROSCLEROSIS OF NATIVE ARTERIES OF EXTREMITIES, UNSPECIFIED EXTREMITY: ICD-10-CM

## 2024-02-16 DIAGNOSIS — L98.499 UNSPECIFIED ATHEROSCLEROSIS OF NATIVE ARTERIES OF EXTREMITIES, UNSPECIFIED EXTREMITY: ICD-10-CM

## 2024-02-16 PROCEDURE — 93925 LOWER EXTREMITY STUDY: CPT

## 2024-02-16 PROCEDURE — 99213 OFFICE O/P EST LOW 20 MIN: CPT

## 2024-02-16 NOTE — PHYSICAL EXAM
[Respiratory Effort] : normal respiratory effort [de-identified] :  Appears well in no acute distress. [FreeTextEntry1] : Right lower extremity with palpable right DP pulse otherwise Doppler signals. [de-identified] : Right lower extremity with a small dime sized wound with little bit of fibrinous base.  No evidence of infection.

## 2024-02-16 NOTE — ASSESSMENT
[FreeTextEntry1] : 63-year-old female with atherosclerotic disease.  History of right lower extremity rest pain with ulcer on the right lateral calf.  The rest pain is resolved.  The ulcer continues to cause issues but is not longer painful.  Duplex ultrasound suggest the right iliac artery stenosis and possibly an SFA stenosis.  Given the patch angioplasty and long segment stents we will evaluate with a CT angiogram I will call her with the results and discuss how to proceed from there.  I spent 22 minutes evaluating the patient and discussing the results of ultrasound.

## 2024-02-16 NOTE — HISTORY OF PRESENT ILLNESS
[FreeTextEntry1] : 63-year-old female status post femoral endarterectomy and iliac angioplasty and stenting and right SFA stent.  She is doing well she continues at the wound of the right lateral calf.  She is seeing wound care specialist.  She denies any pain in the area or any foot.  She continues to smoke despite multiple conversations.

## 2024-02-24 ENCOUNTER — OUTPATIENT (OUTPATIENT)
Dept: OUTPATIENT SERVICES | Facility: HOSPITAL | Age: 64
LOS: 1 days | End: 2024-02-24

## 2024-02-24 ENCOUNTER — APPOINTMENT (OUTPATIENT)
Dept: CT IMAGING | Facility: CLINIC | Age: 64
End: 2024-02-24
Payer: MEDICAID

## 2024-02-24 DIAGNOSIS — Z95.828 PRESENCE OF OTHER VASCULAR IMPLANTS AND GRAFTS: Chronic | ICD-10-CM

## 2024-02-24 DIAGNOSIS — Z96.641 PRESENCE OF RIGHT ARTIFICIAL HIP JOINT: Chronic | ICD-10-CM

## 2024-02-24 PROCEDURE — 75635 CT ANGIO ABDOMINAL ARTERIES: CPT | Mod: 26

## 2024-04-18 ENCOUNTER — APPOINTMENT (OUTPATIENT)
Dept: VASCULAR SURGERY | Facility: HOSPITAL | Age: 64
End: 2024-04-18

## 2024-08-26 NOTE — PHYSICAL THERAPY INITIAL EVALUATION ADULT - NUMBER OF STAIRS, REHAB EVAL
[Normal Development] : Normal Development [None] : none [Goes to the bathroom and has] : goes to bathroom and has bowel movement by self [Dresses and undresses without] : dresses and undresses without much help [Plays make-believe] : plays make-believe [Uses 4-word sentences] : uses 4-word sentences [Uses words that are 100%] : uses words that are 100% intelligible to strangers [Tells a story from a book] : tells a story from a book [Climbs stairs, alternating feet] : climbs stairs, alternating feet without support [Skips on one foot] : skips on one foot [Draws a person with head and] : draws a person with head and 3 body part [Draws a simple cross] : draws a simple cross [Unbuttons medium-sized buttons] : unbuttons medium sized buttons [Grasps a pencil with thumb and] : grasps a pencil with thumb and fingers instead of fist 4

## 2024-10-25 NOTE — PATIENT PROFILE ADULT - NSTOBACCO QUIT READY_GEN_A_CORE_SD
How Severe Is It?: moderate Is This A New Presentation, Or A Follow-Up?: Dandruff Additional History: Pt recently used a hair product that she was allergic to and stopped using it a few days ago and has noticed some improvement since. Statement Selected thinking about quitting

## 2025-02-25 ENCOUNTER — APPOINTMENT (OUTPATIENT)
Dept: PULMONOLOGY | Facility: CLINIC | Age: 65
End: 2025-02-25
Payer: MEDICAID

## 2025-02-25 VITALS
WEIGHT: 189 LBS | RESPIRATION RATE: 16 BRPM | HEART RATE: 72 BPM | HEIGHT: 65 IN | OXYGEN SATURATION: 97 % | DIASTOLIC BLOOD PRESSURE: 82 MMHG | SYSTOLIC BLOOD PRESSURE: 148 MMHG | BODY MASS INDEX: 31.49 KG/M2

## 2025-02-25 DIAGNOSIS — R06.02 SHORTNESS OF BREATH: ICD-10-CM

## 2025-02-25 DIAGNOSIS — F17.200 NICOTINE DEPENDENCE, UNSPECIFIED, UNCOMPLICATED: ICD-10-CM

## 2025-02-25 DIAGNOSIS — J44.9 CHRONIC OBSTRUCTIVE PULMONARY DISEASE, UNSPECIFIED: ICD-10-CM

## 2025-02-25 PROCEDURE — 94010 BREATHING CAPACITY TEST: CPT

## 2025-02-25 PROCEDURE — G0296 VISIT TO DETERM LDCT ELIG: CPT

## 2025-02-25 PROCEDURE — 99205 OFFICE O/P NEW HI 60 MIN: CPT | Mod: 25

## 2025-02-25 PROCEDURE — 99407 BEHAV CHNG SMOKING > 10 MIN: CPT

## 2025-02-25 RX ORDER — CLOPIDOGREL BISULFATE 75 MG/1
75 TABLET, FILM COATED ORAL
Refills: 0 | Status: ACTIVE | COMMUNITY

## 2025-02-25 RX ORDER — VARENICLINE TARTRATE 1 MG/1
1 TABLET, FILM COATED ORAL
Qty: 1 | Refills: 5 | Status: ACTIVE | COMMUNITY
Start: 2025-02-25 | End: 1900-01-01

## 2025-02-25 RX ORDER — LISINOPRIL 20 MG/1
20 TABLET ORAL
Refills: 0 | Status: ACTIVE | COMMUNITY

## 2025-03-03 ENCOUNTER — NON-APPOINTMENT (OUTPATIENT)
Age: 65
End: 2025-03-03

## 2025-03-10 NOTE — PATIENT PROFILE ADULT - FUNCTIONAL ASSESSMENT - BASIC MOBILITY 1.
Patient is still on spironolactone 12.5mg daily. This was last ordered by Daniel Diehl.     LOV on 2/4 with Augustine Diehl. Next OV on 4/14.  Patient has a history of CHF, afib, coarctation of aorta, hx AVR mechanical, left subclavian artery surgically divided, MV stenosis, PHTN, shone complex, subaortic membrane.    Medication is pending as there is a high drug interaction.    Patients last BMP lab was on 2/18. Augustine Diehl. please advise on drug interaction. Next expected lab undetermined at this time.      3 = A little assistance

## 2025-03-19 ENCOUNTER — NON-APPOINTMENT (OUTPATIENT)
Age: 65
End: 2025-03-19

## 2025-06-25 ENCOUNTER — APPOINTMENT (OUTPATIENT)
Dept: PULMONOLOGY | Facility: CLINIC | Age: 65
End: 2025-06-25
Payer: MEDICARE

## 2025-06-25 VITALS
DIASTOLIC BLOOD PRESSURE: 76 MMHG | OXYGEN SATURATION: 96 % | RESPIRATION RATE: 16 BRPM | HEART RATE: 83 BPM | SYSTOLIC BLOOD PRESSURE: 130 MMHG

## 2025-06-25 PROCEDURE — 99205 OFFICE O/P NEW HI 60 MIN: CPT | Mod: 25

## 2025-06-25 PROCEDURE — 99406 BEHAV CHNG SMOKING 3-10 MIN: CPT

## 2025-06-25 PROCEDURE — G0296 VISIT TO DETERM LDCT ELIG: CPT

## 2025-09-16 ENCOUNTER — RX RENEWAL (OUTPATIENT)
Age: 65
End: 2025-09-16

## (undated) DEVICE — SPONGE PEANUT AUTO COUNT

## (undated) DEVICE — DRSG VAC GRANUFOAM SMALL (BLACK)

## (undated) DEVICE — ZIMMER PULSAVAC PLUS FAN KIT

## (undated) DEVICE — SOL IRR POUR NS 0.9% 500ML

## (undated) DEVICE — DRSG SENSA TRAC SMALL

## (undated) DEVICE — DRAPE 3/4 SHEET 52X76"

## (undated) DEVICE — SUT GORETEX CV-5 (4-0) 36" TTC-13

## (undated) DEVICE — PACK EXTREMITY

## (undated) DEVICE — SOL IRR POUR H2O 1000ML

## (undated) DEVICE — SYR CONTROL LUER LOK 10CC

## (undated) DEVICE — SOL IRR BAG NS 0.9% 3000ML

## (undated) DEVICE — VENODYNE/SCD SLEEVE CALF MEDIUM

## (undated) DEVICE — DRAPE SPLIT SHEET 77" X 108"

## (undated) DEVICE — TUNNELER SHEATH GREEN LARGE

## (undated) DEVICE — SUT SILK 2-0 30" TIES

## (undated) DEVICE — DRSG VAC GRANUFOAM LARGE (BLACK)

## (undated) DEVICE — SUT PROLENE 6-0 30" C-1

## (undated) DEVICE — INFLATOR ENCORE 26

## (undated) DEVICE — SUT SILK 3-0 30" TIES

## (undated) DEVICE — SUT SILK 2-0 30" SH

## (undated) DEVICE — DRAPE ISOLATION BAG 20X20"

## (undated) DEVICE — SUT VICRYL 3-0 27" SH

## (undated) DEVICE — CANISTER W/GEL INFOVAC 1000ML

## (undated) DEVICE — GLV 7 PROTEXIS ORTHO (BROWN)

## (undated) DEVICE — DRAPE C ARM UNIVERSAL

## (undated) DEVICE — DRSG 4 X 8

## (undated) DEVICE — TORQUE DEVICE FOR GUIDEWIRE 0.0100.038"

## (undated) DEVICE — DRAPE FEMORAL ANGIOGRAPHY W TROUGH

## (undated) DEVICE — FLOW SWITCH HP CONTROL DEVICE

## (undated) DEVICE — WARMING BLANKET UPPER ADULT

## (undated) DEVICE — Device

## (undated) DEVICE — PACK UNIVERSAL DRAPE

## (undated) DEVICE — SOL BAG NS 0.9% 1000ML

## (undated) DEVICE — WARMING BLANKET FULL ADULT

## (undated) DEVICE — CULTURESWAB WITH CHARCOAL

## (undated) DEVICE — SUT GORETEX CV-6 (5-0) 36" TTC-13

## (undated) DEVICE — SUT SILK 0 30" SH

## (undated) DEVICE — DRAPE IOBAN 33" X 23"

## (undated) DEVICE — SOL IRR POUR NS 0.9% 1000ML

## (undated) DEVICE — ELCTR GROUNDING PAD ADULT COVIDIEN

## (undated) DEVICE — DRSG VAC GRANUFOAM MEDIUM (BLACK)

## (undated) DEVICE — SUT PROLENE 6-0 30" BV-1

## (undated) DEVICE — COVER ULTRASOUND PROBE T-SHAPED INTRAOP SM

## (undated) DEVICE — VESSEL LOOP MINI-BLUE 0.075" X 16"

## (undated) DEVICE — DRAPE DOME BAG 22"

## (undated) DEVICE — SUT VICRYL 2-0 27" SH

## (undated) DEVICE — PACK VASCULAR

## (undated) DEVICE — GLV 7.5 PROTEXIS (WHITE)

## (undated) DEVICE — POSITIONER CARDIAC BUMP

## (undated) DEVICE — SUT SILK 4-0 30" TIES

## (undated) DEVICE — CULTURESWAB WITHOUT CHARCOAL